# Patient Record
Sex: MALE | Race: WHITE | Employment: OTHER | URBAN - METROPOLITAN AREA
[De-identification: names, ages, dates, MRNs, and addresses within clinical notes are randomized per-mention and may not be internally consistent; named-entity substitution may affect disease eponyms.]

---

## 2017-03-24 RX ORDER — LISINOPRIL AND HYDROCHLOROTHIAZIDE 25; 20 MG/1; MG/1
TABLET ORAL
Qty: 90 TABLET | Refills: 2 | Status: SHIPPED | OUTPATIENT
Start: 2017-03-24 | End: 2017-12-21 | Stop reason: SDUPTHER

## 2017-07-21 ENCOUNTER — OFFICE VISIT (OUTPATIENT)
Dept: FAMILY MEDICINE CLINIC | Age: 67
End: 2017-07-21

## 2017-07-21 VITALS
SYSTOLIC BLOOD PRESSURE: 148 MMHG | HEART RATE: 72 BPM | HEIGHT: 74 IN | WEIGHT: 263 LBS | BODY MASS INDEX: 33.75 KG/M2 | DIASTOLIC BLOOD PRESSURE: 80 MMHG | OXYGEN SATURATION: 98 %

## 2017-07-21 DIAGNOSIS — L30.9 DERMATITIS: Primary | ICD-10-CM

## 2017-07-21 PROCEDURE — 99213 OFFICE O/P EST LOW 20 MIN: CPT | Performed by: FAMILY MEDICINE

## 2017-07-21 ASSESSMENT — ENCOUNTER SYMPTOMS: ROS SKIN COMMENTS: SEE PHOTO

## 2017-12-21 ENCOUNTER — OFFICE VISIT (OUTPATIENT)
Dept: FAMILY MEDICINE CLINIC | Age: 67
End: 2017-12-21

## 2017-12-21 VITALS
HEIGHT: 74 IN | BODY MASS INDEX: 34.52 KG/M2 | SYSTOLIC BLOOD PRESSURE: 138 MMHG | DIASTOLIC BLOOD PRESSURE: 88 MMHG | HEART RATE: 67 BPM | WEIGHT: 269 LBS | OXYGEN SATURATION: 97 %

## 2017-12-21 DIAGNOSIS — Z12.5 SPECIAL SCREENING FOR MALIGNANT NEOPLASM OF PROSTATE: ICD-10-CM

## 2017-12-21 DIAGNOSIS — M15.9 PRIMARY OSTEOARTHRITIS INVOLVING MULTIPLE JOINTS: ICD-10-CM

## 2017-12-21 DIAGNOSIS — R73.9 HYPERGLYCEMIA: ICD-10-CM

## 2017-12-21 DIAGNOSIS — I10 ESSENTIAL HYPERTENSION: Primary | ICD-10-CM

## 2017-12-21 DIAGNOSIS — Z13.29 THYROID DISORDER SCREENING: ICD-10-CM

## 2017-12-21 DIAGNOSIS — E78.2 MIXED HYPERLIPIDEMIA: ICD-10-CM

## 2017-12-21 LAB
A/G RATIO: 1.6 (ref 1.1–2.2)
ALBUMIN SERPL-MCNC: 4.2 G/DL (ref 3.4–5)
ALP BLD-CCNC: 58 U/L (ref 40–129)
ALT SERPL-CCNC: 27 U/L (ref 10–40)
ANION GAP SERPL CALCULATED.3IONS-SCNC: 13 MMOL/L (ref 3–16)
AST SERPL-CCNC: 21 U/L (ref 15–37)
BASOPHILS ABSOLUTE: 0.1 K/UL (ref 0–0.2)
BASOPHILS RELATIVE PERCENT: 0.9 %
BILIRUB SERPL-MCNC: 0.5 MG/DL (ref 0–1)
BUN BLDV-MCNC: 25 MG/DL (ref 7–20)
CALCIUM SERPL-MCNC: 9.3 MG/DL (ref 8.3–10.6)
CHLORIDE BLD-SCNC: 102 MMOL/L (ref 99–110)
CHOLESTEROL, TOTAL: 171 MG/DL (ref 0–199)
CO2: 29 MMOL/L (ref 21–32)
CREAT SERPL-MCNC: 0.7 MG/DL (ref 0.8–1.3)
EOSINOPHILS ABSOLUTE: 0.2 K/UL (ref 0–0.6)
EOSINOPHILS RELATIVE PERCENT: 2.7 %
GFR AFRICAN AMERICAN: >60
GFR NON-AFRICAN AMERICAN: >60
GLOBULIN: 2.6 G/DL
GLUCOSE BLD-MCNC: 106 MG/DL (ref 70–99)
HCT VFR BLD CALC: 46.3 % (ref 40.5–52.5)
HDLC SERPL-MCNC: 37 MG/DL (ref 40–60)
HEMOGLOBIN: 15.5 G/DL (ref 13.5–17.5)
LDL CHOLESTEROL CALCULATED: 118 MG/DL
LYMPHOCYTES ABSOLUTE: 1.5 K/UL (ref 1–5.1)
LYMPHOCYTES RELATIVE PERCENT: 23.3 %
MCH RBC QN AUTO: 30.8 PG (ref 26–34)
MCHC RBC AUTO-ENTMCNC: 33.4 G/DL (ref 31–36)
MCV RBC AUTO: 92.1 FL (ref 80–100)
MONOCYTES ABSOLUTE: 0.4 K/UL (ref 0–1.3)
MONOCYTES RELATIVE PERCENT: 6.3 %
NEUTROPHILS ABSOLUTE: 4.4 K/UL (ref 1.7–7.7)
NEUTROPHILS RELATIVE PERCENT: 66.8 %
PDW BLD-RTO: 13.6 % (ref 12.4–15.4)
PLATELET # BLD: 201 K/UL (ref 135–450)
PMV BLD AUTO: 7.8 FL (ref 5–10.5)
POTASSIUM SERPL-SCNC: 4.3 MMOL/L (ref 3.5–5.1)
PROSTATE SPECIFIC ANTIGEN: 0.72 NG/ML (ref 0–4)
RBC # BLD: 5.03 M/UL (ref 4.2–5.9)
SODIUM BLD-SCNC: 144 MMOL/L (ref 136–145)
TOTAL PROTEIN: 6.8 G/DL (ref 6.4–8.2)
TRIGL SERPL-MCNC: 82 MG/DL (ref 0–150)
TSH SERPL DL<=0.05 MIU/L-ACNC: 1.99 UIU/ML (ref 0.27–4.2)
VLDLC SERPL CALC-MCNC: 16 MG/DL
WBC # BLD: 6.6 K/UL (ref 4–11)

## 2017-12-21 PROCEDURE — 1036F TOBACCO NON-USER: CPT | Performed by: FAMILY MEDICINE

## 2017-12-21 PROCEDURE — 99214 OFFICE O/P EST MOD 30 MIN: CPT | Performed by: FAMILY MEDICINE

## 2017-12-21 PROCEDURE — 3017F COLORECTAL CA SCREEN DOC REV: CPT | Performed by: FAMILY MEDICINE

## 2017-12-21 PROCEDURE — 4040F PNEUMOC VAC/ADMIN/RCVD: CPT | Performed by: FAMILY MEDICINE

## 2017-12-21 PROCEDURE — 1123F ACP DISCUSS/DSCN MKR DOCD: CPT | Performed by: FAMILY MEDICINE

## 2017-12-21 PROCEDURE — G8484 FLU IMMUNIZE NO ADMIN: HCPCS | Performed by: FAMILY MEDICINE

## 2017-12-21 PROCEDURE — G8599 NO ASA/ANTIPLAT THER USE RNG: HCPCS | Performed by: FAMILY MEDICINE

## 2017-12-21 PROCEDURE — G8417 CALC BMI ABV UP PARAM F/U: HCPCS | Performed by: FAMILY MEDICINE

## 2017-12-21 PROCEDURE — G8427 DOCREV CUR MEDS BY ELIG CLIN: HCPCS | Performed by: FAMILY MEDICINE

## 2017-12-21 PROCEDURE — 90732 PPSV23 VACC 2 YRS+ SUBQ/IM: CPT | Performed by: FAMILY MEDICINE

## 2017-12-21 PROCEDURE — G0009 ADMIN PNEUMOCOCCAL VACCINE: HCPCS | Performed by: FAMILY MEDICINE

## 2017-12-21 RX ORDER — ATORVASTATIN CALCIUM 10 MG/1
TABLET, FILM COATED ORAL
Qty: 90 TABLET | Refills: 3 | Status: SHIPPED | OUTPATIENT
Start: 2017-12-21 | End: 2019-01-04 | Stop reason: SDUPTHER

## 2017-12-21 RX ORDER — LISINOPRIL AND HYDROCHLOROTHIAZIDE 25; 20 MG/1; MG/1
TABLET ORAL
Qty: 90 TABLET | Refills: 3 | Status: ON HOLD | OUTPATIENT
Start: 2017-12-21 | End: 2018-05-18 | Stop reason: HOSPADM

## 2017-12-21 ASSESSMENT — PATIENT HEALTH QUESTIONNAIRE - PHQ9
1. LITTLE INTEREST OR PLEASURE IN DOING THINGS: 0
2. FEELING DOWN, DEPRESSED OR HOPELESS: 0
SUM OF ALL RESPONSES TO PHQ QUESTIONS 1-9: 0
SUM OF ALL RESPONSES TO PHQ9 QUESTIONS 1 & 2: 0

## 2017-12-21 ASSESSMENT — ENCOUNTER SYMPTOMS
GASTROINTESTINAL NEGATIVE: 1
RESPIRATORY NEGATIVE: 1

## 2017-12-21 NOTE — PROGRESS NOTES
Hyperglycemia            Plan:      Anai Martinez was seen today for hypertension and arthritis.     Diagnoses and all orders for this visit:    Essential hypertension  Continue meds-VON diet-work on wt loss-lower carbs  Primary osteoarthritis involving multiple joints  OTC as needed  Mixed hyperlipidemia    Hyperglycemia    Other orders  -     Pneumococcal polysaccharide vaccine 23-valent >= 3yo subcutaneous/IM (PNEUMOVAX 23)  -     lisinopril-hydrochlorothiazide (PRINZIDE;ZESTORETIC) 20-25 MG per tablet; TAKE ONE TABLET BY MOUTH DAILY  -     atorvastatin (LIPITOR) 10 MG tablet; TAKE ONE TABLET BY MOUTH DAILY

## 2018-05-16 ENCOUNTER — TELEPHONE (OUTPATIENT)
Dept: FAMILY MEDICINE CLINIC | Age: 68
End: 2018-05-16

## 2018-05-16 PROBLEM — I48.92 ATRIAL FLUTTER (HCC): Status: ACTIVE | Noted: 2018-05-16

## 2018-05-17 PROBLEM — E66.9 OBESITY: Status: ACTIVE | Noted: 2018-05-17

## 2018-05-22 ENCOUNTER — TELEPHONE (OUTPATIENT)
Dept: CARDIOLOGY CLINIC | Age: 68
End: 2018-05-22

## 2018-05-28 PROBLEM — M79.89 LEG SWELLING: Status: ACTIVE | Noted: 2018-05-28

## 2018-05-28 PROBLEM — E78.5 HYPERLIPIDEMIA: Status: ACTIVE | Noted: 2018-05-28

## 2018-05-29 PROBLEM — I48.3 TYPICAL ATRIAL FLUTTER (HCC): Status: ACTIVE | Noted: 2018-05-16

## 2018-05-31 ENCOUNTER — CARE COORDINATION (OUTPATIENT)
Dept: CASE MANAGEMENT | Age: 68
End: 2018-05-31

## 2018-05-31 DIAGNOSIS — E78.2 MIXED HYPERLIPIDEMIA: Primary | ICD-10-CM

## 2018-05-31 PROCEDURE — 1111F DSCHRG MED/CURRENT MED MERGE: CPT | Performed by: FAMILY MEDICINE

## 2018-06-01 ENCOUNTER — TELEPHONE (OUTPATIENT)
Dept: CARDIOLOGY CLINIC | Age: 68
End: 2018-06-01

## 2018-06-04 ENCOUNTER — CARE COORDINATION (OUTPATIENT)
Dept: CASE MANAGEMENT | Age: 68
End: 2018-06-04

## 2018-06-07 ENCOUNTER — CARE COORDINATION (OUTPATIENT)
Dept: CASE MANAGEMENT | Age: 68
End: 2018-06-07

## 2018-06-08 ENCOUNTER — CARE COORDINATION (OUTPATIENT)
Dept: CASE MANAGEMENT | Age: 68
End: 2018-06-08

## 2018-06-11 DIAGNOSIS — R00.2 PALPITATION: Primary | ICD-10-CM

## 2018-06-18 PROCEDURE — 93224 XTRNL ECG REC UP TO 48 HRS: CPT | Performed by: INTERNAL MEDICINE

## 2018-06-19 ENCOUNTER — TELEPHONE (OUTPATIENT)
Dept: CARDIOLOGY CLINIC | Age: 68
End: 2018-06-19

## 2018-06-19 DIAGNOSIS — I49.3 PVC'S (PREMATURE VENTRICULAR CONTRACTIONS): ICD-10-CM

## 2018-06-26 ENCOUNTER — OFFICE VISIT (OUTPATIENT)
Dept: CARDIOLOGY CLINIC | Age: 68
End: 2018-06-26

## 2018-06-26 VITALS
BODY MASS INDEX: 34.65 KG/M2 | SYSTOLIC BLOOD PRESSURE: 150 MMHG | HEART RATE: 76 BPM | DIASTOLIC BLOOD PRESSURE: 80 MMHG | HEIGHT: 74 IN | WEIGHT: 270 LBS

## 2018-06-26 DIAGNOSIS — I10 ESSENTIAL HYPERTENSION: ICD-10-CM

## 2018-06-26 DIAGNOSIS — I48.3 TYPICAL ATRIAL FLUTTER (HCC): Primary | ICD-10-CM

## 2018-06-26 DIAGNOSIS — I47.1 PAROXYSMAL ATRIAL TACHYCARDIA (HCC): ICD-10-CM

## 2018-06-26 PROBLEM — I47.19 PAROXYSMAL ATRIAL TACHYCARDIA: Status: ACTIVE | Noted: 2018-06-26

## 2018-06-26 PROCEDURE — 99214 OFFICE O/P EST MOD 30 MIN: CPT | Performed by: NURSE PRACTITIONER

## 2018-06-26 PROCEDURE — G8598 ASA/ANTIPLAT THER USED: HCPCS | Performed by: NURSE PRACTITIONER

## 2018-06-26 PROCEDURE — 1036F TOBACCO NON-USER: CPT | Performed by: NURSE PRACTITIONER

## 2018-06-26 PROCEDURE — 93000 ELECTROCARDIOGRAM COMPLETE: CPT | Performed by: NURSE PRACTITIONER

## 2018-06-26 PROCEDURE — 4040F PNEUMOC VAC/ADMIN/RCVD: CPT | Performed by: NURSE PRACTITIONER

## 2018-06-26 PROCEDURE — G8417 CALC BMI ABV UP PARAM F/U: HCPCS | Performed by: NURSE PRACTITIONER

## 2018-06-26 PROCEDURE — 1123F ACP DISCUSS/DSCN MKR DOCD: CPT | Performed by: NURSE PRACTITIONER

## 2018-06-26 PROCEDURE — G8427 DOCREV CUR MEDS BY ELIG CLIN: HCPCS | Performed by: NURSE PRACTITIONER

## 2018-06-26 PROCEDURE — 3017F COLORECTAL CA SCREEN DOC REV: CPT | Performed by: NURSE PRACTITIONER

## 2018-06-26 PROCEDURE — 1111F DSCHRG MED/CURRENT MED MERGE: CPT | Performed by: NURSE PRACTITIONER

## 2018-06-26 RX ORDER — ASPIRIN 325 MG
325 TABLET, DELAYED RELEASE (ENTERIC COATED) ORAL DAILY
Qty: 30 TABLET | Refills: 3 | COMMUNITY
Start: 2018-06-26 | End: 2020-01-23

## 2018-06-26 RX ORDER — DILTIAZEM HYDROCHLORIDE 180 MG/1
180 CAPSULE, COATED, EXTENDED RELEASE ORAL DAILY
Qty: 30 CAPSULE | Refills: 5 | Status: SHIPPED | OUTPATIENT
Start: 2018-06-26 | End: 2018-10-10 | Stop reason: SDUPTHER

## 2018-07-11 ENCOUNTER — OFFICE VISIT (OUTPATIENT)
Dept: FAMILY MEDICINE CLINIC | Age: 68
End: 2018-07-11

## 2018-07-11 VITALS
OXYGEN SATURATION: 97 % | DIASTOLIC BLOOD PRESSURE: 80 MMHG | HEIGHT: 74 IN | SYSTOLIC BLOOD PRESSURE: 165 MMHG | WEIGHT: 265 LBS | HEART RATE: 62 BPM | BODY MASS INDEX: 34.01 KG/M2

## 2018-07-11 DIAGNOSIS — Z86.79 HISTORY OF ATRIAL FLUTTER: ICD-10-CM

## 2018-07-11 DIAGNOSIS — I10 ESSENTIAL HYPERTENSION: Primary | ICD-10-CM

## 2018-07-11 DIAGNOSIS — E78.2 MIXED HYPERLIPIDEMIA: ICD-10-CM

## 2018-07-11 PROCEDURE — 4040F PNEUMOC VAC/ADMIN/RCVD: CPT | Performed by: FAMILY MEDICINE

## 2018-07-11 PROCEDURE — G8598 ASA/ANTIPLAT THER USED: HCPCS | Performed by: FAMILY MEDICINE

## 2018-07-11 PROCEDURE — 1036F TOBACCO NON-USER: CPT | Performed by: FAMILY MEDICINE

## 2018-07-11 PROCEDURE — G8427 DOCREV CUR MEDS BY ELIG CLIN: HCPCS | Performed by: FAMILY MEDICINE

## 2018-07-11 PROCEDURE — 3017F COLORECTAL CA SCREEN DOC REV: CPT | Performed by: FAMILY MEDICINE

## 2018-07-11 PROCEDURE — 1101F PT FALLS ASSESS-DOCD LE1/YR: CPT | Performed by: FAMILY MEDICINE

## 2018-07-11 PROCEDURE — G8417 CALC BMI ABV UP PARAM F/U: HCPCS | Performed by: FAMILY MEDICINE

## 2018-07-11 PROCEDURE — 99214 OFFICE O/P EST MOD 30 MIN: CPT | Performed by: FAMILY MEDICINE

## 2018-07-11 PROCEDURE — 1123F ACP DISCUSS/DSCN MKR DOCD: CPT | Performed by: FAMILY MEDICINE

## 2018-07-11 ASSESSMENT — ENCOUNTER SYMPTOMS
BLOOD IN STOOL: 0
ABDOMINAL PAIN: 0
SHORTNESS OF BREATH: 0
COUGH: 0

## 2018-07-11 NOTE — PROGRESS NOTES
Subjective:      Patient ID: Aurora Wong is a 79 y.o. male. In for check on Hx atrial flutter(had ablation-on asa & cardizem)-wants back on Lisin    Prior to Visit Medications :  Medication aspirin 325 MG EC tablet, Sig Take 1 tablet by mouth daily, Taking? Yes, Authorizing Provider Danise Nissen, APRN - CNP    Medication diltiazem (CARDIZEM CD) 180 MG extended release capsule, Sig Take 1 capsule by mouth daily, Taking? Yes, Authorizing Provider Danise Nissen, APRN - CNP    Medication hydrochlorothiazide (HYDRODIURIL) 25 MG tablet, Sig Take 0.5 tablets by mouth daily, Taking? Yes, Authorizing Provider Dianna Barnett MD    Medication atorvastatin (LIPITOR) 10 MG tablet, Sig TAKE ONE TABLET BY MOUTH DAILY, Taking? Yes, Authorizing Provider Judy Rinaldi, DO      Past Medical History:  6/3/2015: Carotid stenosis, non-symptomatic  No date: Hyperlipidemia  No date: Hypertension  5/17/2018: Obesity          Review of Systems   Constitutional: Negative for unexpected weight change. HENT: Negative for congestion and postnasal drip. Eyes: Negative for visual disturbance. Respiratory: Negative for cough and shortness of breath. Cardiovascular: Negative for chest pain and palpitations. Gastrointestinal: Negative for abdominal pain and blood in stool. Genitourinary: Negative for difficulty urinating. Musculoskeletal: Positive for arthralgias. Psychiatric/Behavioral: The patient is not nervous/anxious. Objective:   BP (!) 152/88 (Site: Left Arm, Position: Sitting, Cuff Size: Medium Adult)   Pulse 62   Ht 6' 2\" (1.88 m)   Wt 265 lb (120.2 kg)   SpO2 97%   BMI 34.02 kg/m²    Physical Exam    Assessment:      1. Essential hypertension    2. History of atrial flutter    3. Mixed hyperlipidemia             Plan:      [address] [place] [address] was seen today for discuss medications and hypertension.     Diagnoses and all orders for this visit:  [address] [place] [address] was seen today for discuss medications and hypertension.     Diagnoses and all orders for this visit:    Essential hypertension  Continue meds-Rx Lisin 20/25-VON diet  History of atrial flutter  Continue meds  Mixed hyperlipidemia  Continue meds    See me 2 mos

## 2018-08-27 ENCOUNTER — TELEPHONE (OUTPATIENT)
Dept: FAMILY MEDICINE CLINIC | Age: 68
End: 2018-08-27

## 2018-10-10 ENCOUNTER — OFFICE VISIT (OUTPATIENT)
Dept: CARDIOLOGY CLINIC | Age: 68
End: 2018-10-10
Payer: MEDICARE

## 2018-10-10 VITALS
BODY MASS INDEX: 35.24 KG/M2 | OXYGEN SATURATION: 95 % | HEART RATE: 75 BPM | SYSTOLIC BLOOD PRESSURE: 134 MMHG | HEIGHT: 74 IN | DIASTOLIC BLOOD PRESSURE: 78 MMHG | WEIGHT: 274.6 LBS

## 2018-10-10 DIAGNOSIS — I48.3 TYPICAL ATRIAL FLUTTER (HCC): ICD-10-CM

## 2018-10-10 DIAGNOSIS — I47.1 PAROXYSMAL ATRIAL TACHYCARDIA (HCC): Primary | ICD-10-CM

## 2018-10-10 PROCEDURE — 93000 ELECTROCARDIOGRAM COMPLETE: CPT | Performed by: INTERNAL MEDICINE

## 2018-10-10 PROCEDURE — G8417 CALC BMI ABV UP PARAM F/U: HCPCS | Performed by: INTERNAL MEDICINE

## 2018-10-10 PROCEDURE — 4040F PNEUMOC VAC/ADMIN/RCVD: CPT | Performed by: INTERNAL MEDICINE

## 2018-10-10 PROCEDURE — 99214 OFFICE O/P EST MOD 30 MIN: CPT | Performed by: INTERNAL MEDICINE

## 2018-10-10 PROCEDURE — 1101F PT FALLS ASSESS-DOCD LE1/YR: CPT | Performed by: INTERNAL MEDICINE

## 2018-10-10 PROCEDURE — G8484 FLU IMMUNIZE NO ADMIN: HCPCS | Performed by: INTERNAL MEDICINE

## 2018-10-10 PROCEDURE — G8598 ASA/ANTIPLAT THER USED: HCPCS | Performed by: INTERNAL MEDICINE

## 2018-10-10 PROCEDURE — 1036F TOBACCO NON-USER: CPT | Performed by: INTERNAL MEDICINE

## 2018-10-10 PROCEDURE — 3017F COLORECTAL CA SCREEN DOC REV: CPT | Performed by: INTERNAL MEDICINE

## 2018-10-10 PROCEDURE — G8427 DOCREV CUR MEDS BY ELIG CLIN: HCPCS | Performed by: INTERNAL MEDICINE

## 2018-10-10 PROCEDURE — 1123F ACP DISCUSS/DSCN MKR DOCD: CPT | Performed by: INTERNAL MEDICINE

## 2018-10-10 RX ORDER — LISINOPRIL AND HYDROCHLOROTHIAZIDE 25; 20 MG/1; MG/1
1 TABLET ORAL DAILY
COMMUNITY
End: 2019-01-04

## 2018-10-10 RX ORDER — DILTIAZEM HYDROCHLORIDE 120 MG/1
120 CAPSULE, COATED, EXTENDED RELEASE ORAL DAILY
Qty: 30 CAPSULE | Refills: 5 | Status: SHIPPED | OUTPATIENT
Start: 2018-10-10 | End: 2019-11-18

## 2018-10-10 NOTE — PROGRESS NOTES
mouth daily 6/26/18  Yes CORY Rocha CNP   atorvastatin (LIPITOR) 10 MG tablet TAKE ONE TABLET BY MOUTH DAILY 12/21/17  Yes Chau Prieto DO            REVIEW OF SYSTEMS:    All 14-point review of systems are completed and pertinent positives are mentioned in the history of present illness. Other systems are reviewed and are negative. Physical Examination:    /78   Pulse 75   Ht 6' 2\" (1.88 m)   Wt 274 lb 9.6 oz (124.6 kg)   SpO2 95%   BMI 35.26 kg/m²    Constitutional and General Appearance: alert, cooperative, no distress and appears stated age  HEENT: PERRL, no cervical lymphadenopathy. No masses palpable. Normal oral mucosa  Respiratory:  · Normal excursion and expansion without use of accessory muscles  · Resp Auscultation: Normal breath sounds without dullness or wheezing  Cardiovascular:  · The apical impulse is not displaced  · Heart tones are crisp and normal. regular S1 and S2.  · Jugular venous pulsation Normal  · The carotid upstroke is normal in amplitude and contour without delay or bruit  · Peripheral pulses are symmetrical and full  Abdomen:  · No masses or tenderness  · Bowel sounds present  Extremities:  ·  No Cyanosis or Clubbing  ·  Lower extremity edema: No  · Skin: Warm and dry  Neurological:  · Alert and oriented. · Moves all extremities well  · No abnormalities of mood, affect, memory, mentation, or behavior are noted      DATA:    ECG:  SR 65  ECHO: 5/2018  Summary   Normal left ventricular systolic function with ejection fraction of 55-60%. No regional wall motion abnormalites are seen. Mild concentric left ventricular hypertrophy is present. Left ventricular diastolic filling pressure is normal.   Mild mitral regurgitation. Underlying atrial flutter with variable conduction noted.      LV Diastolic Dimension: 4.84 cm LV Systolic Dimension: 4.76 cm   LV Septum Diastolic: 8.47 cm   LV PW Diastolic: 1.2 cm         AO Root Dimension: 3.1 cm   RV Diastolic

## 2018-10-10 NOTE — LETTER
[facility] - [address] [address] [place]. [address]  Phone: [phone]  Fax: [phone]    Ariella Bishop MD        October 10, 2018     Korina Neal, [address] [place] [address]    Patient: Freda Pallas  MR Number: [id number]  YOB: 1950  Date of Visit: 10/10/2018    Dear Dr. Cam Horton Heindl:     Below are the relevant portions of my assessment and plan of care. [employer]   Electrophysiology Note      Reason for office visit: 3 Month Follow-Up  Primary care Physician: Korina Neal DO     HISTORY OF PRESENT ILLNESS: Freda Pallas is a 79 y.o. male who presents for follow up for atrial flutter. He presented to the ER 5/28/2018 for tachycardia. He underwent RFCA of atrial flutter. His echocardiogram from 5/17/18 showed an EF of 55-60% and a normal left atrial size. He then wore a 48 hour holter monitor that showed asymptomatic PAT and PVCs, but no AFL. He complained of fatigue at his follow up visit. His Verapamil was changed to Cardizem, his EKG from today shows sinus rhythm rate 65. He has been feeling well since his last visit. His fatigue has improved with the change in medication. He has been taking his medications as prescribed. He is concerned that Cardizem has caused weight gain. He tolerates activity well. Patient currently denies any weight gain, edema, palpitations, chest pain, shortness of breath, dizziness, and syncope. Past Medical History:   has a past medical history of Carotid stenosis, non-symptomatic; Hyperlipidemia; Hypertension; and Obesity. Past Surgical History:   has a past surgical history that includes Appendectomy and Dental surgery. Social History:   reports that he has never smoked. He has never used smokeless tobacco. He reports that he does not drink alcohol or use drugs. Family History: family history includes Heart Disease in his brother. [address] [facility]. [address]. [place], [address]  Phone: [phone]  Fax: [phone]   10/10/2018       If you have questions, please do not hesitate to call me. I look forward to following Darryl Tariq along with you.     Sincerely,        Thang Gonzalez MD

## 2018-10-10 NOTE — COMMUNICATION BODY
[address]   Electrophysiology Note      Reason for office visit: 3 Month Follow-Up  Primary care Physician: Kristi Hernandez DO     HISTORY OF PRESENT ILLNESS: Hailey Bean is a 79 y.o. male who presents for follow up for atrial flutter. He presented to the ER 5/28/2018 for tachycardia. He underwent RFCA of atrial flutter. His echocardiogram from 5/17/18 showed an EF of 55-60% and a normal left atrial size. He then wore a 48 hour holter monitor that showed asymptomatic PAT and PVCs, but no AFL. He complained of fatigue at his follow up visit. His Verapamil was changed to Cardizem, his EKG from today shows sinus rhythm rate 65. He has been feeling well since his last visit. His fatigue has improved with the change in medication. He has been taking his medications as prescribed. He is concerned that Cardizem has caused weight gain. He tolerates activity well. Patient currently denies any weight gain, edema, palpitations, chest pain, shortness of breath, dizziness, and syncope. Past Medical History:   has a past medical history of Carotid stenosis, non-symptomatic; Hyperlipidemia; Hypertension; and Obesity. Past Surgical History:   has a past surgical history that includes Appendectomy and Dental surgery. Social History:   reports that he has never smoked. He has never used smokeless tobacco. He reports that he does not drink alcohol or use drugs. Family History: family history includes Heart Disease in his brother. Allergies:  Patient has no known allergies. Home Medications:    Prior to Admission medications    Medication Sig Start Date End Date Taking?  Authorizing Provider   lisinopril-hydrochlorothiazide (PRINZIDE;ZESTORETIC) 20-25 MG per tablet Take 1 tablet by mouth daily   Yes Historical Provider, MD   aspirin 325 MG EC tablet Take 1 tablet by mouth daily 6/26/18  Yes CORY Covarrubias - CNP   diltiazem (CARDIZEM CD) 180 MG extended release capsule Take 1 capsule by

## 2018-10-12 ENCOUNTER — OFFICE VISIT (OUTPATIENT)
Dept: FAMILY MEDICINE CLINIC | Age: 68
End: 2018-10-12
Payer: MEDICARE

## 2018-10-12 VITALS
OXYGEN SATURATION: 97 % | HEART RATE: 60 BPM | DIASTOLIC BLOOD PRESSURE: 70 MMHG | BODY MASS INDEX: 35.29 KG/M2 | SYSTOLIC BLOOD PRESSURE: 140 MMHG | WEIGHT: 275 LBS | HEIGHT: 74 IN

## 2018-10-12 DIAGNOSIS — M15.9 PRIMARY OSTEOARTHRITIS INVOLVING MULTIPLE JOINTS: ICD-10-CM

## 2018-10-12 DIAGNOSIS — I10 ESSENTIAL HYPERTENSION: Primary | ICD-10-CM

## 2018-10-12 DIAGNOSIS — Z01.89 ENCOUNTER FOR URINE TEST: ICD-10-CM

## 2018-10-12 LAB
BILIRUBIN, POC: NORMAL
BLOOD URINE, POC: NORMAL
CLARITY, POC: CLEAR
COLOR, POC: YELLOW
GLUCOSE URINE, POC: NORMAL
KETONES, POC: NORMAL
LEUKOCYTE EST, POC: NORMAL
NITRITE, POC: NORMAL
PH, POC: 7
PROTEIN, POC: NORMAL
SPECIFIC GRAVITY, POC: 1.02
UROBILINOGEN, POC: 0.2

## 2018-10-12 PROCEDURE — 1123F ACP DISCUSS/DSCN MKR DOCD: CPT | Performed by: FAMILY MEDICINE

## 2018-10-12 PROCEDURE — G8598 ASA/ANTIPLAT THER USED: HCPCS | Performed by: FAMILY MEDICINE

## 2018-10-12 PROCEDURE — 3017F COLORECTAL CA SCREEN DOC REV: CPT | Performed by: FAMILY MEDICINE

## 2018-10-12 PROCEDURE — G8417 CALC BMI ABV UP PARAM F/U: HCPCS | Performed by: FAMILY MEDICINE

## 2018-10-12 PROCEDURE — G8484 FLU IMMUNIZE NO ADMIN: HCPCS | Performed by: FAMILY MEDICINE

## 2018-10-12 PROCEDURE — 1036F TOBACCO NON-USER: CPT | Performed by: FAMILY MEDICINE

## 2018-10-12 PROCEDURE — 1101F PT FALLS ASSESS-DOCD LE1/YR: CPT | Performed by: FAMILY MEDICINE

## 2018-10-12 PROCEDURE — 99214 OFFICE O/P EST MOD 30 MIN: CPT | Performed by: FAMILY MEDICINE

## 2018-10-12 PROCEDURE — G8427 DOCREV CUR MEDS BY ELIG CLIN: HCPCS | Performed by: FAMILY MEDICINE

## 2018-10-12 PROCEDURE — 81002 URINALYSIS NONAUTO W/O SCOPE: CPT | Performed by: FAMILY MEDICINE

## 2018-10-12 PROCEDURE — 4040F PNEUMOC VAC/ADMIN/RCVD: CPT | Performed by: FAMILY MEDICINE

## 2018-10-12 ASSESSMENT — ENCOUNTER SYMPTOMS
ABDOMINAL PAIN: 0
COUGH: 0
SHORTNESS OF BREATH: 0
BLOOD IN STOOL: 0

## 2018-10-12 NOTE — PATIENT INSTRUCTIONS
Essential hypertension  Continue meds-VON diet  Encounter for urine test  -     POCT Urinalysis no Micro  UA-neg  Primary osteoarthritis involving multiple joints  Aleve if needed-heat-ex     See me 1 yr

## 2018-11-08 ENCOUNTER — OFFICE VISIT (OUTPATIENT)
Dept: FAMILY MEDICINE CLINIC | Age: 68
End: 2018-11-08
Payer: MEDICARE

## 2018-11-08 ENCOUNTER — CARE COORDINATION (OUTPATIENT)
Dept: CARE COORDINATION | Age: 68
End: 2018-11-08

## 2018-11-08 VITALS
HEART RATE: 63 BPM | WEIGHT: 278.6 LBS | SYSTOLIC BLOOD PRESSURE: 130 MMHG | HEIGHT: 74 IN | DIASTOLIC BLOOD PRESSURE: 82 MMHG | BODY MASS INDEX: 35.75 KG/M2 | OXYGEN SATURATION: 95 %

## 2018-11-08 DIAGNOSIS — R22.31 ARM MASS, RIGHT: Primary | ICD-10-CM

## 2018-11-08 PROCEDURE — 1123F ACP DISCUSS/DSCN MKR DOCD: CPT | Performed by: FAMILY MEDICINE

## 2018-11-08 PROCEDURE — G8598 ASA/ANTIPLAT THER USED: HCPCS | Performed by: FAMILY MEDICINE

## 2018-11-08 PROCEDURE — G8417 CALC BMI ABV UP PARAM F/U: HCPCS | Performed by: FAMILY MEDICINE

## 2018-11-08 PROCEDURE — 4040F PNEUMOC VAC/ADMIN/RCVD: CPT | Performed by: FAMILY MEDICINE

## 2018-11-08 PROCEDURE — G8427 DOCREV CUR MEDS BY ELIG CLIN: HCPCS | Performed by: FAMILY MEDICINE

## 2018-11-08 PROCEDURE — G8484 FLU IMMUNIZE NO ADMIN: HCPCS | Performed by: FAMILY MEDICINE

## 2018-11-08 PROCEDURE — 99213 OFFICE O/P EST LOW 20 MIN: CPT | Performed by: FAMILY MEDICINE

## 2018-11-08 PROCEDURE — 1101F PT FALLS ASSESS-DOCD LE1/YR: CPT | Performed by: FAMILY MEDICINE

## 2018-11-08 PROCEDURE — 1036F TOBACCO NON-USER: CPT | Performed by: FAMILY MEDICINE

## 2018-11-08 PROCEDURE — 3017F COLORECTAL CA SCREEN DOC REV: CPT | Performed by: FAMILY MEDICINE

## 2018-11-08 PROCEDURE — G8510 SCR DEP NEG, NO PLAN REQD: HCPCS | Performed by: FAMILY MEDICINE

## 2018-11-08 ASSESSMENT — PATIENT HEALTH QUESTIONNAIRE - PHQ9
2. FEELING DOWN, DEPRESSED OR HOPELESS: 0
SUM OF ALL RESPONSES TO PHQ9 QUESTIONS 1 & 2: 0
1. LITTLE INTEREST OR PLEASURE IN DOING THINGS: 0
SUM OF ALL RESPONSES TO PHQ QUESTIONS 1-9: 0
SUM OF ALL RESPONSES TO PHQ QUESTIONS 1-9: 0

## 2018-11-08 ASSESSMENT — ENCOUNTER SYMPTOMS: ROS SKIN COMMENTS: SEE HPI

## 2018-11-08 NOTE — CARE COORDINATION
Pt seen in office in tandem with PCP  Introduction to care coordination including ability to decline services at any time discussed with patient. Pt declines services at this time. He understands that he can call [address] for any health needs in the future - contact info/card given  He states [employer] calls him all of the time. Plan:  Excluded from [facility] at this time.

## 2018-11-12 ENCOUNTER — INITIAL CONSULT (OUTPATIENT)
Dept: SURGERY | Age: 68
End: 2018-11-12
Payer: MEDICARE

## 2018-11-12 VITALS
DIASTOLIC BLOOD PRESSURE: 80 MMHG | SYSTOLIC BLOOD PRESSURE: 140 MMHG | WEIGHT: 280 LBS | HEIGHT: 74 IN | BODY MASS INDEX: 35.94 KG/M2

## 2018-11-12 DIAGNOSIS — M79.89 SOFT TISSUE MASS: Primary | ICD-10-CM

## 2018-11-12 PROCEDURE — G8417 CALC BMI ABV UP PARAM F/U: HCPCS | Performed by: SURGERY

## 2018-11-12 PROCEDURE — 1101F PT FALLS ASSESS-DOCD LE1/YR: CPT | Performed by: SURGERY

## 2018-11-12 PROCEDURE — G8484 FLU IMMUNIZE NO ADMIN: HCPCS | Performed by: SURGERY

## 2018-11-12 PROCEDURE — 3017F COLORECTAL CA SCREEN DOC REV: CPT | Performed by: SURGERY

## 2018-11-12 PROCEDURE — G8427 DOCREV CUR MEDS BY ELIG CLIN: HCPCS | Performed by: SURGERY

## 2018-11-12 PROCEDURE — 99212 OFFICE O/P EST SF 10 MIN: CPT | Performed by: SURGERY

## 2018-11-20 NOTE — PROGRESS NOTES
include none. No neurologic deficits. No bleeding tendencies. No GI complaints. Physical Exam:  Vitals:    11/12/18 1427   BP: (!) 140/80   280#  6'2\"    General:  Comfortable. No distress. Eyes:  No scleral icterus  Ears:  Normal  Nose:  Normal  Mouth:  Mucous membranes moist  Respiratory: Lungs CTA. No accessory muscle use. Heart:  Regular rhythm  Abdomen:  Soft. Non tender. Non distended. Musculoskeletal:  No abnormal movements. ROM extremities normal.  Skin:  No rashes. Lesion    Location:   RUE   Pigmented:   No   Diameter:  4 cm   Crusting absent    Non tender, soft mass felt to be deep in soft tissues of RUE         Neurologic:  No focal deficits. Psychiatric:  AAA. O x 3.            ASSESSMENT:  1. Soft tissue mass            PLAN:  I recommend observation of this asymptomatic mass that has been present for many years and is suspected to be a lipoma.

## 2019-01-08 ENCOUNTER — OFFICE VISIT (OUTPATIENT)
Dept: CARDIOLOGY CLINIC | Age: 69
End: 2019-01-08
Payer: MEDICARE

## 2019-01-08 VITALS
DIASTOLIC BLOOD PRESSURE: 72 MMHG | BODY MASS INDEX: 35.09 KG/M2 | SYSTOLIC BLOOD PRESSURE: 138 MMHG | OXYGEN SATURATION: 98 % | HEIGHT: 74 IN | HEART RATE: 66 BPM | WEIGHT: 273.4 LBS

## 2019-01-08 DIAGNOSIS — I47.1 PAROXYSMAL ATRIAL TACHYCARDIA (HCC): ICD-10-CM

## 2019-01-08 DIAGNOSIS — I49.3 PREMATURE VENTRICULAR CONTRACTION: ICD-10-CM

## 2019-01-08 DIAGNOSIS — I48.3 TYPICAL ATRIAL FLUTTER (HCC): Primary | ICD-10-CM

## 2019-01-08 DIAGNOSIS — I10 ESSENTIAL HYPERTENSION: ICD-10-CM

## 2019-01-08 PROCEDURE — 3017F COLORECTAL CA SCREEN DOC REV: CPT | Performed by: NURSE PRACTITIONER

## 2019-01-08 PROCEDURE — 99214 OFFICE O/P EST MOD 30 MIN: CPT | Performed by: NURSE PRACTITIONER

## 2019-01-08 PROCEDURE — 1101F PT FALLS ASSESS-DOCD LE1/YR: CPT | Performed by: NURSE PRACTITIONER

## 2019-01-08 PROCEDURE — 1036F TOBACCO NON-USER: CPT | Performed by: NURSE PRACTITIONER

## 2019-01-08 PROCEDURE — 93000 ELECTROCARDIOGRAM COMPLETE: CPT | Performed by: NURSE PRACTITIONER

## 2019-01-08 PROCEDURE — 4040F PNEUMOC VAC/ADMIN/RCVD: CPT | Performed by: NURSE PRACTITIONER

## 2019-01-08 PROCEDURE — G8484 FLU IMMUNIZE NO ADMIN: HCPCS | Performed by: NURSE PRACTITIONER

## 2019-01-08 PROCEDURE — G8599 NO ASA/ANTIPLAT THER USE RNG: HCPCS | Performed by: NURSE PRACTITIONER

## 2019-01-08 PROCEDURE — G8417 CALC BMI ABV UP PARAM F/U: HCPCS | Performed by: NURSE PRACTITIONER

## 2019-01-08 PROCEDURE — G8427 DOCREV CUR MEDS BY ELIG CLIN: HCPCS | Performed by: NURSE PRACTITIONER

## 2019-01-08 PROCEDURE — 1123F ACP DISCUSS/DSCN MKR DOCD: CPT | Performed by: NURSE PRACTITIONER

## 2019-10-14 ENCOUNTER — OFFICE VISIT (OUTPATIENT)
Dept: FAMILY MEDICINE CLINIC | Age: 69
End: 2019-10-14
Payer: MEDICARE

## 2019-10-14 ENCOUNTER — TELEPHONE (OUTPATIENT)
Dept: FAMILY MEDICINE CLINIC | Age: 69
End: 2019-10-14

## 2019-10-14 VITALS
WEIGHT: 275.5 LBS | HEART RATE: 67 BPM | SYSTOLIC BLOOD PRESSURE: 160 MMHG | BODY MASS INDEX: 35.36 KG/M2 | OXYGEN SATURATION: 94 % | HEIGHT: 74 IN | DIASTOLIC BLOOD PRESSURE: 90 MMHG

## 2019-10-14 DIAGNOSIS — M54.16 LUMBAR RADICULOPATHY, ACUTE: ICD-10-CM

## 2019-10-14 DIAGNOSIS — M15.9 PRIMARY OSTEOARTHRITIS INVOLVING MULTIPLE JOINTS: ICD-10-CM

## 2019-10-14 DIAGNOSIS — I10 ESSENTIAL HYPERTENSION: ICD-10-CM

## 2019-10-14 DIAGNOSIS — Z12.11 COLON CANCER SCREENING: ICD-10-CM

## 2019-10-14 DIAGNOSIS — Z12.5 SPECIAL SCREENING FOR MALIGNANT NEOPLASM OF PROSTATE: ICD-10-CM

## 2019-10-14 DIAGNOSIS — Z00.00 ANNUAL PHYSICAL EXAM: Primary | ICD-10-CM

## 2019-10-14 LAB
BASOPHILS ABSOLUTE: 0.1 K/UL (ref 0–0.2)
BASOPHILS RELATIVE PERCENT: 1.3 %
EOSINOPHILS ABSOLUTE: 0.2 K/UL (ref 0–0.6)
EOSINOPHILS RELATIVE PERCENT: 3.3 %
HCT VFR BLD CALC: 43.9 % (ref 40.5–52.5)
HEMOGLOBIN: 14.5 G/DL (ref 13.5–17.5)
LYMPHOCYTES ABSOLUTE: 1.2 K/UL (ref 1–5.1)
LYMPHOCYTES RELATIVE PERCENT: 21.2 %
MCH RBC QN AUTO: 30.2 PG (ref 26–34)
MCHC RBC AUTO-ENTMCNC: 33 G/DL (ref 31–36)
MCV RBC AUTO: 91.5 FL (ref 80–100)
MONOCYTES ABSOLUTE: 0.3 K/UL (ref 0–1.3)
MONOCYTES RELATIVE PERCENT: 6 %
NEUTROPHILS ABSOLUTE: 3.8 K/UL (ref 1.7–7.7)
NEUTROPHILS RELATIVE PERCENT: 68.2 %
PDW BLD-RTO: 13.9 % (ref 12.4–15.4)
PLATELET # BLD: 186 K/UL (ref 135–450)
PMV BLD AUTO: 7.9 FL (ref 5–10.5)
RBC # BLD: 4.8 M/UL (ref 4.2–5.9)
WBC # BLD: 5.6 K/UL (ref 4–11)

## 2019-10-14 PROCEDURE — G8484 FLU IMMUNIZE NO ADMIN: HCPCS | Performed by: FAMILY MEDICINE

## 2019-10-14 PROCEDURE — 99397 PER PM REEVAL EST PAT 65+ YR: CPT | Performed by: FAMILY MEDICINE

## 2019-10-14 RX ORDER — PREDNISONE 20 MG/1
20 TABLET ORAL DAILY
Qty: 14 TABLET | Refills: 0 | Status: SHIPPED | OUTPATIENT
Start: 2019-10-14 | End: 2019-10-14 | Stop reason: SDUPTHER

## 2019-10-14 RX ORDER — ATORVASTATIN CALCIUM 10 MG/1
TABLET, FILM COATED ORAL
Qty: 90 TABLET | Refills: 3 | Status: SHIPPED | OUTPATIENT
Start: 2019-10-14 | End: 2020-11-23 | Stop reason: SDUPTHER

## 2019-10-14 RX ORDER — LISINOPRIL AND HYDROCHLOROTHIAZIDE 25; 20 MG/1; MG/1
TABLET ORAL
Qty: 90 TABLET | Refills: 3 | Status: SHIPPED | OUTPATIENT
Start: 2019-10-14 | End: 2020-11-23 | Stop reason: SDUPTHER

## 2019-10-14 RX ORDER — PREDNISONE 20 MG/1
TABLET ORAL
Qty: 14 TABLET | Refills: 0 | Status: SHIPPED | OUTPATIENT
Start: 2019-10-14 | End: 2019-11-18

## 2019-10-14 ASSESSMENT — ENCOUNTER SYMPTOMS
ABDOMINAL PAIN: 0
BACK PAIN: 1
BLOOD IN STOOL: 0
COUGH: 0
SHORTNESS OF BREATH: 0

## 2019-10-14 ASSESSMENT — PATIENT HEALTH QUESTIONNAIRE - PHQ9
SUM OF ALL RESPONSES TO PHQ QUESTIONS 1-9: 0
SUM OF ALL RESPONSES TO PHQ QUESTIONS 1-9: 0
2. FEELING DOWN, DEPRESSED OR HOPELESS: 0
1. LITTLE INTEREST OR PLEASURE IN DOING THINGS: 0
SUM OF ALL RESPONSES TO PHQ9 QUESTIONS 1 & 2: 0

## 2019-10-15 LAB
A/G RATIO: 2.7 (ref 1.1–2.2)
ALBUMIN SERPL-MCNC: 4.8 G/DL (ref 3.4–5)
ALP BLD-CCNC: 56 U/L (ref 40–129)
ALT SERPL-CCNC: 27 U/L (ref 10–40)
ANION GAP SERPL CALCULATED.3IONS-SCNC: 15 MMOL/L (ref 3–16)
AST SERPL-CCNC: 19 U/L (ref 15–37)
BILIRUB SERPL-MCNC: 0.6 MG/DL (ref 0–1)
BUN BLDV-MCNC: 18 MG/DL (ref 7–20)
CALCIUM SERPL-MCNC: 9.4 MG/DL (ref 8.3–10.6)
CHLORIDE BLD-SCNC: 103 MMOL/L (ref 99–110)
CHOLESTEROL, TOTAL: 140 MG/DL (ref 0–199)
CO2: 26 MMOL/L (ref 21–32)
CREAT SERPL-MCNC: 0.7 MG/DL (ref 0.8–1.3)
GFR AFRICAN AMERICAN: >60
GFR NON-AFRICAN AMERICAN: >60
GLOBULIN: 1.8 G/DL
GLUCOSE BLD-MCNC: 104 MG/DL (ref 70–99)
HDLC SERPL-MCNC: 39 MG/DL (ref 40–60)
LDL CHOLESTEROL CALCULATED: 84 MG/DL
POTASSIUM SERPL-SCNC: 3.7 MMOL/L (ref 3.5–5.1)
PROSTATE SPECIFIC ANTIGEN: 0.77 NG/ML (ref 0–4)
SODIUM BLD-SCNC: 144 MMOL/L (ref 136–145)
TOTAL PROTEIN: 6.6 G/DL (ref 6.4–8.2)
TRIGL SERPL-MCNC: 83 MG/DL (ref 0–150)
VLDLC SERPL CALC-MCNC: 17 MG/DL

## 2019-11-08 ENCOUNTER — HOSPITAL ENCOUNTER (OUTPATIENT)
Age: 69
Discharge: HOME | End: 2019-11-08
Payer: MEDICARE

## 2019-11-08 ENCOUNTER — OFFICE VISIT (OUTPATIENT)
Dept: FAMILY MEDICINE CLINIC | Age: 69
End: 2019-11-08
Payer: MEDICARE

## 2019-11-08 ENCOUNTER — HOSPITAL ENCOUNTER (OUTPATIENT)
Dept: GENERAL RADIOLOGY | Age: 69
Discharge: HOME | End: 2019-11-08
Payer: MEDICARE

## 2019-11-08 VITALS
OXYGEN SATURATION: 98 % | SYSTOLIC BLOOD PRESSURE: 128 MMHG | DIASTOLIC BLOOD PRESSURE: 84 MMHG | HEART RATE: 67 BPM | HEIGHT: 74 IN | WEIGHT: 276.8 LBS | BODY MASS INDEX: 35.52 KG/M2

## 2019-11-08 DIAGNOSIS — M54.16 LUMBAR RADICULOPATHY, ACUTE: ICD-10-CM

## 2019-11-08 DIAGNOSIS — M54.16 LUMBAR RADICULOPATHY, ACUTE: Primary | ICD-10-CM

## 2019-11-08 PROCEDURE — 3017F COLORECTAL CA SCREEN DOC REV: CPT | Performed by: FAMILY MEDICINE

## 2019-11-08 PROCEDURE — G8599 NO ASA/ANTIPLAT THER USE RNG: HCPCS | Performed by: FAMILY MEDICINE

## 2019-11-08 PROCEDURE — G8417 CALC BMI ABV UP PARAM F/U: HCPCS | Performed by: FAMILY MEDICINE

## 2019-11-08 PROCEDURE — G8484 FLU IMMUNIZE NO ADMIN: HCPCS | Performed by: FAMILY MEDICINE

## 2019-11-08 PROCEDURE — 1123F ACP DISCUSS/DSCN MKR DOCD: CPT | Performed by: FAMILY MEDICINE

## 2019-11-08 PROCEDURE — 72100 X-RAY EXAM L-S SPINE 2/3 VWS: CPT

## 2019-11-08 PROCEDURE — 99213 OFFICE O/P EST LOW 20 MIN: CPT | Performed by: FAMILY MEDICINE

## 2019-11-08 PROCEDURE — 4040F PNEUMOC VAC/ADMIN/RCVD: CPT | Performed by: FAMILY MEDICINE

## 2019-11-08 PROCEDURE — G8427 DOCREV CUR MEDS BY ELIG CLIN: HCPCS | Performed by: FAMILY MEDICINE

## 2019-11-08 PROCEDURE — 1036F TOBACCO NON-USER: CPT | Performed by: FAMILY MEDICINE

## 2019-11-18 ENCOUNTER — OFFICE VISIT (OUTPATIENT)
Dept: FAMILY MEDICINE CLINIC | Age: 69
End: 2019-11-18
Payer: MEDICARE

## 2019-11-18 VITALS
DIASTOLIC BLOOD PRESSURE: 86 MMHG | SYSTOLIC BLOOD PRESSURE: 152 MMHG | WEIGHT: 276 LBS | BODY MASS INDEX: 35.44 KG/M2 | RESPIRATION RATE: 16 BRPM | TEMPERATURE: 98.1 F | OXYGEN SATURATION: 98 % | HEART RATE: 68 BPM

## 2019-11-18 DIAGNOSIS — I10 ESSENTIAL HYPERTENSION: ICD-10-CM

## 2019-11-18 DIAGNOSIS — G89.29 CHRONIC MIDLINE THORACIC BACK PAIN: ICD-10-CM

## 2019-11-18 DIAGNOSIS — J01.90 ACUTE SINUSITIS, RECURRENCE NOT SPECIFIED, UNSPECIFIED LOCATION: ICD-10-CM

## 2019-11-18 DIAGNOSIS — M54.6 CHRONIC MIDLINE THORACIC BACK PAIN: ICD-10-CM

## 2019-11-18 PROCEDURE — G8427 DOCREV CUR MEDS BY ELIG CLIN: HCPCS | Performed by: NURSE PRACTITIONER

## 2019-11-18 PROCEDURE — 1123F ACP DISCUSS/DSCN MKR DOCD: CPT | Performed by: NURSE PRACTITIONER

## 2019-11-18 PROCEDURE — G8599 NO ASA/ANTIPLAT THER USE RNG: HCPCS | Performed by: NURSE PRACTITIONER

## 2019-11-18 PROCEDURE — G8484 FLU IMMUNIZE NO ADMIN: HCPCS | Performed by: NURSE PRACTITIONER

## 2019-11-18 PROCEDURE — 1036F TOBACCO NON-USER: CPT | Performed by: NURSE PRACTITIONER

## 2019-11-18 PROCEDURE — 4040F PNEUMOC VAC/ADMIN/RCVD: CPT | Performed by: NURSE PRACTITIONER

## 2019-11-18 PROCEDURE — 3017F COLORECTAL CA SCREEN DOC REV: CPT | Performed by: NURSE PRACTITIONER

## 2019-11-18 PROCEDURE — G8417 CALC BMI ABV UP PARAM F/U: HCPCS | Performed by: NURSE PRACTITIONER

## 2019-11-18 PROCEDURE — 99214 OFFICE O/P EST MOD 30 MIN: CPT | Performed by: NURSE PRACTITIONER

## 2019-11-18 RX ORDER — MELOXICAM 15 MG/1
15 TABLET ORAL DAILY
Qty: 30 TABLET | Refills: 0 | Status: SHIPPED | OUTPATIENT
Start: 2019-11-18 | End: 2019-12-03

## 2019-11-18 RX ORDER — AZITHROMYCIN 250 MG/1
250 TABLET, FILM COATED ORAL SEE ADMIN INSTRUCTIONS
Qty: 6 TABLET | Refills: 0 | Status: SHIPPED | OUTPATIENT
Start: 2019-11-18 | End: 2019-11-23

## 2019-11-18 ASSESSMENT — ENCOUNTER SYMPTOMS
GASTROINTESTINAL NEGATIVE: 1
SINUS PRESSURE: 1
FACIAL SWELLING: 0
COUGH: 0
BACK PAIN: 1
SHORTNESS OF BREATH: 0
SORE THROAT: 0

## 2019-12-03 ENCOUNTER — OFFICE VISIT (OUTPATIENT)
Dept: FAMILY MEDICINE CLINIC | Age: 69
End: 2019-12-03
Payer: MEDICARE

## 2019-12-03 VITALS
SYSTOLIC BLOOD PRESSURE: 152 MMHG | DIASTOLIC BLOOD PRESSURE: 84 MMHG | HEIGHT: 74 IN | BODY MASS INDEX: 35.81 KG/M2 | HEART RATE: 73 BPM | WEIGHT: 279 LBS | OXYGEN SATURATION: 97 %

## 2019-12-03 DIAGNOSIS — M54.16 LUMBAR RADICULOPATHY, ACUTE: Primary | ICD-10-CM

## 2019-12-03 PROCEDURE — 3017F COLORECTAL CA SCREEN DOC REV: CPT | Performed by: PHYSICIAN ASSISTANT

## 2019-12-03 PROCEDURE — G8599 NO ASA/ANTIPLAT THER USE RNG: HCPCS | Performed by: PHYSICIAN ASSISTANT

## 2019-12-03 PROCEDURE — 99213 OFFICE O/P EST LOW 20 MIN: CPT | Performed by: PHYSICIAN ASSISTANT

## 2019-12-03 PROCEDURE — 4040F PNEUMOC VAC/ADMIN/RCVD: CPT | Performed by: PHYSICIAN ASSISTANT

## 2019-12-03 PROCEDURE — G8417 CALC BMI ABV UP PARAM F/U: HCPCS | Performed by: PHYSICIAN ASSISTANT

## 2019-12-03 PROCEDURE — 1123F ACP DISCUSS/DSCN MKR DOCD: CPT | Performed by: PHYSICIAN ASSISTANT

## 2019-12-03 PROCEDURE — G8484 FLU IMMUNIZE NO ADMIN: HCPCS | Performed by: PHYSICIAN ASSISTANT

## 2019-12-03 PROCEDURE — 1036F TOBACCO NON-USER: CPT | Performed by: PHYSICIAN ASSISTANT

## 2019-12-03 PROCEDURE — G8427 DOCREV CUR MEDS BY ELIG CLIN: HCPCS | Performed by: PHYSICIAN ASSISTANT

## 2019-12-03 RX ORDER — TIZANIDINE 4 MG/1
4 TABLET ORAL NIGHTLY PRN
Qty: 30 TABLET | Refills: 1 | Status: SHIPPED | OUTPATIENT
Start: 2019-12-03 | End: 2021-01-21 | Stop reason: ALTCHOICE

## 2019-12-03 ASSESSMENT — ENCOUNTER SYMPTOMS
COLOR CHANGE: 0
BACK PAIN: 1

## 2019-12-04 ENCOUNTER — TELEPHONE (OUTPATIENT)
Dept: FAMILY MEDICINE CLINIC | Age: 69
End: 2019-12-04

## 2019-12-13 ENCOUNTER — HOSPITAL ENCOUNTER (OUTPATIENT)
Dept: MRI IMAGING | Age: 69
Discharge: HOME | End: 2019-12-13
Payer: MEDICARE

## 2019-12-13 DIAGNOSIS — M54.16 LUMBAR RADICULOPATHY, ACUTE: ICD-10-CM

## 2019-12-13 PROCEDURE — 72148 MRI LUMBAR SPINE W/O DYE: CPT

## 2019-12-17 ENCOUNTER — OFFICE VISIT (OUTPATIENT)
Dept: FAMILY MEDICINE CLINIC | Age: 69
End: 2019-12-17
Payer: MEDICARE

## 2019-12-17 VITALS
HEART RATE: 65 BPM | BODY MASS INDEX: 35.29 KG/M2 | DIASTOLIC BLOOD PRESSURE: 80 MMHG | SYSTOLIC BLOOD PRESSURE: 130 MMHG | OXYGEN SATURATION: 96 % | HEIGHT: 74 IN | WEIGHT: 275 LBS

## 2019-12-17 DIAGNOSIS — G89.29 CHRONIC MIDLINE THORACIC BACK PAIN: Primary | ICD-10-CM

## 2019-12-17 DIAGNOSIS — M48.00 CENTRAL STENOSIS OF SPINAL CANAL: ICD-10-CM

## 2019-12-17 DIAGNOSIS — M54.6 CHRONIC MIDLINE THORACIC BACK PAIN: Primary | ICD-10-CM

## 2019-12-17 PROCEDURE — G8484 FLU IMMUNIZE NO ADMIN: HCPCS | Performed by: PHYSICIAN ASSISTANT

## 2019-12-17 PROCEDURE — 3017F COLORECTAL CA SCREEN DOC REV: CPT | Performed by: PHYSICIAN ASSISTANT

## 2019-12-17 PROCEDURE — 99212 OFFICE O/P EST SF 10 MIN: CPT | Performed by: PHYSICIAN ASSISTANT

## 2019-12-17 PROCEDURE — G8417 CALC BMI ABV UP PARAM F/U: HCPCS | Performed by: PHYSICIAN ASSISTANT

## 2019-12-17 PROCEDURE — G8427 DOCREV CUR MEDS BY ELIG CLIN: HCPCS | Performed by: PHYSICIAN ASSISTANT

## 2019-12-17 PROCEDURE — 4040F PNEUMOC VAC/ADMIN/RCVD: CPT | Performed by: PHYSICIAN ASSISTANT

## 2019-12-17 PROCEDURE — G8599 NO ASA/ANTIPLAT THER USE RNG: HCPCS | Performed by: PHYSICIAN ASSISTANT

## 2019-12-17 PROCEDURE — 1036F TOBACCO NON-USER: CPT | Performed by: PHYSICIAN ASSISTANT

## 2019-12-17 PROCEDURE — 1123F ACP DISCUSS/DSCN MKR DOCD: CPT | Performed by: PHYSICIAN ASSISTANT

## 2019-12-17 ASSESSMENT — ENCOUNTER SYMPTOMS: BACK PAIN: 1

## 2020-01-13 NOTE — PROGRESS NOTES
ventricular hypertrophy is present.   Left ventricular diastolic filling pressure is normal.   Mild mitral regurgitation.   Underlying atrial flutter with variable conduction noted. CARDIOLOGY LABS:   CBC: No results for input(s): WBC, HGB, HCT, PLT in the last 72 hours. BMP: No results for input(s): NA, K, CO2, BUN, CREATININE, LABGLOM, GLUCOSE in the last 72 hours. PT/INR: No results for input(s): PROTIME, INR in the last 72 hours. APTT:No results for input(s): APTT in the last 72 hours. FASTING LIPID PANEL:  Lab Results   Component Value Date    HDL 39 10/14/2019    HDL 43 09/01/2011    LDLCALC 84 10/14/2019    TRIG 83 10/14/2019     LIVER PROFILE:No results for input(s): AST, ALT, ALB in the last 72 hours. Assessment:  1. Typical atrial flutter: stable   -s/p RFCA on 5/29/2018              -[id number] score 2 (age and HTN)  2. Paroxysmal atrial tachycardia: stable   -noted on Holter in 6/2018  3. Symptomatic PVCs: resolved  4. HTN: uncontrolled   5. HLD: on statin    Plan:   1. Continue HCTZ  2. Increase lisinopril to 40mg po QD  3. Stop ASA (no indication)  4. BMP in 1-2 weeks  5. Monitor BP at home and call if consistently out of goal ranges   6.  Follow up in one year     Bayron Tian [address]  [phone]

## 2020-01-15 ENCOUNTER — OFFICE VISIT (OUTPATIENT)
Dept: CARDIOLOGY CLINIC | Age: 70
End: 2020-01-15
Payer: MEDICARE

## 2020-01-15 VITALS
WEIGHT: 276 LBS | HEIGHT: 74 IN | OXYGEN SATURATION: 99 % | SYSTOLIC BLOOD PRESSURE: 160 MMHG | BODY MASS INDEX: 35.42 KG/M2 | DIASTOLIC BLOOD PRESSURE: 92 MMHG | HEART RATE: 67 BPM

## 2020-01-15 PROCEDURE — 99214 OFFICE O/P EST MOD 30 MIN: CPT | Performed by: NURSE PRACTITIONER

## 2020-01-15 PROCEDURE — G8484 FLU IMMUNIZE NO ADMIN: HCPCS | Performed by: NURSE PRACTITIONER

## 2020-01-15 PROCEDURE — 4040F PNEUMOC VAC/ADMIN/RCVD: CPT | Performed by: NURSE PRACTITIONER

## 2020-01-15 PROCEDURE — G8417 CALC BMI ABV UP PARAM F/U: HCPCS | Performed by: NURSE PRACTITIONER

## 2020-01-15 PROCEDURE — 1123F ACP DISCUSS/DSCN MKR DOCD: CPT | Performed by: NURSE PRACTITIONER

## 2020-01-15 PROCEDURE — 93000 ELECTROCARDIOGRAM COMPLETE: CPT | Performed by: NURSE PRACTITIONER

## 2020-01-15 PROCEDURE — 3017F COLORECTAL CA SCREEN DOC REV: CPT | Performed by: NURSE PRACTITIONER

## 2020-01-15 PROCEDURE — G8427 DOCREV CUR MEDS BY ELIG CLIN: HCPCS | Performed by: NURSE PRACTITIONER

## 2020-01-15 PROCEDURE — 1036F TOBACCO NON-USER: CPT | Performed by: NURSE PRACTITIONER

## 2020-01-15 RX ORDER — LISINOPRIL 20 MG/1
20 TABLET ORAL DAILY
Qty: 90 TABLET | Refills: 1 | Status: SHIPPED | OUTPATIENT
Start: 2020-01-15 | End: 2020-01-23

## 2020-01-15 NOTE — LETTER
[address]   Electrophysiology  Note              Date:  January 15, 2020  Patient name: Kerri Logan  YOB: 1950    Primary Care physician: Doris Prieto DO    HISTORY OF PRESENT ILLNESS: The patient is a 81 y.o.  male with a history of atrial flutter, PAT, PVCs, HTN, and HLD. He was admitted in 5/2018 with symptomatic atrial flutter with RVR. Echo in 5/2018 showed an EF of 55-60%. In 5/2018 he had RFCA of typical atrial flutter. Post ablation he complained of groin swelling and bruising and palpitations. Xarelto was stopped. A 48 hour Holter in 6/2018 showed asymptomatic PAT and rare PVCs. He has remained in sinus at subsequent visits and Cardizem was stopped in 10/2018. Today he is being seen for atrial flutter. EKG shows sinus rhythm with a HR of 64. He complains of back pain. Denies chest pain, palpitations, shortness of breath, and dizziness. Home BP is averaging 170/90. Stopped Cardizem by choice 9/2020. Past Medical History:   has a past medical history of Carotid stenosis, non-symptomatic, Hyperlipidemia, Hypertension, and Obesity. Past Surgical History:   has a past surgical history that includes Appendectomy; Dental surgery; and Cardiac surgery (05/2018). Home Medications:    Prior to Admission medications    Medication Sig Start Date End Date Taking? Authorizing Provider   tiZANidine (ZANAFLEX) 4 MG tablet Take 1 tablet by mouth nightly as needed (pain) 12/3/19  Yes JEAN-PAUL Bella   atorvastatin (LIPITOR) 10 MG tablet TAKE ONE TABLET BY MOUTH DAILY 10/14/19  Yes Chau Prieto DO   lisinopril-hydrochlorothiazide (PRINZIDE;ZESTORETIC) 20-25 MG per tablet TAKE ONE TABLET BY MOUTH DAILY 10/14/19  Yes Chau Prieto DO   aspirin 325 MG EC tablet Take 1 tablet by mouth daily 6/26/18  Yes CORY Whitmore CNP       Allergies:  Patient has no known allergies. Social History:   reports that he has never smoked.  He has never used smokeless tobacco. He reports that he does not drink alcohol or use drugs. Family History: family history includes Heart Disease in his brother. Review of Systems   Constitutional: Negative   HENT: Negative   Eyes: Negative. Respiratory: Negative. Cardiovascular: see HPI  Gastrointestinal: Negative. Genitourinary: Negative. Musculoskeletal: + back pain  Skin: Negative. Neurological: Negative. Hematological: Negative. Psychiatric/Behavioral: + anxiety     PHYSICAL EXAM:    Vital signs:    BP (!) 160/92   Pulse 67   Ht 6' 2\" (1.88 m)   Wt 276 lb (125.2 kg)   SpO2 99%   BMI 35.44 kg/m²       Constitutional and general appearance: alert, cooperative, no distress and appears stated age  HEENT: PERRL, no cervical lymphadenopathy. No masses palpable. Normal oral mucosa  Respiratory:  · Normal excursion and expansion without use of accessory muscles  · Resp auscultation: Normal breath sounds without dullness or wheezing  Cardiovascular:  · The apical impulse is not displaced  · Heart tones are crisp and normal. Regular S1 and S2.  · Jugular venous pulsation Normal  · The carotid upstroke is normal in amplitude and contour without delay or bruit  · Peripheral pulses are symmetrical and full   Abdomen:  · No masses or tenderness  · Bowel sounds present  Extremities:  ·  No cyanosis or clubbing  ·  No lower extremity edema  ·  Skin: warm and dry  Neurological:  · Alert and oriented  · Moves all extremities well  · No abnormalities of mood, affect, memory, mentation, or behavior are noted    DATA:    ECG 1/15/2020:  SR HR 64    NAOMI 5/30/2018:  Left ventricular systolic function is normal with ejection fraction estimated at 55%.   There is no evidence of mass or thrombus in the left atrium or appendage.   Mild mitral regurgitation.     Echo 5/17/2018:   Normal left ventricular systolic function with ejection fraction of 55-60%.   No regional wall motion abnormalites are seen.  Mild concentric left ventricular hypertrophy is present.   Left ventricular diastolic filling pressure is normal.   Mild mitral regurgitation.   Underlying atrial flutter with variable conduction noted. CARDIOLOGY LABS:   CBC: No results for input(s): WBC, HGB, HCT, PLT in the last 72 hours. BMP: No results for input(s): NA, K, CO2, BUN, CREATININE, LABGLOM, GLUCOSE in the last 72 hours. PT/INR: No results for input(s): PROTIME, INR in the last 72 hours. APTT:No results for input(s): APTT in the last 72 hours. FASTING LIPID PANEL:  Lab Results   Component Value Date    HDL 39 10/14/2019    HDL 43 09/01/2011    LDLCALC 84 10/14/2019    TRIG 83 10/14/2019     LIVER PROFILE:No results for input(s): AST, ALT, ALB in the last 72 hours. Assessment:  1. Typical atrial flutter: stable   -s/p RFCA on 5/29/2018              -[id number] score 2 (age and HTN)  2. Paroxysmal atrial tachycardia: stable   -noted on Holter in 6/2018  3. Symptomatic PVCs: resolved  4. HTN: uncontrolled   5. HLD: on statin    Plan:   1. Continue HCTZ  2. Increase lisinopril to 40mg po QD  3. Stop ASA (no indication)  4. BMP in 1-2 weeks  5. Monitor BP at home and call if consistently out of goal ranges   6. Follow up in one year     [place] Mooney [address]  [phone]   .

## 2020-01-23 ENCOUNTER — OFFICE VISIT (OUTPATIENT)
Dept: ORTHOPEDIC SURGERY | Age: 70
End: 2020-01-23
Payer: MEDICARE

## 2020-01-23 VITALS
SYSTOLIC BLOOD PRESSURE: 138 MMHG | WEIGHT: 276.02 LBS | BODY MASS INDEX: 35.42 KG/M2 | HEIGHT: 74 IN | HEART RATE: 76 BPM | DIASTOLIC BLOOD PRESSURE: 82 MMHG

## 2020-01-23 PROCEDURE — G8427 DOCREV CUR MEDS BY ELIG CLIN: HCPCS | Performed by: PHYSICAL MEDICINE & REHABILITATION

## 2020-01-23 PROCEDURE — 1123F ACP DISCUSS/DSCN MKR DOCD: CPT | Performed by: PHYSICAL MEDICINE & REHABILITATION

## 2020-01-23 PROCEDURE — 99203 OFFICE O/P NEW LOW 30 MIN: CPT | Performed by: PHYSICAL MEDICINE & REHABILITATION

## 2020-01-23 PROCEDURE — 3017F COLORECTAL CA SCREEN DOC REV: CPT | Performed by: PHYSICAL MEDICINE & REHABILITATION

## 2020-01-23 PROCEDURE — 4040F PNEUMOC VAC/ADMIN/RCVD: CPT | Performed by: PHYSICAL MEDICINE & REHABILITATION

## 2020-01-23 PROCEDURE — G8417 CALC BMI ABV UP PARAM F/U: HCPCS | Performed by: PHYSICAL MEDICINE & REHABILITATION

## 2020-01-23 PROCEDURE — G8484 FLU IMMUNIZE NO ADMIN: HCPCS | Performed by: PHYSICAL MEDICINE & REHABILITATION

## 2020-01-23 PROCEDURE — 1036F TOBACCO NON-USER: CPT | Performed by: PHYSICAL MEDICINE & REHABILITATION

## 2020-01-23 RX ORDER — MELOXICAM 15 MG/1
15 TABLET ORAL DAILY
Qty: 30 TABLET | Refills: 0 | Status: ON HOLD | OUTPATIENT
Start: 2020-01-23 | End: 2021-01-23 | Stop reason: HOSPADM

## 2020-01-23 RX ORDER — PREDNISONE 10 MG/1
TABLET ORAL
Qty: 26 TABLET | Refills: 0 | Status: SHIPPED | OUTPATIENT
Start: 2020-01-23 | End: 2020-11-23

## 2020-01-23 NOTE — PROGRESS NOTES
tablet by mouth nightly as needed (pain), Disp: 30 tablet, Rfl: 1    atorvastatin (LIPITOR) 10 MG tablet, TAKE ONE TABLET BY MOUTH DAILY, Disp: 90 tablet, Rfl: 3    lisinopril-hydrochlorothiazide (PRINZIDE;ZESTORETIC) 20-25 MG per tablet, TAKE ONE TABLET BY MOUTH DAILY, Disp: 90 tablet, Rfl: 3  Allergies:  Patient has no known allergies. Social History:    reports that he has never smoked. He has never used smokeless tobacco. He reports that he does not drink alcohol or use drugs. Family History:   Family History   Problem Relation Age of Onset    Heart Disease Brother         AAA rupture       REVIEW OF SYSTEMS: Full ROS noted & scanned   CONSTITUTIONAL: Denies unexplained weight loss, fevers, chills or fatigue  NEUROLOGICAL: Denies unsteady gait or progressive weakness  MUSCULOSKELETAL: Denies joint swelling or redness  PSYCHOLOGICAL: Denies anxiety, depression   SKIN: Denies skin changes, delayed healing, rash, itching   HEMATOLOGIC: Denies easy bleeding or bruising  ENDOCRINE: Denies excessive thirst, urination, heat/cold  RESPIRATORY: Denies current dyspnea, cough  GI: Denies nausea, vomiting, diarrhea   : Denies bowel or bladder issues       PHYSICAL EXAM:    Vitals: Blood pressure 138/82, pulse 76, height 6' 2.02\" (1.88 m), weight 276 lb 0.3 oz (125.2 kg). GENERAL EXAM:  · General Apparence: Patient is adequately groomed with no evidence of malnutrition. · Orientation: The patient is oriented to time, place and person. · Mood & Affect:The patient's mood and affect are appropriate   · Vascular: Examination reveals no swelling tenderness in upper or lower extremities. Good capillary refill  · Lymphatic: The lymphatic examination bilaterally reveals all areas to be without enlargement or induration  · Sensation: Sensation is intact without deficit  · Coordination/Balance: Good coordination     LUMBAR/SACRAL EXAMINATION:  · Inspection: Local inspection shows no step-off or bruising.   Lumbar alignment is normal.  Sagittal and Coronal balance is neutral.      · Palpation:   No evidence of tenderness at the midline. No tenderness bilaterally at the paraspinal or trochanters. There is no step-off or paraspinal spasm. · Range of Motion: Moderate loss of flexion extension  · Strength:   Strength testing is 5/5 in all muscle groups tested. · Special Tests:   Straight leg raise and crossed SLR negative. Leg length and pelvis level.  0 out of 5 Archana's signs. · Skin: There are no rashes, ulcerations or lesions. · Reflexes: Reflexes are symmetrically 2+ at the patellar and ankle tendons. Clonus absent bilaterally at the feet. · Gait & station: Normal gait  · Additional Examinations:   · RIGHT LOWER EXTREMITY: Inspection/examination of the right lower extremity does not show any tenderness, deformity or injury. Range of motion is full. There is no gross instability. There are no rashes, ulcerations or lesions. Strength and tone are normal.  ·   · LEFT LOWER EXTREMITY:  Inspection/examination of the left lower extremity does not show any tenderness, deformity or injury. Range of motion is full. There is no gross instability. There are no rashes, ulcerations or lesions. Strength and tone are normal.    Diagnostic Testing:      Recent lumbar MRI reviewed showing multilevel discogenic spondylosis superimposed moderate to advanced L4-5 central narrowing    Impression:    Chronic back pain  Lumbar stenosis  Discogenic spondylosis    Plan:     Pred taper followed by 2-week course of Mobic  Follow-up after if not improved.   We discussed interventional injections including epidurals and radiofrequency neurotomy    NICOLÁS Harrell

## 2020-08-27 ENCOUNTER — OFFICE VISIT (OUTPATIENT)
Dept: ORTHOPEDIC SURGERY | Age: 70
End: 2020-08-27
Payer: MEDICARE

## 2020-08-27 VITALS — BODY MASS INDEX: 35.42 KG/M2 | WEIGHT: 276.02 LBS | HEIGHT: 74 IN

## 2020-08-27 PROCEDURE — G8427 DOCREV CUR MEDS BY ELIG CLIN: HCPCS | Performed by: PHYSICIAN ASSISTANT

## 2020-08-27 PROCEDURE — 3017F COLORECTAL CA SCREEN DOC REV: CPT | Performed by: PHYSICIAN ASSISTANT

## 2020-08-27 PROCEDURE — 1123F ACP DISCUSS/DSCN MKR DOCD: CPT | Performed by: PHYSICIAN ASSISTANT

## 2020-08-27 PROCEDURE — 4040F PNEUMOC VAC/ADMIN/RCVD: CPT | Performed by: PHYSICIAN ASSISTANT

## 2020-08-27 PROCEDURE — 99214 OFFICE O/P EST MOD 30 MIN: CPT | Performed by: PHYSICIAN ASSISTANT

## 2020-08-27 PROCEDURE — G8417 CALC BMI ABV UP PARAM F/U: HCPCS | Performed by: PHYSICIAN ASSISTANT

## 2020-08-27 PROCEDURE — 1036F TOBACCO NON-USER: CPT | Performed by: PHYSICIAN ASSISTANT

## 2020-08-27 RX ORDER — METHYLPREDNISOLONE 4 MG/1
TABLET ORAL
Qty: 1 KIT | Refills: 0 | Status: SHIPPED | OUTPATIENT
Start: 2020-08-27 | End: 2020-11-23

## 2020-08-27 NOTE — PROGRESS NOTES
Follow-up SPINE    CHIEF COMPLAINT:    Chief Complaint   Patient presents with    Back Pain       HISTORY OF PRESENT ILLNESS:                The patient is a 78 y.o. male last seen by Dr. Eusebio Gautam January 2020 here for acute/chronic recurrent aching right low back pain. Symptoms have been flared up over the last 1 week without trauma. Pain is typically increased first thing in the morning and will improve as the day progresses. Recent conservative care includes ibuprofen, ice. Back in January was placed on oral steroids with significant benefit. He currently denies any distal radiating pain, no numbness tingling or weakness. No recent bowel or bladder changes. Pain Assessment  Location of Pain: Back  Severity of Pain: 4  Quality of Pain: Sharp, Dull, Aching  Duration of Pain: Persistent  Frequency of Pain: Constant  Aggravating Factors: Stairs, Walking, Standing, Squatting, Kneeling, Exercise, Straightening, Stretching, Bending  Limiting Behavior: Yes  Relieving Factors: Rest  Result of Injury: No  Work-Related Injury: No  Are there other pain locations you wish to document?: No    The pain assessment was noted & reviewed in the medical record today.      Current/Past Treatment:   · Physical Therapy:   · Chiropractic:     · Injection:     Medications:            NSAIDS: Ibuprofen            Muscle relaxer:  zanaflex            Steriods:   Past prednisone            Neuropathic medications:              Opioids:            Other:   · Surgery/Consult:    Work Status/Functionality: Braden [employer], tobacco    Past Medical History: Medical history form was reviewed today & scanned into the media tab  Past Medical History:   Diagnosis Date    Carotid stenosis, non-symptomatic 6/3/2015    Hyperlipidemia     Hypertension     Obesity 5/17/2018      Past Surgical History:     Past Surgical History:   Procedure Laterality Date    APPENDECTOMY      CARDIAC SURGERY  05/2018    ablation    [facility] DENTAL SURGERY       Current Medications:     Current Outpatient Medications:     predniSONE (DELTASONE) 10 MG tablet, Take 3 tabs bid x2days, then 2 tabs bid x2days, then 1 tab bid x2days, then 1 tab qd x2days, Disp: 26 tablet, Rfl: 0    meloxicam (MOBIC) 15 MG tablet, Take 1 tablet by mouth daily, Disp: 30 tablet, Rfl: 0    tiZANidine (ZANAFLEX) 4 MG tablet, Take 1 tablet by mouth nightly as needed (pain), Disp: 30 tablet, Rfl: 1    atorvastatin (LIPITOR) 10 MG tablet, TAKE ONE TABLET BY MOUTH DAILY, Disp: 90 tablet, Rfl: 3    lisinopril-hydrochlorothiazide (PRINZIDE;ZESTORETIC) 20-25 MG per tablet, TAKE ONE TABLET BY MOUTH DAILY, Disp: 90 tablet, Rfl: 3  Allergies:  Patient has no known allergies. Social History:    reports that he has never smoked. He has never used smokeless tobacco. He reports that he does not drink alcohol or use drugs. Family History:   Family History   Problem Relation Age of Onset    Heart Disease Brother         AAA rupture       REVIEW OF SYSTEMS: Full ROS noted & scanned   CONSTITUTIONAL: Denies unexplained weight loss, fevers, chills or fatigue  NEUROLOGICAL: Denies unsteady gait or progressive weakness    PHYSICAL EXAM:    Vitals: Height 6' 2.02\" (1.88 m), weight 276 lb 0.3 oz (125.2 kg). GENERAL EXAM:  · General Apparence: Patient is adequately groomed with no evidence of malnutrition. · Orientation: The patient is oriented to time, place and person. · Mood & Affect:The patient's mood and affect are appropriate   · Lymphatic: The lymphatic examination bilaterally reveals all areas to be without enlargement or induration  · Sensation: Sensation is intact without deficit  · Coordination/Balance: Good coordination     LUMBAR/SACRAL EXAMINATION:  · Inspection: Local inspection shows no step-off or bruising. Lumbar alignment is normal.  Sagittal and Coronal balance is neutral.      · Palpation:   No evidence of tenderness at the midline. No tenderness bilaterally at the paraspinal or trochanters. There is no step-off or paraspinal spasm. · Range of Motion: Moderate loss of flexion extension--- more pain with extension  · Strength:   Strength testing is 5/5 in all muscle groups tested. · Special Tests:   Straight leg raise and crossed SLR negative. Leg length and pelvis level.  0 out of 5 Archana's signs. · Skin: There are no rashes, ulcerations or lesions. · Reflexes: Reflexes are symmetrically 2+ at the patellar and ankle tendons. Clonus absent bilaterally at the feet. · Gait & station: Normal gait  · Additional Examinations:   · RIGHT LOWER EXTREMITY: Inspection/examination of the right lower extremity does not show any tenderness, deformity or injury. Range of motion is full. There is no gross instability. There are no rashes, ulcerations or lesions. Strength and tone are normal.  ·   · LEFT LOWER EXTREMITY:  Inspection/examination of the left lower extremity does not show any tenderness, deformity or injury. Range of motion is full. There is no gross instability. There are no rashes, ulcerations or lesions. Strength and tone are normal.    Diagnostic Testing:    Recent lumbar MRI reviewed showing multilevel discogenic spondylosis superimposed moderate to advanced L4-5 central narrowing. L5 hemangioma, atypical S3 hemangioma. 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 He is hoping to avoid repeat MRI.         [facility]

## 2020-11-23 ENCOUNTER — OFFICE VISIT (OUTPATIENT)
Dept: FAMILY MEDICINE CLINIC | Age: 70
End: 2020-11-23
Payer: MEDICARE

## 2020-11-23 VITALS
BODY MASS INDEX: 35.86 KG/M2 | DIASTOLIC BLOOD PRESSURE: 75 MMHG | WEIGHT: 279.4 LBS | SYSTOLIC BLOOD PRESSURE: 140 MMHG | HEART RATE: 72 BPM | OXYGEN SATURATION: 93 % | HEIGHT: 74 IN | TEMPERATURE: 97.2 F

## 2020-11-23 PROBLEM — E66.01 MORBIDLY OBESE (HCC): Status: ACTIVE | Noted: 2018-05-17

## 2020-11-23 LAB
A/G RATIO: 1.8 (ref 1.1–2.2)
ALBUMIN SERPL-MCNC: 4.2 G/DL (ref 3.4–5)
ALP BLD-CCNC: 58 U/L (ref 40–129)
ALT SERPL-CCNC: 28 U/L (ref 10–40)
ANION GAP SERPL CALCULATED.3IONS-SCNC: 9 MMOL/L (ref 3–16)
AST SERPL-CCNC: 19 U/L (ref 15–37)
BASOPHILS ABSOLUTE: 0 K/UL (ref 0–0.2)
BASOPHILS RELATIVE PERCENT: 0.5 %
BILIRUB SERPL-MCNC: 0.5 MG/DL (ref 0–1)
BUN BLDV-MCNC: 25 MG/DL (ref 7–20)
CALCIUM SERPL-MCNC: 9.3 MG/DL (ref 8.3–10.6)
CHLORIDE BLD-SCNC: 107 MMOL/L (ref 99–110)
CHOLESTEROL, TOTAL: 159 MG/DL (ref 0–199)
CO2: 31 MMOL/L (ref 21–32)
CREAT SERPL-MCNC: 0.7 MG/DL (ref 0.8–1.3)
EOSINOPHILS ABSOLUTE: 0.2 K/UL (ref 0–0.6)
EOSINOPHILS RELATIVE PERCENT: 2.4 %
GFR AFRICAN AMERICAN: >60
GFR NON-AFRICAN AMERICAN: >60
GLOBULIN: 2.4 G/DL
GLUCOSE BLD-MCNC: 117 MG/DL (ref 70–99)
HCT VFR BLD CALC: 43.6 % (ref 40.5–52.5)
HDLC SERPL-MCNC: 38 MG/DL (ref 40–60)
HEMOGLOBIN: 14.4 G/DL (ref 13.5–17.5)
LDL CHOLESTEROL CALCULATED: 101 MG/DL
LYMPHOCYTES ABSOLUTE: 1.4 K/UL (ref 1–5.1)
LYMPHOCYTES RELATIVE PERCENT: 20.6 %
MCH RBC QN AUTO: 30.2 PG (ref 26–34)
MCHC RBC AUTO-ENTMCNC: 33.1 G/DL (ref 31–36)
MCV RBC AUTO: 91.5 FL (ref 80–100)
MONOCYTES ABSOLUTE: 0.3 K/UL (ref 0–1.3)
MONOCYTES RELATIVE PERCENT: 5.1 %
NEUTROPHILS ABSOLUTE: 4.8 K/UL (ref 1.7–7.7)
NEUTROPHILS RELATIVE PERCENT: 71.4 %
PDW BLD-RTO: 13.8 % (ref 12.4–15.4)
PLATELET # BLD: 181 K/UL (ref 135–450)
PMV BLD AUTO: 7.8 FL (ref 5–10.5)
POTASSIUM SERPL-SCNC: 3.7 MMOL/L (ref 3.5–5.1)
PROSTATE SPECIFIC ANTIGEN: 0.88 NG/ML (ref 0–4)
RBC # BLD: 4.77 M/UL (ref 4.2–5.9)
SODIUM BLD-SCNC: 147 MMOL/L (ref 136–145)
TOTAL PROTEIN: 6.6 G/DL (ref 6.4–8.2)
TRIGL SERPL-MCNC: 101 MG/DL (ref 0–150)
VLDLC SERPL CALC-MCNC: 20 MG/DL
WBC # BLD: 6.7 K/UL (ref 4–11)

## 2020-11-23 PROCEDURE — 99397 PER PM REEVAL EST PAT 65+ YR: CPT | Performed by: FAMILY MEDICINE

## 2020-11-23 PROCEDURE — G8484 FLU IMMUNIZE NO ADMIN: HCPCS | Performed by: FAMILY MEDICINE

## 2020-11-23 RX ORDER — PREDNISONE 20 MG/1
20 TABLET ORAL 2 TIMES DAILY
Qty: 12 TABLET | Refills: 0 | Status: SHIPPED | OUTPATIENT
Start: 2020-11-23 | End: 2021-01-21 | Stop reason: ALTCHOICE

## 2020-11-23 RX ORDER — LISINOPRIL AND HYDROCHLOROTHIAZIDE 25; 20 MG/1; MG/1
TABLET ORAL
Qty: 90 TABLET | Refills: 3 | Status: SHIPPED | OUTPATIENT
Start: 2020-11-23 | End: 2021-12-15 | Stop reason: SDUPTHER

## 2020-11-23 RX ORDER — ATORVASTATIN CALCIUM 10 MG/1
TABLET, FILM COATED ORAL
Qty: 90 TABLET | Refills: 3 | Status: ON HOLD
Start: 2020-11-23 | End: 2021-01-23 | Stop reason: HOSPADM

## 2020-11-23 ASSESSMENT — ENCOUNTER SYMPTOMS
BLOOD IN STOOL: 0
ABDOMINAL PAIN: 0
CONSTIPATION: 0
COUGH: 0
SHORTNESS OF BREATH: 0
BACK PAIN: 1
CHEST TIGHTNESS: 0

## 2020-11-23 ASSESSMENT — PATIENT HEALTH QUESTIONNAIRE - PHQ9
SUM OF ALL RESPONSES TO PHQ9 QUESTIONS 1 & 2: 0
2. FEELING DOWN, DEPRESSED OR HOPELESS: 0
SUM OF ALL RESPONSES TO PHQ QUESTIONS 1-9: 0
1. LITTLE INTEREST OR PLEASURE IN DOING THINGS: 0
SUM OF ALL RESPONSES TO PHQ QUESTIONS 1-9: 0
SUM OF ALL RESPONSES TO PHQ QUESTIONS 1-9: 0

## 2020-11-23 NOTE — PATIENT INSTRUCTIONS
Annual physical exam  AGC-always be looking to drop a few pounds by reducing carbs and increasing activity as tolerated  Essential hypertension  -     CBC Auto Differential  -     Comprehensive Metabolic Panel  -     Lipid Panel  Continue medications and no added salt diet-work on weight loss as above-lab today  Morbidly obese (HCC)  Weight loss as above  Special screening for malignant neoplasm of prostate  -     Psa screening  Lab now  Colon cancer screening  -     COLONOSCOPY (Screening)  Declining colonoscopy or Cologuard  Other orders  -     predniSONE (DELTASONE) 20 MG tablet; Take 1 tablet by mouth 2 times daily  -     atorvastatin (LIPITOR) 10 MG tablet; TAKE ONE TABLET BY MOUTH DAILY  -     lisinopril-hydroCHLOROthiazide (PRINZIDE;ZESTORETIC) 20-25 MG per tablet; TAKE ONE TABLET BY MOUTH DAILY  Acute thoracic pain prescription was sent for prednisone 20 mg twice a day for 6 days.   See me 1 year

## 2020-11-23 NOTE — PROGRESS NOTES
Subjective:      Patient ID: Johanne Lovett is a 78 y.o. male. HPI  Patient in for annual exam and several medical issues. Overall he is doing very well. Hypertension blood pressure 140/80 or below when he checks it at home or elsewhere. Obesity-he states that he has a very difficult time losing weight. He is declining Cologuard and colonoscopy. He is due for lab work today. He is up-to-date on pneumonia shots. He states for the last month he has had some discomfort which is somewhat more than a nuisance in his mid thoracic spine. He denies any known trauma. He does have a chronic low back pain which he had shot for 1 year ago and did help some. That is really not an issue at this time. He denies any other issues to discuss. He states he had cardiac ablation about 3 years ago and was seeing cardiology once a year and he is due now but no one has called him and he states he will call them and see if they still need to see him. Prior to Visit Medications :  Medication predniSONE (DELTASONE) 20 MG tablet, Sig Take 1 tablet by mouth 2 times daily, Taking? Yes, Authorizing Provider Chau Prieto, DO    Medication atorvastatin (LIPITOR) 10 MG tablet, Sig TAKE ONE TABLET BY MOUTH DAILY, Taking? Yes, Authorizing Provider Chau Prieto, DO    Medication lisinopril-hydroCHLOROthiazide (PRINZIDE;ZESTORETIC) 20-25 MG per tablet, Sig TAKE ONE TABLET BY MOUTH DAILY, Taking?  Yes, Authorizing Provider Ry Prieto, DO    Medication meloxicam (MOBIC) 15 MG tablet, Sig Take 1 tablet by mouth daily  Patient not taking: Reported on 11/23/2020, Taking? , Authorizing Provider Tyronne Snellen, MD    Medication tiZANidine (ZANAFLEX) 4 MG tablet, Sig Take 1 tablet by mouth nightly as needed (pain)  Patient not taking: Reported on 11/23/2020, Taking? , Authorizing Provider JEAN-PAUL Aguillon      Past Medical History:  6/3/2015: Carotid stenosis, non-symptomatic  No date: Hyperlipidemia  No date: Hypertension  5/17/2018: Obesity        Review of Systems    Review of Systems   Constitutional: Negative for fever and unexpected weight change. HENT: Negative for congestion and postnasal drip. Eyes: Negative for visual disturbance. Respiratory: Negative for cough, chest tightness and shortness of breath. Cardiovascular: Negative for chest pain, palpitations and leg swelling. Gastrointestinal: Negative for abdominal pain, blood in stool and constipation. Genitourinary: Negative for frequency and hematuria. Musculoskeletal: Positive for arthralgias and back pain. Negative for myalgias. See HPI. Skin: Negative for rash. Neurological: Negative for tremors and headaches. Psychiatric/Behavioral: Negative for sleep disturbance. The patient is not nervous/anxious. Objective:   Physical Exam      Physical Exam  Constitutional:       Appearance: Normal appearance. He is well-developed. He is obese. HENT:      Head: Normocephalic. Mouth/Throat:      Mouth: Mucous membranes are moist.      Pharynx: Oropharynx is clear. Eyes:      Conjunctiva/sclera: Conjunctivae normal.   Neck:      Musculoskeletal: Neck supple. Thyroid: No thyromegaly. Vascular: No carotid bruit. Cardiovascular:      Rate and Rhythm: Normal rate and regular rhythm. Heart sounds: Normal heart sounds. Pulmonary:      Effort: Pulmonary effort is normal.      Breath sounds: Normal breath sounds. Abdominal:      General: There is no distension. Palpations: Abdomen is soft. There is no mass. Tenderness: There is no abdominal tenderness. Musculoskeletal:      Comments: Nontender thoracic paravertebral musculature or spinous processes-any side bending does cause an increase in the discomfort. Lymphadenopathy:      Cervical: No cervical adenopathy. Skin:     General: Skin is warm and dry. Neurological:      Mental Status: He is alert and oriented to person, place, and time.

## 2020-11-24 LAB
ESTIMATED AVERAGE GLUCOSE: 128.4 MG/DL
HBA1C MFR BLD: 6.1 %

## 2021-01-21 ENCOUNTER — APPOINTMENT (OUTPATIENT)
Dept: GENERAL RADIOLOGY | Age: 71
DRG: 247 | End: 2021-01-21
Payer: MEDICARE

## 2021-01-21 ENCOUNTER — HOSPITAL ENCOUNTER (INPATIENT)
Age: 71
LOS: 2 days | Discharge: HOME | DRG: 247 | End: 2021-01-23
Attending: EMERGENCY MEDICINE | Admitting: INTERNAL MEDICINE
Payer: MEDICARE

## 2021-01-21 DIAGNOSIS — I21.19 ACUTE ST ELEVATION MYOCARDIAL INFARCTION (STEMI) INVOLVING OTHER CORONARY ARTERY OF INFERIOR WALL (HCC): Primary | ICD-10-CM

## 2021-01-21 PROBLEM — E87.6 HYPOKALEMIA: Status: ACTIVE | Noted: 2021-01-21

## 2021-01-21 PROBLEM — I21.11 STEMI INVOLVING RIGHT CORONARY ARTERY (HCC): Status: ACTIVE | Noted: 2021-01-21

## 2021-01-21 PROBLEM — I21.3 STEMI (ST ELEVATION MYOCARDIAL INFARCTION) (HCC): Status: ACTIVE | Noted: 2021-01-21

## 2021-01-21 LAB
A/G RATIO: 1.5 (ref 1.1–2.2)
ALBUMIN SERPL-MCNC: 4 G/DL (ref 3.4–5)
ALP BLD-CCNC: 53 U/L (ref 40–129)
ALT SERPL-CCNC: 33 U/L (ref 10–40)
ANION GAP SERPL CALCULATED.3IONS-SCNC: 10 MMOL/L (ref 3–16)
APTT: 25.4 SEC (ref 24.2–36.2)
APTT: 35 SEC (ref 24.2–36.2)
APTT: 63.2 SEC (ref 24.2–36.2)
AST SERPL-CCNC: 17 U/L (ref 15–37)
BASOPHILS ABSOLUTE: 0.1 K/UL (ref 0–0.2)
BASOPHILS RELATIVE PERCENT: 0.9 %
BILIRUB SERPL-MCNC: 0.4 MG/DL (ref 0–1)
BUN BLDV-MCNC: 29 MG/DL (ref 7–20)
CALCIUM SERPL-MCNC: 9.5 MG/DL (ref 8.3–10.6)
CHLORIDE BLD-SCNC: 106 MMOL/L (ref 99–110)
CO2: 28 MMOL/L (ref 21–32)
CREAT SERPL-MCNC: 0.9 MG/DL (ref 0.8–1.3)
EKG ATRIAL RATE: 70 BPM
EKG DIAGNOSIS: NORMAL
EKG P AXIS: 49 DEGREES
EKG P-R INTERVAL: 170 MS
EKG Q-T INTERVAL: 406 MS
EKG QRS DURATION: 104 MS
EKG QTC CALCULATION (BAZETT): 438 MS
EKG R AXIS: 45 DEGREES
EKG T AXIS: 98 DEGREES
EKG VENTRICULAR RATE: 70 BPM
EOSINOPHILS ABSOLUTE: 0.2 K/UL (ref 0–0.6)
EOSINOPHILS RELATIVE PERCENT: 2.6 %
GFR AFRICAN AMERICAN: >60
GFR NON-AFRICAN AMERICAN: >60
GLOBULIN: 2.7 G/DL
GLUCOSE BLD-MCNC: 102 MG/DL (ref 70–99)
GLUCOSE BLD-MCNC: 103 MG/DL (ref 70–99)
GLUCOSE BLD-MCNC: 107 MG/DL (ref 70–99)
GLUCOSE BLD-MCNC: 203 MG/DL (ref 70–99)
HCT VFR BLD CALC: 40.2 % (ref 40.5–52.5)
HCT VFR BLD CALC: 43.2 % (ref 40.5–52.5)
HEMOGLOBIN: 13.4 G/DL (ref 13.5–17.5)
HEMOGLOBIN: 14.1 G/DL (ref 13.5–17.5)
INR BLD: 1.04 (ref 0.86–1.14)
LYMPHOCYTES ABSOLUTE: 2.6 K/UL (ref 1–5.1)
LYMPHOCYTES RELATIVE PERCENT: 29.8 %
MAGNESIUM: 1.8 MG/DL (ref 1.8–2.4)
MCH RBC QN AUTO: 29.7 PG (ref 26–34)
MCH RBC QN AUTO: 30.3 PG (ref 26–34)
MCHC RBC AUTO-ENTMCNC: 32.7 G/DL (ref 31–36)
MCHC RBC AUTO-ENTMCNC: 33.3 G/DL (ref 31–36)
MCV RBC AUTO: 90.7 FL (ref 80–100)
MCV RBC AUTO: 90.8 FL (ref 80–100)
MONOCYTES ABSOLUTE: 0.6 K/UL (ref 0–1.3)
MONOCYTES RELATIVE PERCENT: 6.9 %
NEUTROPHILS ABSOLUTE: 5.2 K/UL (ref 1.7–7.7)
NEUTROPHILS RELATIVE PERCENT: 59.8 %
PDW BLD-RTO: 13.4 % (ref 12.4–15.4)
PDW BLD-RTO: 13.6 % (ref 12.4–15.4)
PERFORMED ON: ABNORMAL
PLATELET # BLD: 192 K/UL (ref 135–450)
PLATELET # BLD: 226 K/UL (ref 135–450)
PMV BLD AUTO: 7.1 FL (ref 5–10.5)
PMV BLD AUTO: 7.2 FL (ref 5–10.5)
POC ACT LR: 182 SEC
POC ACT LR: 227 SEC
POC ACT LR: 265 SEC
POC ACT LR: 291 SEC
POTASSIUM REFLEX MAGNESIUM: 3.4 MMOL/L (ref 3.5–5.1)
PROTHROMBIN TIME: 12.1 SEC (ref 10–13.2)
RBC # BLD: 4.42 M/UL (ref 4.2–5.9)
RBC # BLD: 4.77 M/UL (ref 4.2–5.9)
SARS-COV-2, NAAT: NOT DETECTED
SODIUM BLD-SCNC: 144 MMOL/L (ref 136–145)
TOTAL PROTEIN: 6.7 G/DL (ref 6.4–8.2)
TROPONIN: 1.37 NG/ML
TROPONIN: 2.4 NG/ML
TROPONIN: <0.01 NG/ML
WBC # BLD: 8.8 K/UL (ref 4–11)
WBC # BLD: 9.9 K/UL (ref 4–11)

## 2021-01-21 PROCEDURE — 84484 ASSAY OF TROPONIN QUANT: CPT

## 2021-01-21 PROCEDURE — C1894 INTRO/SHEATH, NON-LASER: HCPCS

## 2021-01-21 PROCEDURE — 85610 PROTHROMBIN TIME: CPT

## 2021-01-21 PROCEDURE — 85027 COMPLETE CBC AUTOMATED: CPT

## 2021-01-21 PROCEDURE — 6360000002 HC RX W HCPCS: Performed by: INTERNAL MEDICINE

## 2021-01-21 PROCEDURE — 2000000000 HC ICU R&B

## 2021-01-21 PROCEDURE — U0003 INFECTIOUS AGENT DETECTION BY NUCLEIC ACID (DNA OR RNA); SEVERE ACUTE RESPIRATORY SYNDROME CORONAVIRUS 2 (SARS-COV-2) (CORONAVIRUS DISEASE [COVID-19]), AMPLIFIED PROBE TECHNIQUE, MAKING USE OF HIGH THROUGHPUT TECHNOLOGIES AS DESCRIBED BY CMS-2020-01-R: HCPCS

## 2021-01-21 PROCEDURE — 93458 L HRT ARTERY/VENTRICLE ANGIO: CPT | Performed by: INTERNAL MEDICINE

## 2021-01-21 PROCEDURE — 85025 COMPLETE CBC W/AUTO DIFF WBC: CPT

## 2021-01-21 PROCEDURE — 99152 MOD SED SAME PHYS/QHP 5/>YRS: CPT | Performed by: INTERNAL MEDICINE

## 2021-01-21 PROCEDURE — 6360000002 HC RX W HCPCS: Performed by: EMERGENCY MEDICINE

## 2021-01-21 PROCEDURE — 2709999900 HC NON-CHARGEABLE SUPPLY

## 2021-01-21 PROCEDURE — 92941 PRQ TRLML REVSC TOT OCCL AMI: CPT | Performed by: INTERNAL MEDICINE

## 2021-01-21 PROCEDURE — 71045 X-RAY EXAM CHEST 1 VIEW: CPT

## 2021-01-21 PROCEDURE — C1874 STENT, COATED/COV W/DEL SYS: HCPCS

## 2021-01-21 PROCEDURE — C9606 PERC D-E COR REVASC W AMI S: HCPCS

## 2021-01-21 PROCEDURE — 6360000002 HC RX W HCPCS

## 2021-01-21 PROCEDURE — 027035Z DILATION OF CORONARY ARTERY, ONE ARTERY WITH TWO DRUG-ELUTING INTRALUMINAL DEVICES, PERCUTANEOUS APPROACH: ICD-10-PCS | Performed by: INTERNAL MEDICINE

## 2021-01-21 PROCEDURE — 93010 ELECTROCARDIOGRAM REPORT: CPT | Performed by: INTERNAL MEDICINE

## 2021-01-21 PROCEDURE — C1725 CATH, TRANSLUMIN NON-LASER: HCPCS

## 2021-01-21 PROCEDURE — U0002 COVID-19 LAB TEST NON-CDC: HCPCS

## 2021-01-21 PROCEDURE — 99291 CRITICAL CARE FIRST HOUR: CPT | Performed by: INTERNAL MEDICINE

## 2021-01-21 PROCEDURE — 85730 THROMBOPLASTIN TIME PARTIAL: CPT

## 2021-01-21 PROCEDURE — 2580000003 HC RX 258: Performed by: INTERNAL MEDICINE

## 2021-01-21 PROCEDURE — 2720000010 HC SURG SUPPLY STERILE

## 2021-01-21 PROCEDURE — 99153 MOD SED SAME PHYS/QHP EA: CPT

## 2021-01-21 PROCEDURE — 2500000003 HC RX 250 WO HCPCS

## 2021-01-21 PROCEDURE — 93458 L HRT ARTERY/VENTRICLE ANGIO: CPT

## 2021-01-21 PROCEDURE — 80053 COMPREHEN METABOLIC PANEL: CPT

## 2021-01-21 PROCEDURE — B2111ZZ FLUOROSCOPY OF MULTIPLE CORONARY ARTERIES USING LOW OSMOLAR CONTRAST: ICD-10-PCS | Performed by: INTERNAL MEDICINE

## 2021-01-21 PROCEDURE — 6370000000 HC RX 637 (ALT 250 FOR IP): Performed by: INTERNAL MEDICINE

## 2021-01-21 PROCEDURE — 6370000000 HC RX 637 (ALT 250 FOR IP): Performed by: EMERGENCY MEDICINE

## 2021-01-21 PROCEDURE — 85347 COAGULATION TIME ACTIVATED: CPT

## 2021-01-21 PROCEDURE — 93005 ELECTROCARDIOGRAM TRACING: CPT | Performed by: EMERGENCY MEDICINE

## 2021-01-21 PROCEDURE — C1887 CATHETER, GUIDING: HCPCS

## 2021-01-21 PROCEDURE — 2580000003 HC RX 258: Performed by: EMERGENCY MEDICINE

## 2021-01-21 PROCEDURE — 99284 EMERGENCY DEPT VISIT MOD MDM: CPT

## 2021-01-21 PROCEDURE — 83036 HEMOGLOBIN GLYCOSYLATED A1C: CPT

## 2021-01-21 PROCEDURE — 99152 MOD SED SAME PHYS/QHP 5/>YRS: CPT

## 2021-01-21 PROCEDURE — C1769 GUIDE WIRE: HCPCS

## 2021-01-21 PROCEDURE — 36415 COLL VENOUS BLD VENIPUNCTURE: CPT

## 2021-01-21 PROCEDURE — 4A023N7 MEASUREMENT OF CARDIAC SAMPLING AND PRESSURE, LEFT HEART, PERCUTANEOUS APPROACH: ICD-10-PCS | Performed by: INTERNAL MEDICINE

## 2021-01-21 PROCEDURE — 6360000004 HC RX CONTRAST MEDICATION

## 2021-01-21 PROCEDURE — 83735 ASSAY OF MAGNESIUM: CPT

## 2021-01-21 RX ORDER — ONDANSETRON 2 MG/ML
4 INJECTION INTRAMUSCULAR; INTRAVENOUS EVERY 6 HOURS PRN
Status: DISCONTINUED | OUTPATIENT
Start: 2021-01-21 | End: 2021-01-23 | Stop reason: HOSPADM

## 2021-01-21 RX ORDER — MIDAZOLAM HYDROCHLORIDE 5 MG/ML
INJECTION INTRAMUSCULAR; INTRAVENOUS
Status: COMPLETED | OUTPATIENT
Start: 2021-01-21 | End: 2021-01-21

## 2021-01-21 RX ORDER — 0.9 % SODIUM CHLORIDE 0.9 %
INTRAVENOUS SOLUTION INTRAVENOUS CONTINUOUS PRN
Status: COMPLETED | OUTPATIENT
Start: 2021-01-21 | End: 2021-01-21

## 2021-01-21 RX ORDER — ATORVASTATIN CALCIUM 80 MG/1
80 TABLET, FILM COATED ORAL NIGHTLY
Status: DISCONTINUED | OUTPATIENT
Start: 2021-01-21 | End: 2021-01-21

## 2021-01-21 RX ORDER — HEPARIN SODIUM 10000 [USP'U]/100ML
1000 INJECTION, SOLUTION INTRAVENOUS CONTINUOUS
Status: DISCONTINUED | OUTPATIENT
Start: 2021-01-21 | End: 2021-01-21

## 2021-01-21 RX ORDER — ATORVASTATIN CALCIUM 80 MG/1
80 TABLET, FILM COATED ORAL NIGHTLY
Status: DISCONTINUED | OUTPATIENT
Start: 2021-01-21 | End: 2021-01-23 | Stop reason: HOSPADM

## 2021-01-21 RX ORDER — ACETAMINOPHEN 325 MG/1
650 TABLET ORAL EVERY 6 HOURS PRN
Status: DISCONTINUED | OUTPATIENT
Start: 2021-01-21 | End: 2021-01-23 | Stop reason: HOSPADM

## 2021-01-21 RX ORDER — FENTANYL CITRATE 50 UG/ML
INJECTION, SOLUTION INTRAMUSCULAR; INTRAVENOUS
Status: COMPLETED | OUTPATIENT
Start: 2021-01-21 | End: 2021-01-21

## 2021-01-21 RX ORDER — LISINOPRIL 20 MG/1
20 TABLET ORAL DAILY
Status: DISCONTINUED | OUTPATIENT
Start: 2021-01-21 | End: 2021-01-23 | Stop reason: HOSPADM

## 2021-01-21 RX ORDER — POTASSIUM CHLORIDE 20 MEQ/1
40 TABLET, EXTENDED RELEASE ORAL ONCE
Status: COMPLETED | OUTPATIENT
Start: 2021-01-21 | End: 2021-01-21

## 2021-01-21 RX ORDER — ATORVASTATIN CALCIUM 40 MG/1
40 TABLET, FILM COATED ORAL DAILY
Status: DISCONTINUED | OUTPATIENT
Start: 2021-01-21 | End: 2021-01-21

## 2021-01-21 RX ORDER — HEPARIN SODIUM 1000 [USP'U]/ML
INJECTION, SOLUTION INTRAVENOUS; SUBCUTANEOUS
Status: COMPLETED | OUTPATIENT
Start: 2021-01-21 | End: 2021-01-21

## 2021-01-21 RX ORDER — HEPARIN SODIUM 1000 [USP'U]/ML
4000 INJECTION, SOLUTION INTRAVENOUS; SUBCUTANEOUS ONCE
Status: DISCONTINUED | OUTPATIENT
Start: 2021-01-21 | End: 2021-01-21

## 2021-01-21 RX ORDER — POLYETHYLENE GLYCOL 3350 17 G/17G
17 POWDER, FOR SOLUTION ORAL DAILY PRN
Status: DISCONTINUED | OUTPATIENT
Start: 2021-01-21 | End: 2021-01-23 | Stop reason: HOSPADM

## 2021-01-21 RX ORDER — PROMETHAZINE HYDROCHLORIDE 25 MG/1
12.5 TABLET ORAL EVERY 6 HOURS PRN
Status: DISCONTINUED | OUTPATIENT
Start: 2021-01-21 | End: 2021-01-23 | Stop reason: HOSPADM

## 2021-01-21 RX ORDER — FENTANYL CITRATE 50 UG/ML
50 INJECTION, SOLUTION INTRAMUSCULAR; INTRAVENOUS ONCE
Status: COMPLETED | OUTPATIENT
Start: 2021-01-21 | End: 2021-01-21

## 2021-01-21 RX ORDER — HEPARIN SODIUM 1000 [USP'U]/ML
4000 INJECTION, SOLUTION INTRAVENOUS; SUBCUTANEOUS PRN
Status: DISCONTINUED | OUTPATIENT
Start: 2021-01-21 | End: 2021-01-21

## 2021-01-21 RX ORDER — HEPARIN SODIUM 1000 [USP'U]/ML
2000 INJECTION, SOLUTION INTRAVENOUS; SUBCUTANEOUS PRN
Status: DISCONTINUED | OUTPATIENT
Start: 2021-01-21 | End: 2021-01-21

## 2021-01-21 RX ORDER — ACETAMINOPHEN 650 MG/1
650 SUPPOSITORY RECTAL EVERY 6 HOURS PRN
Status: DISCONTINUED | OUTPATIENT
Start: 2021-01-21 | End: 2021-01-23 | Stop reason: HOSPADM

## 2021-01-21 RX ORDER — SODIUM CHLORIDE 0.9 % (FLUSH) 0.9 %
10 SYRINGE (ML) INJECTION PRN
Status: DISCONTINUED | OUTPATIENT
Start: 2021-01-21 | End: 2021-01-21

## 2021-01-21 RX ORDER — 0.9 % SODIUM CHLORIDE 0.9 %
500 INTRAVENOUS SOLUTION INTRAVENOUS ONCE
Status: COMPLETED | OUTPATIENT
Start: 2021-01-21 | End: 2021-01-21

## 2021-01-21 RX ORDER — MELOXICAM 15 MG/1
15 TABLET ORAL DAILY
Status: DISCONTINUED | OUTPATIENT
Start: 2021-01-21 | End: 2021-01-23 | Stop reason: HOSPADM

## 2021-01-21 RX ORDER — DEXTROSE MONOHYDRATE 25 G/50ML
12.5 INJECTION, SOLUTION INTRAVENOUS PRN
Status: DISCONTINUED | OUTPATIENT
Start: 2021-01-21 | End: 2021-01-23 | Stop reason: HOSPADM

## 2021-01-21 RX ORDER — SODIUM CHLORIDE 0.9 % (FLUSH) 0.9 %
10 SYRINGE (ML) INJECTION EVERY 12 HOURS SCHEDULED
Status: DISCONTINUED | OUTPATIENT
Start: 2021-01-21 | End: 2021-01-21

## 2021-01-21 RX ORDER — NICOTINE POLACRILEX 4 MG
15 LOZENGE BUCCAL PRN
Status: DISCONTINUED | OUTPATIENT
Start: 2021-01-21 | End: 2021-01-23 | Stop reason: HOSPADM

## 2021-01-21 RX ORDER — FAMOTIDINE 20 MG/1
20 TABLET, FILM COATED ORAL 2 TIMES DAILY
Status: DISCONTINUED | OUTPATIENT
Start: 2021-01-21 | End: 2021-01-23 | Stop reason: HOSPADM

## 2021-01-21 RX ORDER — HYDROCHLOROTHIAZIDE 25 MG/1
25 TABLET ORAL DAILY
Status: DISCONTINUED | OUTPATIENT
Start: 2021-01-21 | End: 2021-01-23 | Stop reason: HOSPADM

## 2021-01-21 RX ORDER — LISINOPRIL AND HYDROCHLOROTHIAZIDE 25; 20 MG/1; MG/1
1 TABLET ORAL DAILY
Status: DISCONTINUED | OUTPATIENT
Start: 2021-01-21 | End: 2021-01-21

## 2021-01-21 RX ORDER — ACETAMINOPHEN 325 MG/1
650 TABLET ORAL EVERY 4 HOURS PRN
Status: DISCONTINUED | OUTPATIENT
Start: 2021-01-21 | End: 2021-01-23 | Stop reason: HOSPADM

## 2021-01-21 RX ORDER — ASPIRIN 81 MG/1
81 TABLET, CHEWABLE ORAL DAILY
Status: DISCONTINUED | OUTPATIENT
Start: 2021-01-22 | End: 2021-01-23 | Stop reason: HOSPADM

## 2021-01-21 RX ORDER — ASPIRIN 81 MG/1
81 TABLET ORAL DAILY
Status: DISCONTINUED | OUTPATIENT
Start: 2021-01-21 | End: 2021-01-21

## 2021-01-21 RX ORDER — ONDANSETRON 2 MG/ML
4 INJECTION INTRAMUSCULAR; INTRAVENOUS ONCE
Status: COMPLETED | OUTPATIENT
Start: 2021-01-21 | End: 2021-01-21

## 2021-01-21 RX ORDER — SODIUM CHLORIDE 0.9 % (FLUSH) 0.9 %
10 SYRINGE (ML) INJECTION EVERY 12 HOURS SCHEDULED
Status: DISCONTINUED | OUTPATIENT
Start: 2021-01-21 | End: 2021-01-23 | Stop reason: HOSPADM

## 2021-01-21 RX ORDER — SODIUM CHLORIDE 0.9 % (FLUSH) 0.9 %
10 SYRINGE (ML) INJECTION PRN
Status: DISCONTINUED | OUTPATIENT
Start: 2021-01-21 | End: 2021-01-23 | Stop reason: HOSPADM

## 2021-01-21 RX ORDER — DEXTROSE MONOHYDRATE 50 MG/ML
100 INJECTION, SOLUTION INTRAVENOUS PRN
Status: DISCONTINUED | OUTPATIENT
Start: 2021-01-21 | End: 2021-01-23 | Stop reason: HOSPADM

## 2021-01-21 RX ADMIN — Medication 1000 ML: at 06:33

## 2021-01-21 RX ADMIN — ONDANSETRON 4 MG: 2 INJECTION INTRAMUSCULAR; INTRAVENOUS at 05:36

## 2021-01-21 RX ADMIN — METOPROLOL TARTRATE 12.5 MG: 25 TABLET, FILM COATED ORAL at 20:26

## 2021-01-21 RX ADMIN — FENTANYL CITRATE 50 MCG: 50 INJECTION, SOLUTION INTRAMUSCULAR; INTRAVENOUS at 06:34

## 2021-01-21 RX ADMIN — FAMOTIDINE 20 MG: 20 TABLET ORAL at 10:33

## 2021-01-21 RX ADMIN — METOPROLOL TARTRATE 12.5 MG: 25 TABLET, FILM COATED ORAL at 10:33

## 2021-01-21 RX ADMIN — TICAGRELOR 90 MG: 90 TABLET ORAL at 20:26

## 2021-01-21 RX ADMIN — TICAGRELOR 90 MG: 90 TABLET ORAL at 10:32

## 2021-01-21 RX ADMIN — MIDAZOLAM HYDROCHLORIDE 8 MG: 5 INJECTION INTRAMUSCULAR; INTRAVENOUS at 06:29

## 2021-01-21 RX ADMIN — FENTANYL CITRATE 25 MCG: 50 INJECTION, SOLUTION INTRAMUSCULAR; INTRAVENOUS at 06:46

## 2021-01-21 RX ADMIN — SODIUM CHLORIDE, PRESERVATIVE FREE 10 ML: 5 INJECTION INTRAVENOUS at 10:32

## 2021-01-21 RX ADMIN — ENOXAPARIN SODIUM 40 MG: 40 INJECTION SUBCUTANEOUS at 10:34

## 2021-01-21 RX ADMIN — MIDAZOLAM HYDROCHLORIDE 1 MG: 5 INJECTION INTRAMUSCULAR; INTRAVENOUS at 06:46

## 2021-01-21 RX ADMIN — ACETAMINOPHEN 650 MG: 325 TABLET ORAL at 20:29

## 2021-01-21 RX ADMIN — HYDROCHLOROTHIAZIDE 25 MG: 25 TABLET ORAL at 10:33

## 2021-01-21 RX ADMIN — HEPARIN SODIUM 7000 UNITS: 1000 INJECTION, SOLUTION INTRAVENOUS; SUBCUTANEOUS at 06:32

## 2021-01-21 RX ADMIN — LISINOPRIL 20 MG: 20 TABLET ORAL at 10:33

## 2021-01-21 RX ADMIN — FAMOTIDINE 20 MG: 20 TABLET ORAL at 20:26

## 2021-01-21 RX ADMIN — MELOXICAM 15 MG: 15 TABLET ORAL at 10:37

## 2021-01-21 RX ADMIN — TIROFIBAN 0.15 MCG/KG/MIN: 5 INJECTION, SOLUTION INTRAVENOUS at 08:28

## 2021-01-21 RX ADMIN — ATORVASTATIN CALCIUM 80 MG: 80 TABLET, FILM COATED ORAL at 20:26

## 2021-01-21 RX ADMIN — TICAGRELOR 180 MG: 90 TABLET ORAL at 05:30

## 2021-01-21 RX ADMIN — SODIUM CHLORIDE 500 ML: 9 INJECTION, SOLUTION INTRAVENOUS at 05:37

## 2021-01-21 RX ADMIN — FENTANYL CITRATE 50 MCG: 50 INJECTION, SOLUTION INTRAMUSCULAR; INTRAVENOUS at 05:32

## 2021-01-21 RX ADMIN — FENTANYL CITRATE 25 MCG: 50 INJECTION, SOLUTION INTRAMUSCULAR; INTRAVENOUS at 07:02

## 2021-01-21 RX ADMIN — MIDAZOLAM HYDROCHLORIDE 1 MG: 5 INJECTION INTRAMUSCULAR; INTRAVENOUS at 07:02

## 2021-01-21 RX ADMIN — POTASSIUM CHLORIDE 40 MEQ: 20 TABLET, EXTENDED RELEASE ORAL at 20:26

## 2021-01-21 RX ADMIN — MUPIROCIN: 20 OINTMENT TOPICAL at 20:26

## 2021-01-21 RX ADMIN — SODIUM CHLORIDE, PRESERVATIVE FREE 10 ML: 5 INJECTION INTRAVENOUS at 21:30

## 2021-01-21 RX ADMIN — HEPARIN SODIUM 2000 UNITS: 1000 INJECTION, SOLUTION INTRAVENOUS; SUBCUTANEOUS at 06:34

## 2021-01-21 RX ADMIN — FENTANYL CITRATE 250 MCG: 50 INJECTION, SOLUTION INTRAMUSCULAR; INTRAVENOUS at 06:21

## 2021-01-21 ASSESSMENT — PAIN DESCRIPTION - LOCATION
LOCATION: CHEST
LOCATION: BACK;NECK

## 2021-01-21 ASSESSMENT — PAIN SCALES - GENERAL
PAINLEVEL_OUTOF10: 0
PAINLEVEL_OUTOF10: 2
PAINLEVEL_OUTOF10: 0
PAINLEVEL_OUTOF10: 0
PAINLEVEL_OUTOF10: 1

## 2021-01-21 ASSESSMENT — PAIN DESCRIPTION - PAIN TYPE: TYPE: ACUTE PAIN

## 2021-01-21 ASSESSMENT — PAIN DESCRIPTION - FREQUENCY: FREQUENCY: CONTINUOUS

## 2021-01-21 ASSESSMENT — PAIN DESCRIPTION - PROGRESSION: CLINICAL_PROGRESSION: NOT CHANGED

## 2021-01-21 ASSESSMENT — PAIN DESCRIPTION - ORIENTATION: ORIENTATION: LEFT;MID

## 2021-01-21 ASSESSMENT — PAIN DESCRIPTION - ONSET: ONSET: ON-GOING

## 2021-01-21 NOTE — ED NOTES
0298 [facility] Cardiology prior to pt arrival in the ED per Dr. River Novoa to discuss EKG sent by EMS squad. 2420- Clinical called to make cath lab aware.    8573- Dr. Juliene Million called back to speak to Dr. Lizbeth Escalante Lemu  01/21/21 0542       Brigette Lem  01/21/21 9660

## 2021-01-21 NOTE — PRE SEDATION
Brief Pre-Op Note/Sedation Assessment      Ubaldo Appiah  1950  Cath Pool Rm/NONE      [id number]  7:12 AM    Planned Procedure: Cardiac Catheterization Procedure    Post Procedure Plan: Return to same level of care    Consent: I have discussed with the patient and/or the patient representative the indication, alternatives, and the possible risks and/or complications of the planned procedure and the anesthesia methods. The patient and/or patient representative appear to understand and agree to proceed.     Chief Complaint: STEMI      Indications for Cath Procedure:  ACS <= 24 hrs  Anginal Classification within 2 weeks:  CCS IV - Inability to perform any activity without angina or angina at rest, i.e., severe limitation  NYHA Heart Failure Class within 2 weeks: No symptoms  Is Cath Lab Visit Valve-related?: No  Surgical Risk: Intermediate  Functional Type: >= 4 METS with symptoms    Anti- Anginal Meds within 2 weeks:   Yes: Aspirin, Non-Statin (Any) and Statin (Any)    Stress or Imaging Studies Performed:  None     Vital Signs:  /81   Pulse 71   Temp 97.9 °F (36.6 °C) (Oral)   Resp 16   Ht 6' 2\" (1.88 m)   Wt 270 lb (122.5 kg)   SpO2 98%   BMI 34.67 kg/m²     Allergies:  No Known Allergies    Past Medical History:  Past Medical History:   Diagnosis Date    Carotid stenosis, non-symptomatic 6/3/2015    Hyperlipidemia     Hypertension     Obesity 5/17/2018         Surgical History:  Past Surgical History:   Procedure Laterality Date    APPENDECTOMY      CARDIAC SURGERY  05/2018    ablation     DENTAL SURGERY           Medications:  Current Facility-Administered Medications   Medication Dose Route Frequency Provider Last Rate Last Admin    heparin (porcine) injection 4,000 Units  4,000 Units Intravenous Once Juan Pablo Seals V, DO        heparin (porcine) injection 2,000 Units  2,000 Units Intravenous PRN Juan Pablo Seals V, DO        heparin (porcine) injection 4,000 Units  4,000 Units Intravenous PRN Juan Pablo Seals V, DO        heparin 25,000 unit in sodium chloride 0.45% 250 mL (premix) infusion  1,000 Units/hr Intravenous Continuous Imagene Neigh, DO               Pre-Sedation:    Pre-Sedation Documentation and Exam:  I have personally completed a history, physical exam & review of systems for this patient (see notes). Prior History of Anesthesia Complications:   none    Modified Mallampati:  II (soft palate, uvula, fauces visible)    ASA Classification:  E Status - the procedure is performed on an Emergency basis      Patricia Scale: Activity:  2 - Able to move 4 extremities voluntarily on command  Respiration:  2 - Able to breathe deeply and cough freely  Circulation:  2 - BP+/- 20mmHg of normal  Consciousness:  2 - Fully awake  Oxygen Saturation (color):  2 - Able to maintain oxygen saturation >92% on room air    Sedation/Anesthesia Plan:  Guard the patient's safety and welfare. Minimize physical discomfort and pain. Minimize negative psychological responses to treatment by providing sedation and analgesia and maximize the potential amnesia. Patient to meet pre-procedure discharge plan. Medication Planned:  midazolam intravenously and fentanyl intravenously    Patient is an appropriate candidate for plan of sedation: yes    The risks, benefits, goals, and alternatives of the procedure were discussed in detail with the patient. Informed consent was obtained and further recommendations will be made following the procedure. Matthew Shannon DO, [facility]  Interventional Cardiology     o: [phone]  [facility] [employer] [facility] [address], [address]      NOTE:  This report was transcribed using voice recognition software. Every effort was made to ensure accuracy; however, inadvertent computerized transcription errors may be present.

## 2021-01-21 NOTE — ED PROVIDER NOTES
CHIEF COMPLAINT  Chest Pain (patient reports chest pain that started at 1 am, reports that pain got worse. Reports 10 on 1-10 scale \"discomfort\")      HISTORY OF PRESENT ILLNESS  Shashank Quijano is a 78 y.o. male who presents to the ED with report of chest pain that started at 1 AM and the pain is getting increasingly worse. Reports pain on 10 out of 10 and just as a central chest discomfort. Does have a history of hyperlipidemia and hypertension and he does take lisinopril. There is a family history of heart disease. He also reported nausea. EMS did give aspirin and Zofran and nitro in route. Patient also reports he is diaphoretic as well. .   No other complaints, modifying factors or associated symptoms. I have reviewed the following from the nursing documentation.     Past Medical History:   Diagnosis Date    Carotid stenosis, non-symptomatic 6/3/2015    Hyperlipidemia     Hypertension     Obesity 5/17/2018     Past Surgical History:   Procedure Laterality Date    APPENDECTOMY      CARDIAC SURGERY  05/2018    ablation     DENTAL SURGERY       Family History   Problem Relation Age of Onset    Heart Disease Brother         AAA rupture     Social History     Socioeconomic History    Marital status:      Spouse name: Not on file    Number of children: Not on file    Years of education: Not on file    Highest education level: Not on file   Occupational History    Not on file   Social Needs    Financial resource strain: Not on file    Food insecurity     Worry: Not on file     Inability: Not on file    Transportation needs     Medical: Not on file     Non-medical: Not on file   Tobacco Use    Smoking status: Never Smoker    Smokeless tobacco: Never Used   Substance and Sexual Activity    Alcohol use: No    Drug use: No    Sexual activity: Not on file   Lifestyle    Physical activity     Days per week: Not on file     Minutes per session: Not on file    Stress: Not on file Relationships    Social connections     Talks on phone: Not on file     Gets together: Not on file     Attends [employer] service: Not on file     Active member of club or organization: Not on file     Attends meetings of clubs or organizations: Not on file     Relationship status: Not on file    Intimate partner violence     Fear of current or ex partner: Not on file     Emotionally abused: Not on file     Physically abused: Not on file     Forced sexual activity: Not on file   Other Topics Concern    Not on file   Social History Narrative    Not on file     Current Facility-Administered Medications   Medication Dose Route Frequency Provider Last Rate Last Admin    0.9 % sodium chloride bolus  500 mL Intravenous Once Khushbu Coffee V, DO        fentaNYL (SUBLIMAZE) injection 50 mcg  50 mcg Intravenous Once Khushbu Coffee V, DO        heparin (porcine) injection 4,000 Units  4,000 Units Intravenous Once Khushbu Coffee V, DO        heparin (porcine) injection 2,000 Units  2,000 Units Intravenous PRN Khushbu Coffee V, DO        heparin (porcine) injection 4,000 Units  4,000 Units Intravenous PRN Roselind Adamson, DO        heparin 25,000 unit in sodium chloride 0.45% 250 mL (premix) infusion  1,000 Units/hr Intravenous Continuous Roselind Adamson, DO        ticagrelor (BRILINTA) tablet 180 mg  180 mg Oral Once Roselind Adamson, DO         Current Outpatient Medications   Medication Sig Dispense Refill    atorvastatin (LIPITOR) 10 MG tablet TAKE ONE TABLET BY MOUTH DAILY 90 tablet 3    lisinopril-hydroCHLOROthiazide (PRINZIDE;ZESTORETIC) 20-25 MG per tablet TAKE ONE TABLET BY MOUTH DAILY 90 tablet 3    meloxicam (MOBIC) 15 MG tablet Take 1 tablet by mouth daily 30 tablet 0     No Known Allergies    REVIEW OF SYSTEMS  10 systems reviewed, pertinent positives per HPI otherwise noted to be negative.     PHYSICAL EXAM  /81   Pulse 71   Resp 16   Ht 6' 2\" (1.88 m)   Wt 270 lb (122.5 kg)   SpO2 98%   BMI 34.67 kg/m²   GENERAL APPEARANCE: Awake and alert. Cooperative. Appears in pain. HEAD: Normocephalic. Atraumatic. EYES: PERRL. EOM's grossly intact. ENT: Mucous membranes are moist.   NECK: Supple. HEART: RRR. CHEST/LUNGS: Chest atraumatic, nontender, respirations unlabored. CTAB. Good air exchange. Speaking comfortably in full sentences. BACK: No midline spinal tenderness or step-off. ABDOMEN: Soft. Non-distended. Non-tender. No guarding or rebound. Normal bowel sounds. EXTREMITIES: No peripheral edema. Moves all extremities equally. All extremities neurovascularly intact. SKIN: Warm and dry. No acute rashes. NEUROLOGICAL: Alert and oriented. CN 2-12 intact, No gross facial drooping. Strength 5/5, sensation intact. Normal coordination. Gait normal.   PSYCHIATRIC: Normal mood and affect. LABS  I have reviewed all labs for this visit. No results found for this visit on 01/21/21. EKG  EKG interpreted by me. ST elevation in lead II, III and aVF with reciprocal changes in 1 and aVL ST depressions noted in anterior lateral leads. No significant ST elevation or depression. RADIOLOGY  X-RAYS:  I have reviewed radiologic plain film image(s). ALL OTHER NON-PLAIN FILM IMAGES SUCH AS CT, ULTRASOUND AND MRI HAVE BEEN READ BY THE RADIOLOGIST. XR CHEST PORTABLE    (Results Pending)            PROCEDURES    ED COURSE/MDM  Patient seen and evaluated. Patient presents here with inferior anterior lateral STEMI. I did speak with Dr. Keren Denise of interventional cardiology, she is okay with heparin and would also like Brilinta. Patient was given aspirin and nitro and Zofran by EMS in route. He said chest pain started at 1:00. Will be taken to the Cath Lab. All diagnostic tests reviewed and results discussed with patient. Plan of care discussed with patient. Patient in agreement with plan. CLINICAL IMPRESSION  1.  Acute ST elevation myocardial infarction (STEMI) involving other coronary artery of inferior wall (HCC)        Height 6' 2\" (1.88 m), weight 270 lb (122.5 kg). DISPOSITION  Ubaldo Appiah was taken to [facility]. This chart was generated in part by using Dragon Dictation system and may contain errors related to that system including errors in grammar, punctuation, and spelling, as well as words and phrases that may be inappropriate. When dictating, effort is made to correct spelling/grammar errors. If there are any questions or concerns please feel free to contact the dictating provider for clarification.      Wilder Michael DO  ATTENDING, [id number] [facility], DO  01/21/21 6584

## 2021-01-21 NOTE — PROGRESS NOTES
Secure Message sent to MD Garza       [phone] [id number] or Facility: [facility] From: Josy Daugherty RE: Forrestine Money RM: [id number] Critical Result called. Pts Troponin is 1.37. Need Callback: NO CALLBACK REQ  Read 10:21 AM     1/21/21 1:21 PM   There is a Echo ordered on this patient. Since the patient is in R/O Covid isolation they will not complete the echo until tomorrow morning. Read 1:28 PM     1/21/21 1:22 PM   Is that ok? Read 1:28 PM     1/21/21 1:29 PM   Ok thank you for letting me know.      1/21/21 1:42 PM   Critical result of Troponin 2.40  Read 2:52 PM

## 2021-01-21 NOTE — PROGRESS NOTES
Secure Message sent to MD [facility]       [phone] [id number] or Facility: [facility] From: Mraycruz Mata RE: Dominique Jairoloni RM: [id number] Pt coughed up a moderate sized blood clot. Oxygenation did not change. Need Callback: NO CALLBACK REQ CCU  Read 9:08 AM     1/21/21 9:09 AM   Trying to call you. Please call [phone]    1/21/21 10:18 AM   Critical Result called. Pts Troponin is 1.37. He also coughed up another small clot. Read 10:19 AM     1/21/21 10:20 AM   Sorry on call with Transfer center. [id number] Gena Castañeda for troponin . . follow blood clots. Obtain CBC at 12 noon please. Thank you     1/21/21 10:23 AM   Ok Thank you! I will put the order in and send it to you! Read 10:23 AM     1/21/21 10:24 AM   ??    1/21/21 1:42 PM   Critical result of Troponin 2.40  Read 1:45 PM     1/21/21 1:45 PM   ??

## 2021-01-21 NOTE — ED NOTES
Bed: 03  Expected date:   Expected time:   Means of arrival:   Comments:  Gina Bhandari, formerly [facility]0 [facility]  01/21/21 9184

## 2021-01-21 NOTE — H&P
Hospital Medicine History & Physical      PCP: Amparo Manley DO    Date of Admission: 1/21/2021    Date of Service: Pt seen/examined on 1/21/2021 and Admitted to In    Chief Complaint:  Chest Pain      History Of Present Illness: The patient is a 78 y.o. male who presents to [facility] with complaints of chest pain. Patient reports having intermittent precordial chest pain. 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 He didn't think much of it, and stated that rest periods cleared his pain. Patient continued with his daily work and went to bed that evening without incidence. Patient abruptly woke up in the middle of the night with chest pressure that radiated into his jaw. Patient states pain was 8/10, associated with shortness of breath, and a \"doom\" feeling. Belching improved the pressure, but the pain did not resolve. Patient attempted positional changes and could not relieve his pain. At 3:30 this AM, his chest discomfort was unrelenting and crushing in nature. Patient then proceeded to the ED for further evaluation. In the ED, EKG demonstrated ST elevation to II, III, AVF with reciprocal changes to leads. Code STEMI activated and patient was taken to the Cath Lab for urgent intervention. Patient was found to have 100%RCA occlusion and underwent successful PCI X 2. Past Medical History:        Diagnosis Date    Carotid stenosis, non-symptomatic 6/3/2015    Hyperlipidemia     Hypertension     Obesity 5/17/2018       Past Surgical History:        Procedure Laterality Date    APPENDECTOMY      CARDIAC SURGERY  05/2018    ablation     DENTAL SURGERY         Medications Prior to Admission:    Prior to Admission medications    Medication Sig Start Date End Date Taking?  Authorizing Provider   atorvastatin (LIPITOR) 10 MG tablet TAKE ONE TABLET BY MOUTH DAILY 11/23/20  Yes Chau Prieto, DO   lisinopril-hydroCHLOROthiazide (PRINZIDE;ZESTORETIC) 20-25 MG per tablet TAKE ONE TABLET BY MOUTH DAILY 11/23/20  Yes Chau Prieto, DO   meloxicam (MOBIC) 15 MG tablet Take 1 tablet by mouth daily 1/23/20   NICOLÁS Shady Hollins MD       Allergies:  Patient has no known allergies. Social History:  The patient currently lives home    TOBACCO:   reports that he has never smoked. He has never used smokeless tobacco.  ETOH:   reports no history of alcohol use. DRUG USE:   Social History     Substance and Sexual Activity   Drug Use No       Family History:   Positive as follows:        Problem Relation Age of Onset    Heart Disease Brother         AAA rupture       REVIEW OF SYSTEMS:    All other systems reviewed and negative. PHYSICAL EXAM:    /71   Pulse 65   Temp 98.6 °F (37 °C) (Oral)   Resp 14   Ht 6' 2\" (1.88 m)   Wt 270 lb (122.5 kg)   SpO2 97%   BMI 34.67 kg/m²     General appearance: Well, NAD, appears stated age and cooperative. HEENT NCAT w/out obvious deformity. PERRL, EOM's intact. Conjunctivae/corneas clear  Neck: Supple, No JVD/Bruits. Trachea midline w/out thyromegaly or adenopathy w/ full ROM  Lungs: CTAB w/out Rales/Wheezes/Rhonchi w/ good respiratory effort. Decreased to bases bilat. Heart: RRR w/ Normal S1/S2 w/out  M/R/G, PMI non-displaced  R wrist site: CD&I. NO bleeding or bruising.  + 2 radial pulse. Vascular: no lower extremity peripheral edema and venous stasis, no calf tenderness, negative Laura's sign, no calf cords  Abdomen: soft, NT/ND w/out R/G and +BSx4. Extremities: No C/C/E Bilaterally. Full ROM w/ out deformity  Skin: Skin color, texture, turgor normal.  Neurologic: Alert and oriented X 3,  NVI w/ sensory/motor intact UE/LE Bilaterally.   Mental status: Alert, oriented, thought content appropriate  Cranial nerves:II-XII Grossly intact  Musculoskeletal:  no evidence of joint instability, strength normal, no evidence of muscle atrophy, no deformities present      CXR:  I have reviewed the CXR with the following interpretation:       Impression   Bibasilar atelectasis or, less likely, pneumonia. EKG:  I have reviewed the EKG with the following interpretation:     Normal sinus rhythmST elevation consider inferior injury or acute infarct* ACUTE MI *Consider right ventricular involvement in acute inferior infarctAbnormal ECGWhen compared with ECG of 28-MAY-2018 13:32,Significant changes have occurredConfirmed by Estevan Stern MD, [address] ([id number]) on 1/21/2021 7:38:22 AM    CBC   Recent Labs     01/21/21 0528 01/21/21  1159   WBC 8.8 9.9   HGB 14.1 13.4*   HCT 43.2 40.2*    192      RENAL  Recent Labs     01/21/21  0528      K 3.4*      CO2 28   BUN 29*   CREATININE 0.9     LFT'S  Recent Labs     01/21/21  0528   AST 17   ALT 33   BILITOT 0.4   ALKPHOS 53     COAG  Recent Labs     01/21/21  0528   INR 1.04     CARDIAC ENZYMES  Recent Labs     01/21/21  0528 01/21/21  0943 01/21/21  1159   TROPONINI <0.01 1.37* 2.40*       U/A:    Lab Results   Component Value Date    NITRITE neg 10/12/2018    COLORU yellow 10/12/2018    CLARITYU clear 10/12/2018    SPECGRAV 1.020 10/12/2018    LEUKOCYTESUR neg 10/12/2018    BLOODU neg 10/12/2018    GLUCOSEU neg 10/12/2018       ABG  No results found for: EGP2PVP, BEART, H1OHHWCH, PHART, THGBART, FVN6CXS, PO2ART, KLE5WQQ        Active Hospital Problems    Diagnosis Date Noted    STEMI involving right coronary artery ([facility] [address] [I21.11] 01/21/2021    STEMI (ST elevation myocardial infarction) ([facility] [address] [I21.3] 01/21/2021    Hypokalemia [E87.6] 01/21/2021    Typical atrial flutter ([facility] [address] [I48.3] 06/26/2018    Hyperlipidemia [E78.5] 05/28/2018    Morbidly obese ([facility] [address] [E66.01] 05/17/2018    Essential hypertension [I10] 04/06/2010       ASSESSMENT/PLAN:    Acute STEMI:  -Cardiology consultation: Code STEMI. Urgent cardiac intervention.  Appreciate recommendations  -Cardiac

## 2021-01-21 NOTE — OP NOTE
Cardiac Catheterization     Procedure: Emergent LHC, LVG, PCI-occluded mid RCA   Complications: None  Medications: Procedural sedation with minimal conscious sedation (11 Versed/350 Fentanyl)  An independent trained observer pushed medications at my direction. We monitored the patient's level of consciousness and vital signs/physiologic status throughout the procedure duration (see start and stop times in log). Estimated Blood Loss: Less than 20 mL  Specimens: were not obtained  Access: 6 Fr Right Radial  Closure: TR Band   Contrast:  290 cc  Start time: 6974  Stop time: 1730  Fluoro time: 19.9  Findings:     Left Heart Cath  Dominance: Right      LM: normal   LAD: 40% eccentric proximal disease  LCx: 30% proximal stenosis   RCA: 100% mid occlusion    LVEDP: 28 mmHg  LVEF: 50-55%    6 Fr JR 4 Guide with BMW wire and guideliner extension     3.5 x 12 mm predil (see log)    4.0 x 38 mm Xience FADIA extending to distal right   4.0 x 33 mm Xience FADIA overlapping previous stent proximally to mid vessel    5.0 x 20 mm NC Trek postdil (from 4.6 mm distally within stented segment to 4.8 proximally)    0% residual   Large RPDA and RPLB with ARLYN III Flow     Impression/Recommendations:  Successful PCI to mid RCA occlusion with two long overlapping drug eluting stents   ASA loaded in field/Ticagrelor loaded in lab: DAPT recommended for 12 months   Aggrastat gtt for 6 hours   Admit to CVU and obtain echo  Initiate beta blocekr and statin.      Matthew Shannon DO, [facility]  Interventional Cardiology     o: [phone]  [facility] [place] [address], [address] [facility]

## 2021-01-21 NOTE — H&P
Cardiovascular History & Physical     PATIENT: Chevy Jimenez  : 1950  MRN: [id number]    Chief complaint:   Chest pain    History of present illness:   Mr. Chevy Jimenez is a 78 y.o. male patient, follows with EP for history of atrial flutter ablation in 2018 with Dr. Evangelina Lemos, who presented as OOH STEMI following chest pain that woke him up at 1 am. Sharri Alvarez denies history of cardiac catheterization and states he was taken off of Aspirin at last Cardiology visit. He states he was in usual health until 6 pm last night. He experienced new onset throat pain and decided to eat dinner and go to bed. Denies palpitations, lightheadedness, N/V, syncope, shortness of breath, PND, orthopnea, or LE edema. Of note, patient's son has confirmed COVID19 infection and Sharri Alvarez admits to visiting him within the past week.      Medical History:      Diagnosis Date    Carotid stenosis, non-symptomatic 6/3/2015    Hyperlipidemia     Hypertension     Obesity 2018       Surgical History:      Procedure Laterality Date    APPENDECTOMY      CARDIAC SURGERY  2018    ablation     DENTAL SURGERY         Social History:  Social History     Socioeconomic History    Marital status:      Spouse name: Not on file    Number of children: Not on file    Years of education: Not on file    Highest education level: Not on file   Occupational History    Not on file   Social Needs    Financial resource strain: Not on file    Food insecurity     Worry: Not on file     Inability: Not on file   [employer] needs     Medical: Not on file     Non-medical: Not on file   Tobacco Use    Smoking status: Never Smoker    Smokeless tobacco: Never Used   Substance and Sexual Activity    Alcohol use: No    Drug use: No    Sexual activity: Not on file   Lifestyle    Physical activity     Days per week: Not on file     Minutes per session: Not on file    Stress: Not on file   Relationships  Social connections     Talks on phone: Not on file     Gets together: Not on file     Attends [employer] service: Not on file     Active member of club or organization: Not on file     Attends meetings of clubs or organizations: Not on file     Relationship status: Not on file    Intimate partner violence     Fear of current or ex partner: Not on file     Emotionally abused: Not on file     Physically abused: Not on file     Forced sexual activity: Not on file   Other Topics Concern    Not on file   Social History Narrative    Not on file        Family History:  No evidence for sudden cardiac death or premature CAD. Problem Relation Age of Onset    Heart Disease Brother         AAA rupture       Medications:  Prior to Admission medications    Medication Sig Start Date End Date Taking? Authorizing Provider   atorvastatin (LIPITOR) 10 MG tablet TAKE ONE TABLET BY MOUTH DAILY 11/23/20  Yes Chau Prieto, DO   lisinopril-hydroCHLOROthiazide (PRINZIDE;ZESTORETIC) 20-25 MG per tablet TAKE ONE TABLET BY MOUTH DAILY 11/23/20  Yes Chau Prieto, DO   meloxicam (MOBIC) 15 MG tablet Take 1 tablet by mouth daily 1/23/20   F Garcia Allen MD       Allergies:  Patient has no known allergies.      Review of Systems:   [x]Full ROS obtained and negative except as mentioned in HPI    Physical Examination:    /81   Pulse 71   Temp 97.9 °F (36.6 °C) (Oral)   Resp 16   Ht 6' 2\" (1.88 m)   Wt 270 lb (122.5 kg)   SpO2 98%   BMI 34.67 kg/m²   Wt Readings from Last 3 Encounters:   01/21/21 270 lb (122.5 kg)   11/23/20 279 lb 6.4 oz (126.7 kg)   08/27/20 276 lb 0.3 oz (125.2 kg)       GENERAL: Well developed, well nourished,In acute distress  NEUROLOGICAL: Alert and oriented x3  PSYCH: Normal mood and affect   SKIN: Warm and dry, without lesions  HEENT: Normocephalic, atraumatic, Sclera non-icteric, mucous membranes moist  NECK: supple, JVP normal, thyroid not enlarged   CAROTID: Normal upstroke, no bruits  CARDIAC: Normal PMI, regular rate and rhythm, normal S1S2, no murmur, rub  RESPIRATORY: Normal respiratory effort, clear to auscultation bilaterally  EXTREMITIES: No cyanosis, clubbing or edema, palpable pulses bilaterally   MUSCULOSKELETAL: No joint swelling or tenderness, no chest wall tenderness  GASTROINTESTINAL:  soft, non-tender, no bruit    Labs:  Lab Review   Lab Results   Component Value Date     2021    K 3.4 2021     2021    CO2 28 2021    BUN 29 2021    CREATININE 0.9 2021    GLUCOSE 203 2021    CALCIUM 9.5 2021     Lab Results   Component Value Date    TROPONINI <0.01 2021     Lab Results   Component Value Date    WBC 8.8 2021    HGB 14.1 2021    HCT 43.2 2021    MCV 90.7 2021     2021     Lab Results   Component Value Date    CHOL 159 2020    TRIG 101 2020    HDL 38 2020    HDL 43 2011         Imaging:  I have reviewed the below testing personally:    EK/21/20  Normal sinus rhythm  ST elevation consider inferior injury or acute infarct  Consider right ventricular involvement in acute inferior infarctAbnormal      Echo 2018:   Normal left ventricular systolic function with ejection fraction of 55-60%.   No regional wall motion abnormalites are seen.   Mild concentric left ventricular hypertrophy is present.   Left ventricular diastolic filling pressure is normal.   Mild mitral regurgitation.   Underlying atrial flutter with variable conduction noted. Impression/Recommendations    Mr. Chevy Jimenez is a 78 y.o. male patient    Acute Inferior STEMI  Hx.  AFlutter ablation 2018  Hypertension  Hyperlipidemia  Obesity  COVID19 Rule Out     DAPT and Heparin load given preprocedure    Risks, benefits, goals, and alternatives of left heart catheterization with the potential for percutaneous coronary intervention discussed with patient; including stroke, heart attack, kidney damage, death, paralysis, disability, damage to nerves/arteries/veins. All questions answered and informed consent obtained. Further recommendations pending coronary angiography and clinical course. Critical care time spent in direct management of this patient was 40 minutes, exclusive of separately documented procedures. Time spent includes but was not limited to directly managing the unstable patient, reviewing diagnostics, speaking with medical staff, and developing a treatment plan.       Vanessa Ellison D.O., [address] [facility]  Interventional Cardiology     o: [phone]  [address] [place] [address]., [address], [address] [facility]

## 2021-01-21 NOTE — CONSULTS
Pharmacy to Manage Heparin Infusion per [facility]    Dx: Chest Pain  Pt wt = 98.3 kg adj. Baseline aPTT = pending. Low Dose Heparin Infusion  Heparin 4000 units IVP bolus followed by Heparin infusion at 10 ml/hr. Recheck aPTT in 6 hours. Goal aPTT = 49-76 seconds.   Dom Campos Pharm D.1/21/2021 5:41 AM

## 2021-01-21 NOTE — ED NOTES
Patient given 5000 unit bolus by AJ, cath lab RN prior to departing for Cath Lab.       [facility]  01/21/21 9842

## 2021-01-22 LAB
ALBUMIN SERPL-MCNC: 3.6 G/DL (ref 3.4–5)
ALP BLD-CCNC: 50 U/L (ref 40–129)
ALT SERPL-CCNC: 46 U/L (ref 10–40)
ANION GAP SERPL CALCULATED.3IONS-SCNC: 6 MMOL/L (ref 3–16)
AST SERPL-CCNC: 103 U/L (ref 15–37)
BILIRUB SERPL-MCNC: 0.7 MG/DL (ref 0–1)
BILIRUBIN DIRECT: <0.2 MG/DL (ref 0–0.3)
BILIRUBIN, INDIRECT: ABNORMAL MG/DL (ref 0–1)
BUN BLDV-MCNC: 24 MG/DL (ref 7–20)
CALCIUM SERPL-MCNC: 9.1 MG/DL (ref 8.3–10.6)
CHLORIDE BLD-SCNC: 104 MMOL/L (ref 99–110)
CHOLESTEROL, TOTAL: 130 MG/DL (ref 0–199)
CO2: 31 MMOL/L (ref 21–32)
CREAT SERPL-MCNC: 0.8 MG/DL (ref 0.8–1.3)
EKG ATRIAL RATE: 69 BPM
EKG DIAGNOSIS: NORMAL
EKG P AXIS: 26 DEGREES
EKG P-R INTERVAL: 158 MS
EKG Q-T INTERVAL: 430 MS
EKG QRS DURATION: 88 MS
EKG QTC CALCULATION (BAZETT): 460 MS
EKG R AXIS: -11 DEGREES
EKG T AXIS: -40 DEGREES
EKG VENTRICULAR RATE: 69 BPM
ESTIMATED AVERAGE GLUCOSE: 134.1 MG/DL
GFR AFRICAN AMERICAN: >60
GFR NON-AFRICAN AMERICAN: >60
GLUCOSE BLD-MCNC: 104 MG/DL (ref 70–99)
GLUCOSE BLD-MCNC: 115 MG/DL (ref 70–99)
GLUCOSE BLD-MCNC: 118 MG/DL (ref 70–99)
GLUCOSE BLD-MCNC: 129 MG/DL (ref 70–99)
GLUCOSE BLD-MCNC: 96 MG/DL (ref 70–99)
HBA1C MFR BLD: 6.3 %
HCT VFR BLD CALC: 39.1 % (ref 40.5–52.5)
HDLC SERPL-MCNC: 31 MG/DL (ref 40–60)
HEMOGLOBIN: 12.9 G/DL (ref 13.5–17.5)
LDL CHOLESTEROL CALCULATED: 70 MG/DL
LV EF: 50 %
LVEF MODALITY: NORMAL
MCH RBC QN AUTO: 29.7 PG (ref 26–34)
MCHC RBC AUTO-ENTMCNC: 32.9 G/DL (ref 31–36)
MCV RBC AUTO: 90.3 FL (ref 80–100)
PDW BLD-RTO: 13.9 % (ref 12.4–15.4)
PERFORMED ON: ABNORMAL
PERFORMED ON: NORMAL
PLATELET # BLD: 181 K/UL (ref 135–450)
PMV BLD AUTO: 7.2 FL (ref 5–10.5)
POTASSIUM REFLEX MAGNESIUM: 3.7 MMOL/L (ref 3.5–5.1)
RBC # BLD: 4.32 M/UL (ref 4.2–5.9)
SARS-COV-2, PCR: NOT DETECTED
SODIUM BLD-SCNC: 141 MMOL/L (ref 136–145)
TOTAL PROTEIN: 6.1 G/DL (ref 6.4–8.2)
TRIGL SERPL-MCNC: 143 MG/DL (ref 0–150)
VLDLC SERPL CALC-MCNC: 29 MG/DL
WBC # BLD: 9.1 K/UL (ref 4–11)

## 2021-01-22 PROCEDURE — 85027 COMPLETE CBC AUTOMATED: CPT

## 2021-01-22 PROCEDURE — 80061 LIPID PANEL: CPT

## 2021-01-22 PROCEDURE — 6370000000 HC RX 637 (ALT 250 FOR IP): Performed by: INTERNAL MEDICINE

## 2021-01-22 PROCEDURE — 2580000003 HC RX 258: Performed by: INTERNAL MEDICINE

## 2021-01-22 PROCEDURE — 6360000002 HC RX W HCPCS: Performed by: INTERNAL MEDICINE

## 2021-01-22 PROCEDURE — 80048 BASIC METABOLIC PNL TOTAL CA: CPT

## 2021-01-22 PROCEDURE — 80076 HEPATIC FUNCTION PANEL: CPT

## 2021-01-22 PROCEDURE — 93005 ELECTROCARDIOGRAM TRACING: CPT | Performed by: INTERNAL MEDICINE

## 2021-01-22 PROCEDURE — C8929 TTE W OR WO FOL WCON,DOPPLER: HCPCS

## 2021-01-22 PROCEDURE — 99232 SBSQ HOSP IP/OBS MODERATE 35: CPT | Performed by: INTERNAL MEDICINE

## 2021-01-22 PROCEDURE — 93010 ELECTROCARDIOGRAM REPORT: CPT | Performed by: INTERNAL MEDICINE

## 2021-01-22 PROCEDURE — 1200000000 HC SEMI PRIVATE

## 2021-01-22 RX ADMIN — LISINOPRIL 20 MG: 20 TABLET ORAL at 09:04

## 2021-01-22 RX ADMIN — TICAGRELOR 90 MG: 90 TABLET ORAL at 21:01

## 2021-01-22 RX ADMIN — FAMOTIDINE 20 MG: 20 TABLET ORAL at 21:02

## 2021-01-22 RX ADMIN — ASPIRIN 81 MG: 81 TABLET, CHEWABLE ORAL at 09:04

## 2021-01-22 RX ADMIN — TICAGRELOR 90 MG: 90 TABLET ORAL at 09:04

## 2021-01-22 RX ADMIN — SODIUM CHLORIDE, PRESERVATIVE FREE 10 ML: 5 INJECTION INTRAVENOUS at 11:20

## 2021-01-22 RX ADMIN — ACETAMINOPHEN 650 MG: 325 TABLET ORAL at 11:33

## 2021-01-22 RX ADMIN — HYDROCHLOROTHIAZIDE 25 MG: 25 TABLET ORAL at 09:04

## 2021-01-22 RX ADMIN — METOPROLOL TARTRATE 25 MG: 25 TABLET, FILM COATED ORAL at 21:02

## 2021-01-22 RX ADMIN — SODIUM CHLORIDE, PRESERVATIVE FREE 10 ML: 5 INJECTION INTRAVENOUS at 21:01

## 2021-01-22 RX ADMIN — METOPROLOL TARTRATE 12.5 MG: 25 TABLET, FILM COATED ORAL at 09:03

## 2021-01-22 RX ADMIN — ENOXAPARIN SODIUM 40 MG: 40 INJECTION SUBCUTANEOUS at 09:03

## 2021-01-22 RX ADMIN — FAMOTIDINE 20 MG: 20 TABLET ORAL at 09:04

## 2021-01-22 RX ADMIN — MELOXICAM 15 MG: 15 TABLET ORAL at 09:04

## 2021-01-22 RX ADMIN — MUPIROCIN: 20 OINTMENT TOPICAL at 09:10

## 2021-01-22 RX ADMIN — ATORVASTATIN CALCIUM 80 MG: 80 TABLET, FILM COATED ORAL at 21:01

## 2021-01-22 ASSESSMENT — PAIN SCALES - GENERAL
PAINLEVEL_OUTOF10: 0

## 2021-01-22 ASSESSMENT — PAIN DESCRIPTION - ORIENTATION: ORIENTATION: MID

## 2021-01-22 NOTE — CARE COORDINATION
CASE MANAGEMENT INITIAL ASSESSMENT    Reviewed chart and completed assessment with: Patient     Explained Case Management role/services. Primary contact information: [address] Decision Maker :   Primary Decision Maker: Luis Alberto Cheek - Child - [phone]    Secondary Decision Maker: Prasad Burks - Child - [phone]          Can this person be reached and be able to respond quickly, such as within a few minutes or hours? Yes    Admit date/status: 01/21/2021 Inpatient   Diagnosis:STEMI (ST elevation myocardial infarction)  Is this a Readmission?:  No      Insurance:[employer]    Precert required for SNF: No- waived thru 01/31/2021       3 night stay required: No    Living arrangements, Adls, care needs, prior to [address], living at home alone, 3 steps to enter one level living once inside    Transportation: [address] at home:  Denies     Services in the home and/or outpatient, prior to admission: Denies report INS has been trying for years for [address]     PT/OT recs:not ordered     Hospital Exemption Notification (HEN): NA    Barriers to discharge: Denies     Plan/comments: Patient plans to return home when medically appropriate, denies needs at this time. MAY Mcdonald       ECOC on chart for MD signature

## 2021-01-22 NOTE — PROGRESS NOTES
[facility]   PROGRESS NOTE  [phone]      Attending Physician: Sixto Stewart MD  Reason for Consultation/Chief Complaint: Acute inferior STEMI    Subjective     Patient coughed up some small amounts of red blood overnight and earlier this morning. He says that his nose was bleeding after being swabbed for the COVID-19 PCR which returned negative. Denies recurrent angina or shortness of breath. CURRENT Medications:      sodium chloride flush 0.9 % injection 10 mL, 2 times per day      sodium chloride flush 0.9 % injection 10 mL, PRN      acetaminophen (TYLENOL) tablet 650 mg, Q4H PRN      ticagrelor (BRILINTA) tablet 90 mg, BID      atorvastatin (LIPITOR) tablet 80 mg, Nightly      metoprolol tartrate (LOPRESSOR) tablet 12.5 mg, BID      ondansetron (ZOFRAN) injection 4 mg, Q6H PRN      glucose (GLUTOSE) 40 % oral gel 15 g, PRN      dextrose 50 % IV solution, PRN      glucagon (rDNA) injection 1 mg, PRN      dextrose 5 % solution, PRN      insulin lispro (HUMALOG) injection vial 10 Units, TID WC      insulin lispro (HUMALOG) injection vial 0-12 Units, TID WC      insulin lispro (HUMALOG) injection vial 0-6 Units, Nightly      meloxicam (MOBIC) tablet 15 mg, Daily      promethazine (PHENERGAN) tablet 12.5 mg, Q6H PRN    Or      ondansetron (ZOFRAN) injection 4 mg, Q6H PRN      acetaminophen (TYLENOL) tablet 650 mg, Q6H PRN    Or      acetaminophen (TYLENOL) suppository 650 mg, Q6H PRN      polyethylene glycol (GLYCOLAX) packet 17 g, Daily PRN      aspirin chewable tablet 81 mg, Daily      famotidine (PEPCID) tablet 20 mg, BID      enoxaparin (LOVENOX) injection 40 mg, Daily      lisinopril (PRINIVIL;ZESTRIL) tablet 20 mg, Daily    And      hydroCHLOROthiazide (HYDRODIURIL) tablet 25 mg, Daily      mupirocin (BACTROBAN) 2 % ointment, BID        Allergies:  Patient has no known allergies. Review of Systems:   General: No chills or sweats.   Cardiac: No chest pain or palpitations. Respiratory: Positive for cough with bloody sputum. No shortness of breath. GI: No nausea, vomiting, or diarrhea. Neuro: No lightheadedness or dizziness. Objective   PHYSICAL EXAM:    Vitals:    01/22/21 0700   BP: 116/74   Pulse: 60   Resp:    Temp:    SpO2: 99%    Weight: 270 lb (122.5 kg)      General: Adult male resting in bed in no acute distress. Pleasant and interactive on exam.  HEENT: Normocephalic, atraumatic, non-icteric, hearing intact, nares normal, mucous membranes moist.  Neck: No JVD. Heart: Regular rate and rhythm. Normal S1 and S2. No murmurs, gallops or rubs. Lungs: Normal respiratory effort. Clear to auscultation bilaterally. No wheezes, rales, or rhonchi. Abdomen: Soft, non-tender. Normoactive bowel sounds. No masses or organomegaly. Skin: No rashes, wounds, or lesions. Pulses: 2+ and symmetric. Extremities: Right radial artery access site is soft, non-tender, and without evidence of bleeding or hematoma. Right radial artery pulse 2+. No clubbing, cyanosis, or edema. Psych: Normal mood and affect. Neuro: Alert and oriented to person, place, and time. No focal deficits noted.       Labs   CBC:   Lab Results   Component Value Date    WBC 9.1 01/22/2021    RBC 4.32 01/22/2021    HGB 12.9 01/22/2021    HCT 39.1 01/22/2021    MCV 90.3 01/22/2021    RDW 13.9 01/22/2021     01/22/2021     CMP:  Lab Results   Component Value Date     01/22/2021    K 3.7 01/22/2021     01/22/2021    CO2 31 01/22/2021    BUN 24 01/22/2021    CREATININE 0.8 01/22/2021    GFRAA >60 01/22/2021    GFRAA >60 09/18/2012    AGRATIO 1.5 01/21/2021    LABGLOM >60 01/22/2021    GLUCOSE 129 01/22/2021    PROT 6.1 01/22/2021    PROT 6.7 09/18/2012    CALCIUM 9.1 01/22/2021    BILITOT 0.7 01/22/2021    ALKPHOS 50 01/22/2021     01/22/2021    ALT 46 01/22/2021     PT/INR:  No results found for: PTINR  HgBA1c:  Lab Results   Component Value Date    LABA1C 6.3 01/21/2021     Lab Results   Component Value Date    TROPONINI 2.40 ([facility]) 01/21/2021         Cardiac Data     LHC: 1/21/21:  Left Heart Cath  Dominance: Right      LM: normal   LAD: 40% eccentric proximal disease  LCx: 30% proximal stenosis   RCA: 100% mid occlusion     LVEDP: 28 mmHg  LVEF: 50-55%     6 Fr JR 4 Guide with BMW wire and guideliner extension      3.5 x 12 mm predil (see log)     4.0 x 38 mm Xience FADIA extending to distal right   4.0 x 33 mm Xience FADIA overlapping previous stent proximally to mid vessel     5.0 x 20 mm NC Trek postdil (from 4.6 mm distally within stented segment to 4.8 proximally)     0% residual   Large RPDA and RPLB with ARLYN III Flow      Impression/Recommendations:  Successful PCI to mid RCA occlusion with two long overlapping drug eluting stents   ASA loaded in field/Ticagrelor loaded in lab: DAPT recommended for 12 months   Aggrastat gtt for 6 hours   Admit to CVU and obtain echo  Initiate beta blocekr and statin. TTE 1/21/21:  Conclusions   Summary   Technically difficult study due to poor acoustic windows, definity is used   for myocardial border enhancement. Left ventricular systolic function is low normal with a visually estimated   ejection fraction of 50%. Grade I diastolic dysfunction with normal filling pressure. Mild mitral and tricuspid regurgitation. Systolic pulmonary artery pressure (SPAP) is normal and estimated at 24 mmHg   (right atrial pressure 8 mmHg). Assessment:      1. CAD - S/p acute inferior STEMI and PCI to mid-distal RCA with overlapping Xience Zoie 4.0 x 38 mm and 4.0 x 33 mm FADIA, post-dilated with non-compliant 5.0 x 20 mm balloon. TTE today showed preserved LVEF with no gross wall motion abnormalities, although endocardial borders were suboptimally visualized. Denies recurrent angina. 2. Essential hypertension  3. Hyperlipidemia  4. Atrial flutter s/p RFCA in 2018, no longer on anticoagulation  5.  Paroxysmal atrial tachycardia  6. PVCs  6. Obesity  7. Cough productive of bloody sputum      Plan:      1. Transfer out of ICU and ambulate in hallway multiple times today. 2. Continue dual antiplatelet therapy with aspirin 81 mg daily and Brilinta 90 mg BID. 3. Continue atorvastatin 80 mg daily. 4. Will increase metoprolol tartrate to 25 mg BID. 5. Continue lisinopril 20 mg daily and hydrochlorothiazide 25 mg daily. 6. Continue to monitor on telemetry. 7. Suspect blood in sputum likely resulted from drainage of irritated nasal mucosa from COVID-19 swab. If does not resolve, will consider chest CT for further evaluation. 8. If remains stable over next 24 hours, will plan to discharge home tomorrow and arrange for follow-up with [facility] in 2 weeks. Thank you for allowing us to participate in the care of Martha Haddad. Please call me with any questions [phone]. [address]  [phone] [facility]  [phone] [address] [place] [address]  1/22/2021 8:54 AM    I will address the patient's cardiac risk factors and adjusted pharmacologic treatment as needed. In addition, I have reinforced the need for patient directed risk factor modification. All questions and concerns were addressed to the patient/family. Alternatives to my treatment were discussed. The note was completed using EMR. Every effort was made to ensure accuracy; however, inadvertent computerized transcription errors may be present.

## 2021-01-22 NOTE — PROGRESS NOTES
Secure Message sent with      1/22/21 11:10 AM   Please review ECG that was just completed. It states \"NSR, Inferior Infarct, possibly acute. ACUTE MI, Consider right ventricular involvement in acute inferior infarct\". Pt has no complaints of chest pain at this time. Read 11:45 AM     1/22/21 11:19 AM   I reviewed the ECG with other nurses on the unit and we do not see any evidence of a Acute MI. Read 11:45 AM     1/22/21 11:45 AM   Called your NP who stated it looks fine for the progression of his MI yesterday. Pt still with no complaints of chest pain at this time. Read 11:45 AM     1/22/21 11:47 AM   Ecg is improved from yesterday. Changes are as expected. Thanks. Danielle RUBIO RN

## 2021-01-22 NOTE — PROGRESS NOTES
Hospital Medicine History & Physical      PCP: Osito August DO    Date of Admission: 1/21/2021    Date of Service: Pt seen/examined on 1/21/2021 and Admitted to Inpt    Chief Complaint:  Chest Pain      History Of Present Illness: The patient is a 78 y.o. male who presents to [facility] with complaints of chest pain. Patient reports having intermittent precordial chest pain. 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 He didn't think much of it, and stated that rest periods cleared his pain. Patient continued with his daily work and went to bed that evening without incidence. Patient abruptly woke up in the middle of the night with chest pressure that radiated into his jaw. Patient states pain was 8/10, associated with shortness of breath, and a \"doom\" feeling. Belching improved the pressure, but the pain did not resolve. Patient attempted positional changes and could not relieve his pain. At 3:30 this AM, his chest discomfort was unrelenting and crushing in nature. Patient then proceeded to the ED for further evaluation. In the ED, EKG demonstrated ST elevation to II, III, AVF with reciprocal changes to leads. Code STEMI activated and patient was taken to the Cath Lab for urgent intervention. Patient was found to have 100%RCA occlusion and underwent successful PCI X 2.     SUBJECTIVE  Patient denies any further complaints of chest pain. Reports breathing at his baseline. Cardiac cath site without complication or bleeding. Bedside rounding with nursing completed  No new medical complaints    REVIEW OF SYSTEMS:    All other systems reviewed and negative.     PHYSICAL EXAM:    /66   Pulse 69   Temp 98.3 °F (36.8 °C) (Oral)   Resp 17   Ht 6' 2\" (1.88 m)   Wt 270 lb (122.5 kg)   SpO2 99%   BMI 34.67 kg/m²     General appearance: Well, NAD, appears stated age and cooperative. HEENT NCAT w/out obvious deformity. PERRL, EOM's intact. Conjunctivae/corneas clear  Neck: Supple, No JVD/Bruits. Trachea midline w/out thyromegaly or adenopathy w/ full ROM  Lungs: CTAB w/out Rales/Wheezes/Rhonchi w/ good respiratory effort. Decreased to bases bilat. Heart: RRR w/ Normal S1/S2 w/out  M/R/G, PMI non-displaced  R wrist site: CD&I. NO bleeding or bruising.  + 2 radial pulse. Vascular: no lower extremity peripheral edema and venous stasis, no calf tenderness, negative Laura's sign, no calf cords  Abdomen: soft, NT/ND w/out R/G and +BSx4. Extremities: No C/C/E Bilaterally. Full ROM w/ out deformity  Skin: Skin color, texture, turgor normal.  Neurologic: Alert and oriented X 3,  NVI w/ sensory/motor intact UE/LE Bilaterally. Mental status: Alert, oriented, thought content appropriate  Cranial nerves:II-XII Grossly intact  Musculoskeletal:  no evidence of joint instability, strength normal, no evidence of muscle atrophy, no deformities present      CXR:  I have reviewed the CXR with the following interpretation:       Impression   Bibasilar atelectasis or, less likely, pneumonia.        EKG:  I have reviewed the EKG with the following interpretation:     Normal sinus rhythmST elevation consider inferior injury or acute infarct* ACUTE MI *Consider right ventricular involvement in acute inferior infarctAbnormal ECGWhen compared with ECG of 28-MAY-2018 13:32,Significant changes have occurredConfirmed by Colt Calzada MD, [address] (1984) on 1/21/2021 7:38:22 AM    CBC   Recent Labs     01/21/21  0528 01/21/21  1159 01/22/21  0529   WBC 8.8 9.9 9.1   HGB 14.1 13.4* 12.9*   HCT 43.2 40.2* 39.1*    192 181      RENAL  Recent Labs     01/21/21  0528 01/22/21  0529    141   K 3.4* 3.7    104   CO2 28 31   BUN 29* 24*   CREATININE 0.9 0.8     LFT'S  Recent Labs     01/21/21  0528 01/22/21  0529   AST 17 103*   ALT 33 55*   BILIDIR  --  <0.2 BILITOT 0.4 0.7   ALKPHOS 53 50     COAG  Recent Labs     01/21/21  0528   INR 1.04     CARDIAC ENZYMES  Recent Labs     01/21/21  0528 01/21/21  0943 01/21/21  1159   TROPONINI <0.01 1.37* 2.40*       U/A:    Lab Results   Component Value Date    NITRITE neg 10/12/2018    COLORU yellow 10/12/2018    CLARITYU clear 10/12/2018    SPECGRAV 1.020 10/12/2018    LEUKOCYTESUR neg 10/12/2018    BLOODU neg 10/12/2018    GLUCOSEU neg 10/12/2018       ABG  No results found for: CTF3MWW, BEART, Y7SNDZQH, PHART, THGBART, DKD3JEN, PO2ART, MDQ8BSD        Active Hospital Problems    Diagnosis Date Noted    [facility] [I21.11] 01/21/2021    STEMI (ST elevation myocardial infarction) ([facility] [I21.3] 01/21/2021    Hypokalemia [E87.6] 01/21/2021    Typical atrial flutter ([facility] [I48.3] 06/26/2018    Hyperlipidemia [E78.5] 05/28/2018    Morbidly obese ([facility] [E66.01] 05/17/2018    Essential hypertension [I10] 04/06/2010       ASSESSMENT/PLAN:    Acute STEMI:  -Cardiology consultation: Code STEMI. Urgent cardiac intervention. Appreciate recommendations  -Cardiac catheterization: 100% RCA occlusion  -Medications: Aspirin, Brilinta, Lipitor, lisinopril, metoprolol  -Labs: Cholesterol testing  -Serial troponin  -EKG/telemetry monitoring    Essential hypertension and hyperlipidemia:  -Well-controlled on outpatient setting  -Medications: Lipitor, lisinopril, hydrochlorothiazide, metoprolol  -Continue to optimize blood pressure curves and cholesterol testing as outpatient    Atrial flutter status post ablation 2018:  -Stable heart rate  -No need for anticoagulation  -Continue to monitor and support    Morbid Obesity (Body mass index is 34.67 kg/m². ) - Complicating assessment and treatment. Placing patient at risk for multiple comorbidities as well as early death and contributing to the patients presentation. Counseled on weight loss.     FEN: Hypokalemia  -Replete orally  Labs: BMP monitoring-  -encourage nutrition        DVT Prophylaxis: Lovenox  Diet: DIET CARDIAC;  Code Status: Full Code  PT/OT Eval Status: No need identified    Dispo - Expect DC in AM 1/23 pending clinical response to medical therapy. Crystal Brumfield MD    Thank you José Palmer DO for the opportunity to be involved in this patient's care. If you have any questions or concerns please feel free to contact me at [phone].

## 2021-01-23 ENCOUNTER — APPOINTMENT (OUTPATIENT)
Dept: CT IMAGING | Age: 71
DRG: 247 | End: 2021-01-23
Payer: MEDICARE

## 2021-01-23 VITALS
HEIGHT: 74 IN | DIASTOLIC BLOOD PRESSURE: 74 MMHG | WEIGHT: 275.9 LBS | OXYGEN SATURATION: 97 % | BODY MASS INDEX: 35.41 KG/M2 | RESPIRATION RATE: 18 BRPM | SYSTOLIC BLOOD PRESSURE: 113 MMHG | TEMPERATURE: 98 F | HEART RATE: 76 BPM

## 2021-01-23 LAB
GLUCOSE BLD-MCNC: 105 MG/DL (ref 70–99)
GLUCOSE BLD-MCNC: 124 MG/DL (ref 70–99)
GLUCOSE BLD-MCNC: 90 MG/DL (ref 70–99)
PERFORMED ON: ABNORMAL
PERFORMED ON: ABNORMAL
PERFORMED ON: NORMAL

## 2021-01-23 PROCEDURE — 99233 SBSQ HOSP IP/OBS HIGH 50: CPT | Performed by: INTERNAL MEDICINE

## 2021-01-23 PROCEDURE — 6370000000 HC RX 637 (ALT 250 FOR IP): Performed by: INTERNAL MEDICINE

## 2021-01-23 PROCEDURE — 99222 1ST HOSP IP/OBS MODERATE 55: CPT | Performed by: INTERNAL MEDICINE

## 2021-01-23 PROCEDURE — 71250 CT THORAX DX C-: CPT

## 2021-01-23 RX ORDER — FAMOTIDINE 20 MG/1
20 TABLET, FILM COATED ORAL 2 TIMES DAILY
Qty: 60 TABLET | Refills: 3 | Status: SHIPPED | OUTPATIENT
Start: 2021-01-23 | End: 2021-03-17 | Stop reason: ALTCHOICE

## 2021-01-23 RX ORDER — ATORVASTATIN CALCIUM 80 MG/1
80 TABLET, FILM COATED ORAL NIGHTLY
Qty: 30 TABLET | Refills: 3 | Status: SHIPPED
Start: 2021-01-23 | End: 2021-05-06 | Stop reason: SINTOL

## 2021-01-23 RX ORDER — CARVEDILOL 3.12 MG/1
3.12 TABLET ORAL 2 TIMES DAILY WITH MEALS
Qty: 60 TABLET | Refills: 3 | Status: SHIPPED | OUTPATIENT
Start: 2021-01-23 | End: 2021-05-06 | Stop reason: SDUPTHER

## 2021-01-23 RX ORDER — CARVEDILOL 3.12 MG/1
3.12 TABLET ORAL 2 TIMES DAILY WITH MEALS
Status: DISCONTINUED | OUTPATIENT
Start: 2021-01-23 | End: 2021-01-23 | Stop reason: HOSPADM

## 2021-01-23 RX ORDER — AMOXICILLIN AND CLAVULANATE POTASSIUM 875; 125 MG/1; MG/1
1 TABLET, FILM COATED ORAL 2 TIMES DAILY
Qty: 10 TABLET | Refills: 0 | Status: SHIPPED | OUTPATIENT
Start: 2021-01-23 | End: 2021-01-28

## 2021-01-23 RX ORDER — ASPIRIN 81 MG/1
81 TABLET, CHEWABLE ORAL DAILY
Qty: 30 TABLET | Refills: 3 | Status: SHIPPED | OUTPATIENT
Start: 2021-01-24

## 2021-01-23 RX ADMIN — ASPIRIN 81 MG: 81 TABLET, CHEWABLE ORAL at 09:38

## 2021-01-23 RX ADMIN — LISINOPRIL 20 MG: 20 TABLET ORAL at 09:38

## 2021-01-23 RX ADMIN — TICAGRELOR 90 MG: 90 TABLET ORAL at 09:38

## 2021-01-23 RX ADMIN — HYDROCHLOROTHIAZIDE 25 MG: 25 TABLET ORAL at 09:38

## 2021-01-23 RX ADMIN — CARVEDILOL 3.12 MG: 3.12 TABLET, FILM COATED ORAL at 12:38

## 2021-01-23 RX ADMIN — FAMOTIDINE 20 MG: 20 TABLET ORAL at 09:38

## 2021-01-23 ASSESSMENT — ENCOUNTER SYMPTOMS
ABDOMINAL PAIN: 0
EYE DISCHARGE: 0
SORE THROAT: 0
DIARRHEA: 0
VOICE CHANGE: 0
STRIDOR: 0
EYE PAIN: 0
CHEST TIGHTNESS: 0
CHOKING: 0
CONSTIPATION: 0
EYE ITCHING: 0

## 2021-01-23 NOTE — CONSULTS
PRN Meds:   sodium chloride flush, acetaminophen, ondansetron, glucose, dextrose, glucagon (rDNA), dextrose, promethazine **OR** ondansetron, acetaminophen **OR** acetaminophen, polyethylene glycol   Inpatient consult to Pulmonology  Consult performed by: Veronica Barr MD  Consult ordered by: Lucrezia Goodell, MD        ALLERGIES:  Patient has No Known Allergies. REVIEW OF SYSTEMS:  Review of Systems   Constitutional: Negative for chills, fever and unexpected weight change. HENT: Negative for mouth sores, sore throat and voice change. Eyes: Negative for pain, discharge and itching. Respiratory: Negative for choking, chest tightness and stridor. Cardiovascular: Negative for chest pain, palpitations and leg swelling. Gastrointestinal: Negative for abdominal pain, constipation and diarrhea. Endocrine: Negative for cold intolerance, heat intolerance and polydipsia. Genitourinary: Negative for dysuria, frequency and hematuria. Musculoskeletal: Negative for gait problem, joint swelling and neck stiffness. Neurological: Negative for dizziness, numbness and headaches. Psychiatric/Behavioral: Negative for agitation, confusion and hallucinations. Objective:   PHYSICAL EXAM:  /74   Pulse 76   Temp 98 °F (36.7 °C) (Oral)   Resp 18   Ht 6' 2\" (1.88 m)   Wt 275 lb 14.4 oz (125.1 kg)   SpO2 97%   BMI 35.42 kg/m²    Physical Exam  Constitutional:       General: He is not in acute distress. Appearance: He is well-developed. He is not diaphoretic. HENT:      Head: Normocephalic and atraumatic. Mouth/Throat:      Pharynx: No oropharyngeal exudate. Eyes:      Pupils: Pupils are equal, round, and reactive to light. Neck:      Musculoskeletal: Neck supple. Vascular: No JVD. Cardiovascular:      Heart sounds: Normal heart sounds. No murmur. No friction rub. No gallop. Pulmonary:      Effort: Pulmonary effort is normal.      Breath sounds: No wheezing or rales. Abdominal:      General: Bowel sounds are normal. There is no distension. Palpations: Abdomen is soft. Tenderness: There is no abdominal tenderness. Musculoskeletal: Normal range of motion. Lymphadenopathy:      Cervical: No cervical adenopathy. Skin:     General: Skin is warm and dry. Findings: No rash. Neurological:      Mental Status: He is alert. Cranial Nerves: No cranial nerve deficit. Comments: CN 2-12 grossly intact            Data Reviewed:   LABS:  CBC:   Recent Labs     01/21/21  0528 01/21/21  1159 01/22/21  0529   WBC 8.8 9.9 9.1   HGB 14.1 13.4* 12.9*   HCT 43.2 40.2* 39.1*   MCV 90.7 90.8 90.3    192 181     BMP:   Recent Labs     01/21/21  0528 01/22/21  0529    141   K 3.4* 3.7    104   CO2 28 31   BUN 29* 24*   CREATININE 0.9 0.8     LIVER PROFILE:   Recent Labs     01/21/21  0528 01/22/21  0529   AST 17 103*   ALT 33 46*   BILIDIR  --  <0.2   BILITOT 0.4 0.7   ALKPHOS 53 50     PT/INR:   Recent Labs     01/21/21  0528   PROTIME 12.1   INR 1.04     APTT:  Recent Labs     01/21/21  0528 01/21/21  0943 01/21/21  1158   APTT 25.4 63.2* 35.0     UA:No results for input(s): NITRITE, COLORU, PHUR, LABCAST, WBCUA, RBCUA, MUCUS, TRICHOMONAS, YEAST, BACTERIA, CLARITYU, SPECGRAV, LEUKOCYTESUR, UROBILINOGEN, BILIRUBINUR, BLOODU, GLUCOSEU, AMORPHOUS in the last 72 hours. Invalid input(s): KETONESU  No results for input(s): PHART, KCJ5WXZ, PO2ART in the last 72 hours. Vent Information  SpO2: 97 %    CXR personally reviewed, bilateral atelectasis vs infiltrates          Assessment:     1. Acute minor hemoptysis  2. CAD with STEMI s/p PCI  3. Nonsmoker    Plan:      -Hemoptysis is favored to be benign etiology related to antiplatelet therapy, given the amount would not need to hold this treatment  -Will obtain a CT chest for completeness to ensure no alternate pulmonary pathology exists to explain it, should it be negative he can be discharged.  If it shows pneumonia would recommend a five day course of Augmentin  -Ok to discharge pending CT results from our standpoint, discussed with the patient and nurse     Batsheva Denis MD

## 2021-01-23 NOTE — PROGRESS NOTES
CARDIOLOGY PROGRESS NOTE      Patient Name: Martha Haddad  Date of admission: 1/21/2021  5:28 AM  Admission Dx: STEMI involving right coronary artery ([facility] [I21.11]  STEMI (ST elevation myocardial infarction) ([facility] [I21.3]  Reason for Consult: STEMI  Requesting Physician: Soni Mejia MD  Primary Care physician: Chastity Castillo DO    Subjective:     Mr. Martha Haddad is a 78 y.o. male patient, follows with EP for history of atrial flutter ablation in 2018 with Dr. Santi Bolanos, who presented as STEMI following chest pain that woke him up at 1 am 1/21/21. Found to have acute inferior STEMI on presentation. Found to have 100% RCA occlusion status post FADIA x 2. Residual non-obstructive disease of LCx/LAD. LVEDP 28. LV gram 50-55% with subsequent echo showing low normal EF 50% with mild MR/TR. Did not tolerate metoprolol overnight - anxious with the medication. Sense of \"impending doom. \"    Today, he states no recurrence chest pain. No dyspnea. No palpitations. No dizziness. Confirms did not tolerate metoprolol. Had one more episode of teaspoon sized clot that he cough up this AM/hemoptysis. He was COVID-19 tested on arrival and feels this could be related. Denies throat pain. No trouble swallowing. No hematochezia. Little black stools HD #1. Normal today. Home Medications:  Were reviewed and are listed in nursing record and/or below  Prior to Admission medications    Medication Sig Start Date End Date Taking?  Authorizing Provider   atorvastatin (LIPITOR) 10 MG tablet TAKE ONE TABLET BY MOUTH DAILY 11/23/20  Yes Hunter Prieto DO   lisinopril-hydroCHLOROthiazide (PRINZIDE;ZESTORETIC) 20-25 MG per tablet TAKE ONE TABLET BY MOUTH DAILY 11/23/20  Yes Chau Prieto DO   meloxicam (MOBIC) 15 MG tablet Take 1 tablet by mouth daily 1/23/20   Jennyfer Schultz MD        CURRENT Medications:      metoprolol tartrate (LOPRESSOR) tablet 25 mg, BID      sodium chloride flush 0.9 % injection 10 mL, 2 times per day      sodium chloride flush 0.9 % injection 10 mL, PRN      acetaminophen (TYLENOL) tablet 650 mg, Q4H PRN      ticagrelor (BRILINTA) tablet 90 mg, BID      atorvastatin (LIPITOR) tablet 80 mg, Nightly      ondansetron (ZOFRAN) injection 4 mg, Q6H PRN      glucose (GLUTOSE) 40 % oral gel 15 g, PRN      dextrose 50 % IV solution, PRN      glucagon (rDNA) injection 1 mg, PRN      dextrose 5 % solution, PRN      insulin lispro (HUMALOG) injection vial 10 Units, TID WC      insulin lispro (HUMALOG) injection vial 0-12 Units, TID WC      insulin lispro (HUMALOG) injection vial 0-6 Units, Nightly      meloxicam (MOBIC) tablet 15 mg, Daily      promethazine (PHENERGAN) tablet 12.5 mg, Q6H PRN    Or      ondansetron (ZOFRAN) injection 4 mg, Q6H PRN      acetaminophen (TYLENOL) tablet 650 mg, Q6H PRN    Or      acetaminophen (TYLENOL) suppository 650 mg, Q6H PRN      polyethylene glycol (GLYCOLAX) packet 17 g, Daily PRN      aspirin chewable tablet 81 mg, Daily      famotidine (PEPCID) tablet 20 mg, BID      enoxaparin (LOVENOX) injection 40 mg, Daily      lisinopril (PRINIVIL;ZESTRIL) tablet 20 mg, Daily    And      hydroCHLOROthiazide (HYDRODIURIL) tablet 25 mg, Daily      mupirocin (BACTROBAN) 2 % ointment, BID        Allergies:  Patient has no known allergies. Review of Systems:   A 14 point review of symptoms completed. Pertinent positives identified in the HPI, all other review of symptoms negative.        Objective:     Vitals:    01/22/21 2349 01/23/21 0437 01/23/21 0520 01/23/21 0804   BP: 107/74 111/70  115/73   Pulse: 73 69  68   Resp: 16 17  12   Temp: 98.6 °F (37 °C) 98.1 °F (36.7 °C)  98 °F (36.7 °C)   TempSrc: Oral Oral     SpO2: 96% 97%  95%   Weight:   275 lb 14.4 oz (125.1 kg)    Height:          Weight: 275 lb 14.4 oz (125.1 kg)       PHYSICAL EXAM:      General:  Alert, cooperative, no distress, appears stated age   Head:  Normocephalic, atraumatic   Eyes: Conjunctiva/corneas clear, anicteric sclerae    Nose: Nares normal, no drainage or sinus tenderness   Throat: No abnormalities of the lips, oral mucosa or tongue. Neck: Trachea midline. Neck supple with no lymphadenopathy, thyroid not enlarged, symmetric, no tenderness/mass/nodules, no Jugular venous pressure elevation    Lungs:   Clear to auscultation bilaterally, no wheezes, no rales, no respiratory distress   Chest Wall:  No deformity or tenderness to palpation   Heart:  Regular rate and rhythm, normal S1, normal S2, no murmur, no rub, no S3/S4, PMI non-displaced. Abdomen:   Soft, non-tender, with normoactive bowel sounds. No masses, no hepatosplenomegaly   Extremities: No cyanosis, clubbing or pitting edema. Vascular: 2+ radial, dorsalis pedis and posterior tibial pulses bilaterally. Brisk carotid upstrokes without carotid bruit. Skin: Skin color, texture, turgor are normal with no rashes or ulceration. Pysch: Euthymic mood, appropriate affect   Neurologic: Oriented to person, place and time. No slurred speech or facial asymmetry. No motor or sensory deficits on gross examination.          Labs:   CBC:   Lab Results   Component Value Date    WBC 9.1 01/22/2021    RBC 4.32 01/22/2021    HGB 12.9 01/22/2021    HCT 39.1 01/22/2021    MCV 90.3 01/22/2021    RDW 13.9 01/22/2021     01/22/2021     CMP:  Lab Results   Component Value Date     01/22/2021    K 3.7 01/22/2021     01/22/2021    CO2 31 01/22/2021    BUN 24 01/22/2021    CREATININE 0.8 01/22/2021    GFRAA >60 01/22/2021    GFRAA >60 09/18/2012    AGRATIO 1.5 01/21/2021    LABGLOM >60 01/22/2021    GLUCOSE 129 01/22/2021    PROT 6.1 01/22/2021    PROT 6.7 09/18/2012    CALCIUM 9.1 01/22/2021    BILITOT 0.7 01/22/2021    ALKPHOS 50 01/22/2021     01/22/2021    ALT 46 01/22/2021     PT/INR:  No results found for: PTINR  HgBA1c:  Lab Results   Component Value Date    LABA1C 6.3 01/21/2021     Lab Results   Component Value Date    TROPONINI 2.40 ([facility]) 01/21/2021         Interval Testing/Data:     C: 1/21/21:  Left Heart Cath  Dominance: Right       LM: normal   LAD: 40% eccentric proximal disease  LCx: 30% proximal stenosis   RCA: 100% mid occlusion     LVEDP: 28 mmHg  LVEF: 50-55%     6 Fr JR 4 Guide with BMW wire and guideliner extension      3.5 x 12 mm predil (see log)     4.0 x 38 mm Xience FADIA extending to distal right   4.0 x 33 mm Xience FADIA overlapping previous stent proximally to mid vessel     5.0 x 20 mm NC Trek postdil (from 4.6 mm distally within stented segment to 4.8 proximally)     0% residual   Large RPDA and RPLB with ARLYN III Flow      Impression/Recommendations:  Successful PCI to mid RCA occlusion with two long overlapping drug eluting stents   ASA loaded in field/Ticagrelor loaded in lab: DAPT recommended for 12 months   Aggrastat gtt for 6 hours   Admit to CVU and obtain echo  Initiate beta blocekr and statin.      Tele - no events. HR up to 90s this AM following BB held. TTE 1/21/21:  Conclusions   Summary   Technically difficult study due to poor acoustic windows, definity is used   for myocardial border enhancement.   Left ventricular systolic function is low normal with a visually estimated   ejection fraction of 50%.  Grade I diastolic dysfunction with normal filling pressure.   Mild mitral and tricuspid regurgitation.   Systolic pulmonary artery pressure (SPAP) is normal and estimated at 24 mmHg   (right atrial pressure 8 mmHg).      Impression and Plan      1. CAD - S/p acute inferior STEMI and PCI to mid-distal RCA with overlapping Xience Zoie 4.0 x 38 mm and 4.0 x 33 mm FADIA, post-dilated with non-compliant 5.0 x 20 mm balloon. TTE today showed preserved LVEF with no gross wall motion abnormalities, although endocardial borders were suboptimally visualized. Denies recurrent angina. 2. Essential hypertension  3. Hyperlipidemia  4.  Atrial flutter s/p RFCA in 2018, no longer on anticoagulation  5. Paroxysmal atrial tachycardia  6. PVCs  6. Obesity  7. Hemoptysis - no RHC performed, no suspicion of bronchial artery rupture. May be COVID swab related. 8. Non-smoker        Patient Active Problem List   Diagnosis    Essential hypertension    Carotid stenosis, non-symptomatic    Primary osteoarthritis involving multiple joints    Family history of abdominal aortic aneurysm    Bilateral leg pain    Morbidly obese (HCC)    Leg swelling    Hyperlipidemia    Typical atrial flutter (HCC)    Paroxysmal atrial tachycardia (HCC)    Premature ventricular contraction    STEMI involving right coronary artery ([facility] [address]    STEMI (ST elevation myocardial infarction) ([facility]    Hypokalemia         1. Continue dual antiplatelet therapy with aspirin 81 mg daily and Brilinta 90 mg BID. 2. Continue atorvastatin 80 mg daily. 3. Change metoprolol to carvedilol BID. 4. Continue lisinopril 20 mg daily and hydrochlorothiazide 25 mg daily. 5. Monitor rhythms  6. Cardiac rehab on DC  7. Pulmonary consult for hemoptysis       I will address the patient's cardiac risk factors and adjusted pharmacologic treatment as needed. In addition, I have reinforced the need for patient directed risk factor modification. All questions and concerns were addressed to the patient/family. Alternatives to my treatment were discussed. Thank you for allowing us to participate in the care of Kellee Arechiga. Please call me with any questions [phone].     Virgen Holly MD   Cardiovascular Disease  [facility]  [phone] [facility]  [phone] [address] [place] [address]  1/23/2021 5:43 AM

## 2021-01-23 NOTE — PROGRESS NOTES
Discharge instructions and medications reviewed with patient and family at bedside. IV and Tele discontinued, patient has no questions at this time. New medication  prescription instructions explained in full. All belongings accounted for. Discharge packet copy sheet signed and placed in chart. [facility] notified. Awaiting patients ride at this time. Electronically signed by Victor Manuel Locke RN on 1/23/2021 at 5:34 PM      Pt discharged.

## 2021-01-23 NOTE — PROGRESS NOTES
Hospitalist Progress Note      PCP: Batsheva Bell DO    Date of Admission: 1/21/2021    Chief Complaint: Chest pain    Hospital Course: This is a 42-year-old male admitted with STEMI, cardiology consulted is status post cardiac cath and PCI x2 to RCA    Subjective: Patient sitting in a chair denies any chest pain or shortness of breath no nausea vomiting no diarrhea. Medications:  Reviewed    Infusion Medications    dextrose       Scheduled Medications    metoprolol tartrate  25 mg Oral BID    sodium chloride flush  10 mL Intravenous 2 times per day    ticagrelor  90 mg Oral BID    atorvastatin  80 mg Oral Nightly    insulin lispro  0.08 Units/kg Subcutaneous TID WC    insulin lispro  0-12 Units Subcutaneous TID WC    insulin lispro  0-6 Units Subcutaneous Nightly    meloxicam  15 mg Oral Daily    aspirin  81 mg Oral Daily    famotidine  20 mg Oral BID    enoxaparin  40 mg Subcutaneous Daily    lisinopril  20 mg Oral Daily    And    hydroCHLOROthiazide  25 mg Oral Daily    mupirocin   Nasal BID     PRN Meds: sodium chloride flush, acetaminophen, ondansetron, glucose, dextrose, glucagon (rDNA), dextrose, promethazine **OR** ondansetron, acetaminophen **OR** acetaminophen, polyethylene glycol      Intake/Output Summary (Last 24 hours) at 1/23/2021 0901  Last data filed at 1/23/2021 0520  Gross per 24 hour   Intake 720 ml   Output 650 ml   Net 70 ml       Physical Exam Performed:    /73   Pulse 68   Temp 98 °F (36.7 °C)   Resp 12   Ht 6' 2\" (1.88 m)   Wt 275 lb 14.4 oz (125.1 kg)   SpO2 95%   BMI 35.42 kg/m²     General appearance: No apparent distress, appears stated age and cooperative. HEENT: Pupils equal, round, and reactive to light. Conjunctivae/corneas clear. Neck: Supple, with full range of motion. No jugular venous distention. Trachea midline. Respiratory:  Normal respiratory effort. Clear to auscultation, bilaterally without Rales/Wheezes/Rhonchi.   Cardiovascular: Regular rate and rhythm with normal S1/S2 without murmurs, rubs or gallops. Abdomen: Soft, non-tender, non-distended with normal bowel sounds. Musculoskeletal: No clubbing, cyanosis or edema bilaterally. Full range of motion without deformity. Skin: Skin color, texture, turgor normal.  No rashes or lesions. Neurologic:  Neurovascularly intact without any focal sensory/motor deficits. Cranial nerves: II-XII intact, grossly non-focal.  Psychiatric: Alert and oriented, thought content appropriate, normal insight  Capillary Refill: Brisk,< 3 seconds   Peripheral Pulses: +2 palpable, equal bilaterally       Labs:   Recent Labs     01/21/21  0528 01/21/21  1159 01/22/21  0529   WBC 8.8 9.9 9.1   HGB 14.1 13.4* 12.9*   HCT 43.2 40.2* 39.1*    192 181     Recent Labs     01/21/21  0528 01/22/21  0529    141   K 3.4* 3.7    104   CO2 28 31   BUN 29* 24*   CREATININE 0.9 0.8   CALCIUM 9.5 9.1     Recent Labs     01/21/21  0528 01/22/21  0529   AST 17 103*   ALT 33 46*   BILIDIR  --  <0.2   BILITOT 0.4 0.7   ALKPHOS 53 50     Recent Labs     01/21/21  0528   INR 1.04     Recent Labs     01/21/21  0528 01/21/21  0943 01/21/21  1159   TROPONINI <0.01 1.37* 2.40*       Urinalysis:      Lab Results   Component Value Date    BLOODU neg 10/12/2018    SPECGRAV 1.020 10/12/2018    GLUCOSEU neg 10/12/2018       Radiology:  XR CHEST PORTABLE   Final Result   Bibasilar atelectasis or, less likely, pneumonia. Assessment/Plan:    Active Hospital Problems    Diagnosis    [facility] [I21.11]    STEMI (ST elevation myocardial infarction) ([facility] [I21.3]    Hypokalemia [E87.6]    Typical atrial flutter ([facility] [I48.3]    Hyperlipidemia [E78.5]    Morbidly obese ([facility] [E66.01]    Essential hypertension [I10]     1.   Admitted with STEMI cardiology consult appreciated status post cardiac cath and stent to  RCA,S/p acute inferior STEMI and PCI to mid-distal RCA with Follow up with optho tomorrow

## 2021-01-25 ENCOUNTER — TELEPHONE (OUTPATIENT)
Dept: FAMILY MEDICINE CLINIC | Age: 71
End: 2021-01-25

## 2021-01-26 ENCOUNTER — CARE COORDINATION (OUTPATIENT)
Dept: CASE MANAGEMENT | Age: 71
End: 2021-01-26

## 2021-01-26 DIAGNOSIS — I21.11 ST ELEVATION MYOCARDIAL INFARCTION INVOLVING RIGHT CORONARY ARTERY (HCC): Primary | ICD-10-CM

## 2021-01-26 PROCEDURE — 1111F DSCHRG MED/CURRENT MED MERGE: CPT | Performed by: FAMILY MEDICINE

## 2021-01-26 NOTE — CARE COORDINATION
[facility] Transitions Initial Follow Up Call    Call within 2 business days of discharge: Yes    Patient: Mikey Sims Patient : 1950   MRN: [id number]  Reason for Admission: NSTEMI  Discharge Date: 21 RARS: Readmission Risk Score: 11      Last Discharge [address] [address]       Complaint Diagnosis Description Type Department Provider    21 Chest Pain Acute ST elevation myocardial infarction (STEMI) involving other coronary artery of inferior wall [facility]) ED to Hosp-Admission (Discharged) (ADMIT) Manuel Garcia MD; Arvin Wall. .. Spoke with: [address]: [facility]      Challenges to be reviewed by the provider   Additional needs identified to be addressed with provider No  none       Method of communication with provider : phone    Advance Care Planning:   Does patient have an Advance Directive:  not on file. Was this a readmission? No  Patients top risk factors for readmission: medical condition    Care Transition Nurse (CTN) contacted the patient by telephone to perform post hospital discharge assessment. Verified name and  with patient as identifiers. Provided introduction to self, and explanation of the CTN role. CTN reviewed discharge instructions, medical action plan and red flags with patient who verbalized understanding. Patient given an opportunity to ask questions and does not have any further questions or concerns at this time. Were discharge instructions available to patient? Yes. Reviewed appropriate site of care based on symptoms and resources available to patient including: PCP and Specialist. The patient agrees to contact the PCP office for questions related to their healthcare. Medication reconciliation was performed with patient, who verbalizes understanding of administration of home medications. Advised obtaining a 90-day supply of all daily and as-needed medications. Discussed follow-up appointments.  If no appointment was previously scheduled, appointment scheduling offered: Yes. Is follow up appointment scheduled within 7 days of discharge? Yes    Plan for follow-up call in 5-7 days based on severity of symptoms and risk factors. Patient pleasant and agreeable to transition call. Patient verified  and discussed recent hospitalization. Patient feeling better, but having some fatigue and not sleeping well last night. Patient reviewed all medications and confirmed that he is taking them as directed. CTN reviewed purpose, frequency, and possible side effects of new medications. Patient has follow up appt scheduled next week and confirms transportation to appt. Denies any acute needs at present time. Agreeable to f/u calls. Educated on the use of urgent care or physicians 24 hr access line if assistance is needed after hours. CTN provided contact information for future needs.     Care Transitions 24 Hour Call    Do you have any ongoing symptoms?: No  Do you have a copy of your discharge instructions?: Yes  Do you have all of your prescriptions and are they filled?: Yes  Have you been contacted by a [employer] [employer]?: No  Have you scheduled your follow up appointment?: Yes  How are you going to get to your appointment?: Car - family or friend to transport  Were you discharged with any Home Care or Post Acute Services: No  Do you have support at home?: Alone  Do you feel like you have everything you need to keep you well at home?: Yes  Are you an active caregiver in your home?: No  Care Transitions Interventions         Follow Up  Future Appointments   Date Time Provider Filippo Richard   2/3/2021  2:00 PM Noel MORFIN   2021 10:00 AM DO CHRISTIANO Sanders RN

## 2021-01-30 NOTE — DISCHARGE SUMMARY
Hospital Medicine Discharge Summary    Patient ID: Lurquentin Tony      Patient's PCP: Lory Schuster DO    Admit Date: 1/21/2021     Discharge Date: 1/23/2021      Admitting Physician: Adama Paredes MD     Discharge Physician: Arnold Oswald MD     Discharge Diagnoses: Active Hospital Problems    Diagnosis    STEMI involving right coronary artery ([facility] [I21.11]    STEMI (ST elevation myocardial infarction) ([facility] [I21.3]    Hypokalemia [E87.6]    Typical atrial flutter ([address] [I48.3]    Hyperlipidemia [E78.5]    Morbidly obese ([facility] [E66.01]    Essential hypertension [I10]       The patient was seen and examined on day of discharge and this discharge summary is in conjunction with any daily progress note from day of discharge. Hospital Course: This is a 32-year-old male admitted with STEMI, cardiology consulted is status post cardiac cath and PCI x2 to RCA  1. Admitted with STEMI cardiology consult appreciated status post cardiac cath and stent to  RCA,S/p acute inferior STEMI and PCI to mid-distal RCA with overlapping Xience Zoie 4.0 x 38 mm and 4.0 x 33 mm AFDIA, post-dilated with non-compliant 5.0 x 20 mm balloon.  TTE today showed preserved LVEF with no gross wall motion abnormalities, continue with the current care on aspirin, Brilinta, Lipitor, lisinopril, metoprolol. 2.  Hypertension controlled continue with current medication. 3.  Hyperlipidemia on statin. 4.  Obesity BMI 35.42 weight loss and exercise      Physical Exam Performed:     /74   Pulse 76   Temp 98 °F (36.7 °C) (Oral)   Resp 18   Ht 6' 2\" (1.88 m)   Wt 275 lb 14.4 oz (125.1 kg)   SpO2 97%   BMI 35.42 kg/m²       General appearance:  No apparent distress, appears stated age and cooperative. HEENT:  Normal cephalic, atraumatic without obvious deformity. Pupils equal, round, and reactive to light. Extra ocular muscles intact. Conjunctivae/corneas clear. Neck: Supple, with full range of motion. No jugular venous distention. Trachea midline. Respiratory:  Normal respiratory effort. Clear to auscultation, bilaterally without Rales/Wheezes/Rhonchi. Cardiovascular:  Regular rate and rhythm with normal S1/S2 without murmurs, rubs or gallops. Abdomen: Soft, non-tender, non-distended with normal bowel sounds. Musculoskeletal:  No clubbing, cyanosis or edema bilaterally. Full range of motion without deformity. Skin: Skin color, texture, turgor normal.  No rashes or lesions. Neurologic:  Neurovascularly intact without any focal sensory/motor deficits. Cranial nerves: II-XII intact, grossly non-focal.  Psychiatric:  Alert and oriented, thought content appropriate, normal insight  Capillary Refill: Brisk,< 3 seconds   Peripheral Pulses: +2 palpable, equal bilaterally       Labs: For convenience and continuity at follow-up the following most recent labs are provided:      CBC:    Lab Results   Component Value Date    WBC 9.1 01/22/2021    HGB 12.9 01/22/2021    HCT 39.1 01/22/2021     01/22/2021       Renal:    Lab Results   Component Value Date     01/22/2021    K 3.7 01/22/2021     01/22/2021    CO2 31 01/22/2021    BUN 24 01/22/2021    CREATININE 0.8 01/22/2021    CALCIUM 9.1 01/22/2021    PHOS 3.0 05/18/2018         Significant Diagnostic Studies    Radiology:   CT CHEST WO CONTRAST   Final Result   1. Subsegmental atelectasis or scarring in the lungs. No acute airspace   disease identified. XR CHEST PORTABLE   Final Result   Bibasilar atelectasis or, less likely, pneumonia.                 Consults:     IP CONSULT TO CARDIAC REHAB  IP CONSULT TO CARDIOLOGY  IP CONSULT TO PULMONOLOGY    Disposition: Home    Condition at Discharge: Stable    Discharge Instructions/Follow-up: Follow-up with PCP within a week cardiology as instructed    Code Status: Full code    Activity: activity as tolerated    Diet: Cardiac      Discharge Medications:     Discharge Medication List as of 1/23/2021 5:09 PM           Details   aspirin 81 MG chewable tablet Take 1 tablet by mouth daily, Disp-30 tablet, R-3Normal      carvedilol (COREG) 3.125 MG tablet Take 1 tablet by mouth 2 times daily (with meals), Disp-60 tablet, R-3Normal      ticagrelor (BRILINTA) 90 MG TABS tablet Take 1 tablet by mouth 2 times daily, Disp-60 tablet, R-5Normal      famotidine (PEPCID) 20 MG tablet Take 1 tablet by mouth 2 times daily, Disp-60 tablet, R-3Normal      amoxicillin-clavulanate (AUGMENTIN) 875-125 MG per tablet Take 1 tablet by mouth 2 times daily for 5 days, Disp-10 tablet, R-0Normal              Details   atorvastatin (LIPITOR) 80 MG tablet Take 1 tablet by mouth nightly, Disp-30 tablet, R-3Normal              Details   lisinopril-hydroCHLOROthiazide (PRINZIDE;ZESTORETIC) 20-25 MG per tablet TAKE ONE TABLET BY MOUTH DAILY, Disp-90 tablet,R-3Normal             Time Spent on discharge is more than 35 minutes in the examination, evaluation, counseling and review of medications and discharge plan. Signed:    Trevor Jennings MD   1/30/2021      Thank you Daisha Torres DO for the opportunity to be involved in this patient's care. If you have any questions or concerns please feel free to contact me at [phone].

## 2021-02-02 NOTE — PROGRESS NOTES
[address] [facility]   Cardiovascular Evaluation    PATIENT: Yoanna Soni  DATE: 2/3/2021  MRN: [id number]  CSN: [id number]  : 1950      Primary Care Doctor: Nilton Germain DO  Reason for evaluation:   Follow-Up from Hospital, Shortness of Breath, and Fatigue      Subjective:   History of present illness on initial date of evaluation:  Mj Baxter is a 60-year-old male with a history of CAD s/p inferior STEMI and PCI to mid-distal RCA with overlapping Xience Zoie 4.0 x 38 mm and 4.0 x 33 mm FADIA (tapered to final diameter of 4.8 mm) on 21, atrial flutter s/p RFCA on 18 (no longer on anticoagulation), paroxysmal atrial tachycardia, PVCs, essential hypertension, hyperlipidemia, and obesity who presents for follow-up. The patient was recently admitted to [facility] from -21 with an acute anterior STEMI for which he underwent PCI to the mid-distal RCA with overlapping Xience Zoie 4.0 x 38 mm and 4.0 x 33 mm FADIA, tapered to final diameter of 4.8 mm. Angiography at that time was also notable for a 40% eccentric proximal LAD stenosis and 30% proximal LCx stenosis. TTE obtained during his admission showed an LVEF of 50% with no wall motion abnormalities, grade 1 diastolic dysfunction, normal RV size and systolic function, and mild mitral and tricuspid regurgitation. Since his discharge, the patient reports that he has felt fatigued in the mornings and had some mild exertional shortness of breath. His fatigue seems to improve throughout the day. 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 He denies any recurrent chest pain, lightheadedness, palpitations, lower extremity edema, orthopnea, or paroxysmal nocturnal dyspnea. He has been compliant with his dual antiplatelet therapy.         Patient Active Problem List   Diagnosis  Essential hypertension    Carotid stenosis, non-symptomatic    Primary osteoarthritis involving multiple joints    Family history of abdominal aortic aneurysm    Bilateral leg pain    Morbidly obese (HCC)    Leg swelling    Hyperlipidemia    Typical atrial flutter (HCC)    Paroxysmal atrial tachycardia (HCC)    Premature ventricular contraction    STEMI involving right coronary artery ([facility]    STEMI (ST elevation myocardial infarction) ([facility]    Hypokalemia         Past Medical History:  CAD, atrial flutter, paroxysmal atrial tachycardia, PVCs, hyperlipidemia, hypertension, obesity. Surgical History:   has a past surgical history that includes Appendectomy; Dental surgery; and Cardiac surgery (05/2018). Social History:   reports that he has never smoked. He has never used smokeless tobacco. He reports that he does not drink alcohol or use drugs. Family History:  Family history is notable for AAA in brother. Home Medications:  Reviewed and are listed in nursing record. and/or listed below  Current Outpatient Medications   Medication Sig Dispense Refill    aspirin 81 MG chewable tablet Take 1 tablet by mouth daily 30 tablet 3    atorvastatin (LIPITOR) 80 MG tablet Take 1 tablet by mouth nightly 30 tablet 3    carvedilol (COREG) 3.125 MG tablet Take 1 tablet by mouth 2 times daily (with meals) 60 tablet 3    ticagrelor (BRILINTA) 90 MG TABS tablet Take 1 tablet by mouth 2 times daily 60 tablet 5    famotidine (PEPCID) 20 MG tablet Take 1 tablet by mouth 2 times daily 60 tablet 3    lisinopril-hydroCHLOROthiazide (PRINZIDE;ZESTORETIC) 20-25 MG per tablet TAKE ONE TABLET BY MOUTH DAILY 90 tablet 3     No current facility-administered medications for this visit. Allergies:  Patient has no known allergies. Review of Systems:   A 14 point review of symptoms completed. Pertinent positives identified in the HPI, all other review of symptoms negative as below.   Review of Systems Constitutional: Positive for fatigue. Negative for chills and fever. HENT: Negative for congestion, rhinorrhea and sore throat. Eyes: Negative for photophobia, pain and visual disturbance. Respiratory: Positive for shortness of breath. Negative for cough. Cardiovascular: Negative for chest pain, palpitations and leg swelling. Gastrointestinal: Negative for abdominal pain, blood in stool, constipation, diarrhea, nausea and vomiting. Endocrine: Negative for cold intolerance and heat intolerance. Genitourinary: Negative for difficulty urinating, dysuria and hematuria. Musculoskeletal: Negative for arthralgias, joint swelling and myalgias. Skin: Negative for rash and wound. Allergic/Immunologic: Negative for environmental allergies and food allergies. Neurological: Negative for dizziness, syncope and light-headedness. Hematological: Negative for adenopathy. Does not bruise/bleed easily. Psychiatric/Behavioral: Negative for dysphoric mood. The patient is not nervous/anxious. Objective:   PHYSICAL EXAM:    Vitals:    02/03/21 1358   BP: 138/64   Pulse: 73   SpO2: 98%    Weight: 273 lb 8 oz (124.1 kg)     Wt Readings from Last 3 Encounters:   02/03/21 273 lb 8 oz (124.1 kg)   01/23/21 275 lb 14.4 oz (125.1 kg)   11/23/20 279 lb 6.4 oz (126.7 kg)     General: Adult male in no acute distress. Pleasant and interactive on exam.  HEENT: Normocephalic, atraumatic, non-icteric, hearing intact, nares normal, mucous membranes moist.  Neck: No JVD. Heart: Regular rate and rhythm. Normal S1 and S2. No murmurs, gallops or rubs. Lungs: Normal respiratory effort. Clear to auscultation bilaterally. No wheezes, rales, or rhonchi. Abdomen: Soft, non-tender. Normoactive bowel sounds. No masses or organomegaly. Skin: No rashes, wounds, or lesions. Pulses: 2+ and symmetric. Extremities: Right radial artery access site is soft, non-tender, and without evidence of bleeding or hematoma.   Right radial artery pulse 2+. No clubbing, cyanosis, or edema. Psych: Normal mood and affect. Neuro: Alert and oriented to person, place, and time. No focal deficits noted. LABS   CBC:      Lab Results   Component Value Date    WBC 9.1 01/22/2021    RBC 4.32 01/22/2021    HGB 12.9 01/22/2021    HCT 39.1 01/22/2021    MCV 90.3 01/22/2021    RDW 13.9 01/22/2021     01/22/2021     CMP:  Lab Results   Component Value Date     01/22/2021    K 3.7 01/22/2021     01/22/2021    CO2 31 01/22/2021    BUN 24 01/22/2021    CREATININE 0.8 01/22/2021    GFRAA >60 01/22/2021    GFRAA >60 09/18/2012    AGRATIO 1.5 01/21/2021    LABGLOM >60 01/22/2021    GLUCOSE 129 01/22/2021    PROT 6.1 01/22/2021    PROT 6.7 09/18/2012    CALCIUM 9.1 01/22/2021    BILITOT 0.7 01/22/2021    ALKPHOS 50 01/22/2021     01/22/2021    ALT 46 01/22/2021     PT/INR:   No results found for: PTINR  Liver:  No components found for: CHLPL  Lab Results   Component Value Date    ALT 46 (H) 01/22/2021     (H) 01/22/2021    ALKPHOS 50 01/22/2021    BILITOT 0.7 01/22/2021     Lab Results   Component Value Date    LABA1C 6.3 01/21/2021     Lipids:         Lab Results   Component Value Date    TRIG 143 01/22/2021    TRIG 101 11/23/2020    TRIG 83 10/14/2019            Lab Results   Component Value Date    HDL 31 (L) 01/22/2021    HDL 38 (L) 11/23/2020    HDL 39 (L) 10/14/2019            Lab Results   Component Value Date    LDLCALC 70 01/22/2021    LDLCALC 101 (H) 11/23/2020    LDLCALC 84 10/14/2019            Lab Results   Component Value Date    LABVLDL 29 01/22/2021    LABVLDL 20 11/23/2020    LABVLDL 17 10/14/2019         CARDIAC DATA   EKG 1/21/2021:  Normal sinus rhythm, evolving changes of inferior infarct. ECHO:   TTE 1/22/2021:   Summary   Technically difficult study due to poor acoustic windows, definity is used   for myocardial border enhancement.    Left ventricular systolic function is low normal with a visually estimated ejection fraction of 50%. Grade I diastolic dysfunction with normal filling pressure. Mild mitral and tricuspid regurgitation. Systolic pulmonary artery pressure (SPAP) is normal and estimated at 24 mmHg   (right atrial pressure 8 mmHg). STRESS TEST: N/A    CARDIAC CATH:   [facility] 1/21/2021:  Dominance: Right      LM: normal   LAD: 40% eccentric proximal disease  LCx: 30% proximal stenosis   RCA: 100% mid occlusion     LVEDP: 28 mmHg  LVEF: 50-55%     6 Fr JR 4 Guide with BMW wire and guideliner extension      3.5 x 12 mm predil (see log)     4.0 x 38 mm Xience FADIA extending to distal right   4.0 x 33 mm Xience FADIA overlapping previous stent proximally to mid vessel     5.0 x 20 mm NC Trek postdil (from 4.6 mm distally within stented segment to 4.8 proximally)     0% residual   Large RPDA and RPLB with ARLYN III Flow      EP study and aflutter ablation 5/28/2018 (Dr. Evangelina Lemos):  CONCLUSION:  1. Complex electrophysiology study with normal baseline conduction  parameters. 2.  Status post successful cavotricuspid isthmus ablation for  counterclockwise flutter. VASCULAR/OTHER IMAGING:  Non-contrast chest CT 1/23/2021:  FINDINGS:   Mediastinum: The heart and great vessels are normal in size.  Calcified   atheromatous plaque and coronary calcifications are noted.  No pericardial   effusion.  No enlarged or suspicious-appearing lymph nodes are identified.       Lungs/pleura: Linear opacities in the medial lingula, medial right middle   lobe and lung bases are noted consistent with subsegmental atelectasis or   scarring.  Sequela of old granulomatous disease.  No consolidation.  No   evidence for edema.  No effusion.  The central airway is patent.       Upper Abdomen: Tiny hypoattenuating liver lesions are noted, which may   represent cysts.  No acute findings.       Soft Tissues/Bones: No acute osseous abnormality identified. Multilevel   degenerative change in the lower cervical and lower thoracic spine. Assessment:     1. CAD - S/p acute inferior STEMI and PCI to mid-distal RCA with overlapping Xience Zoie 4.0 x 38 mm and 4.0 x 33 mm FADIA, tapered to final diameter of 4.8 mm on 1/21/21. TTE shows preserved biventricular systolic function. Denies recurrent angina. 2. Atrial flutter - S/p RFCA in 2018. No longer on anticoagulation. Follows with EP. 3. Paroxysmal atrial tachycardia  4. PVCs  5. Mild mitral regurgitation  6. Mild tricuspid regurgitation  7. Essential hypertension  8. Hyperlipidemia  9. Obesity  10. Fatigue      Plan:     1. Continue dual antiplatelet therapy with aspirin 81 mg daily and Brilinta 90 mg BID. Brilinta should be continued through at least 1/21/22. 2. Continue atorvastatin 80 mg daily, coreg 3.125 mg BID, and lisinopril-HCTZ 20-25 mg daily. 3. Patient is not having any symptoms of GERD and OK to discontinue famotidine that was prescribed during recent hospitalization. 4. Suspect recent fatigue is most likely secondary to mild deconditioning following MI, but will check TSH. 5. Placed referral to cardiac rehab.  6. Encouraged weight loss through dietary modifications and regular physical exercise. 7. Follow-up with me in 3 months. This note was scribed in the presence of Noel Garza DO by Connor James RN. The scribes documentation has been prepared under my direction and personally reviewed by me in its entirety. I confirm that the note above accurately reflects all work, treatment, procedures, and medical decision making performed by me. I, Dr. Duayne Curling, personally performed the services described in this documentation as scribed by Connor James RN in my presence, and it is both accurate and complete to the best of our ability.          Duayne Curling, [address] [facility]  [phone] [facility]  [phone] [address] [place] [address]

## 2021-02-03 ENCOUNTER — OFFICE VISIT (OUTPATIENT)
Dept: CARDIOLOGY CLINIC | Age: 71
End: 2021-02-03
Payer: MEDICARE

## 2021-02-03 ENCOUNTER — HOSPITAL ENCOUNTER (OUTPATIENT)
Age: 71
Discharge: HOME | End: 2021-02-03
Payer: MEDICARE

## 2021-02-03 VITALS
WEIGHT: 273.5 LBS | BODY MASS INDEX: 35.1 KG/M2 | HEIGHT: 74 IN | DIASTOLIC BLOOD PRESSURE: 64 MMHG | SYSTOLIC BLOOD PRESSURE: 138 MMHG | OXYGEN SATURATION: 98 % | HEART RATE: 73 BPM

## 2021-02-03 DIAGNOSIS — E66.01 MORBIDLY OBESE (HCC): ICD-10-CM

## 2021-02-03 DIAGNOSIS — I10 ESSENTIAL HYPERTENSION: ICD-10-CM

## 2021-02-03 DIAGNOSIS — I34.0 MILD MITRAL REGURGITATION: ICD-10-CM

## 2021-02-03 DIAGNOSIS — I48.3 TYPICAL ATRIAL FLUTTER (HCC): ICD-10-CM

## 2021-02-03 DIAGNOSIS — I21.11 STEMI INVOLVING RIGHT CORONARY ARTERY (HCC): Primary | ICD-10-CM

## 2021-02-03 DIAGNOSIS — I49.3 PVC (PREMATURE VENTRICULAR CONTRACTION): ICD-10-CM

## 2021-02-03 DIAGNOSIS — E78.5 HYPERLIPIDEMIA, UNSPECIFIED HYPERLIPIDEMIA TYPE: ICD-10-CM

## 2021-02-03 DIAGNOSIS — I25.10 CORONARY ARTERY DISEASE INVOLVING NATIVE CORONARY ARTERY OF NATIVE HEART WITHOUT ANGINA PECTORIS: ICD-10-CM

## 2021-02-03 DIAGNOSIS — I07.1 MILD TRICUSPID REGURGITATION: ICD-10-CM

## 2021-02-03 DIAGNOSIS — R53.83 FATIGUE, UNSPECIFIED TYPE: ICD-10-CM

## 2021-02-03 PROBLEM — R06.02 SHORTNESS OF BREATH: Status: ACTIVE | Noted: 2021-02-03

## 2021-02-03 LAB — TSH REFLEX FT4: 2.08 UIU/ML (ref 0.27–4.2)

## 2021-02-03 PROCEDURE — 3017F COLORECTAL CA SCREEN DOC REV: CPT | Performed by: INTERNAL MEDICINE

## 2021-02-03 PROCEDURE — 84443 ASSAY THYROID STIM HORMONE: CPT

## 2021-02-03 PROCEDURE — 1123F ACP DISCUSS/DSCN MKR DOCD: CPT | Performed by: INTERNAL MEDICINE

## 2021-02-03 PROCEDURE — 1111F DSCHRG MED/CURRENT MED MERGE: CPT | Performed by: INTERNAL MEDICINE

## 2021-02-03 PROCEDURE — 1036F TOBACCO NON-USER: CPT | Performed by: INTERNAL MEDICINE

## 2021-02-03 PROCEDURE — 4040F PNEUMOC VAC/ADMIN/RCVD: CPT | Performed by: INTERNAL MEDICINE

## 2021-02-03 PROCEDURE — 99214 OFFICE O/P EST MOD 30 MIN: CPT | Performed by: INTERNAL MEDICINE

## 2021-02-03 PROCEDURE — G8427 DOCREV CUR MEDS BY ELIG CLIN: HCPCS | Performed by: INTERNAL MEDICINE

## 2021-02-03 PROCEDURE — G8417 CALC BMI ABV UP PARAM F/U: HCPCS | Performed by: INTERNAL MEDICINE

## 2021-02-03 PROCEDURE — G8484 FLU IMMUNIZE NO ADMIN: HCPCS | Performed by: INTERNAL MEDICINE

## 2021-02-03 PROCEDURE — 36415 COLL VENOUS BLD VENIPUNCTURE: CPT

## 2021-02-04 ENCOUNTER — CARE COORDINATION (OUTPATIENT)
Dept: CASE MANAGEMENT | Age: 71
End: 2021-02-04

## 2021-02-05 ENCOUNTER — TELEPHONE (OUTPATIENT)
Dept: CARDIOLOGY CLINIC | Age: 71
End: 2021-02-05

## 2021-02-05 NOTE — TELEPHONE ENCOUNTER
----- Message from Deanna Machado DO sent at 2/4/2021  5:16 PM EST -----  Please let MrLuly Isabela Moe know that his TSH was WNL.

## 2021-02-05 NOTE — TELEPHONE ENCOUNTER
----- Message from Werner Presser, DO sent at 2/4/2021  5:16 PM EST -----  Please let MrLuly Lraleksey Ackerman know that his TSH was WNL.

## 2021-02-08 ASSESSMENT — ENCOUNTER SYMPTOMS
PHOTOPHOBIA: 0
RHINORRHEA: 0
NAUSEA: 0
DIARRHEA: 0
CONSTIPATION: 0
VOMITING: 0
ABDOMINAL PAIN: 0
EYE PAIN: 0
BLOOD IN STOOL: 0
SORE THROAT: 0
COUGH: 0
SHORTNESS OF BREATH: 1

## 2021-02-11 ENCOUNTER — CARE COORDINATION (OUTPATIENT)
Dept: CASE MANAGEMENT | Age: 71
End: 2021-02-11

## 2021-02-11 NOTE — CARE COORDINATION
[facility] Transitions Follow Up Call    2021    Patient: Gorge Boyce  Patient : 1950   MRN: [id number]  Reason for Admission: STEMI  Discharge Date: 21 RARS: Readmission Risk Score: 11         Spoke with: Gorge Boyce    Patient answered call and verified . Patient pleasant and agreeable to transition call. Patient has been increasing his exercise and feeling good. Patient has stopped metoprolol per MD orders, but continues with carvedilol. Medication list noted to be updated. Patient is adjusting to medication and not feeling as tired. 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 Patient declines cardiac rehab at this time and MD is aware. Patient confirms that he is taking all medication as directed. Follow up appts scheduled. Denies any acute needs at present time. Educated on the use of urgent care or physicians 24 hr access line if assistance is needed after hours. Final call completed and episode closed    Care Transitions Subsequent and Final Call    Subsequent and Final Calls  Do you have any ongoing symptoms?: No  Have your medications changed?: No  Do you have any questions related to your medications?: No  Do you currently have any active services?: No  Do you have any needs or concerns that I can assist you with?: No  Identified Barriers: None  Care Transitions Interventions  Other Interventions:            Follow Up  Future Appointments   Date Time Provider Filippo Richard   2021  9:45 AM Noel MORFIN   2021 10:00 AM DO CHRISTIANO Sanders [facility]       Genevieve Waggoner RN

## 2021-03-15 ENCOUNTER — TELEPHONE (OUTPATIENT)
Dept: FAMILY MEDICINE CLINIC | Age: 71
End: 2021-03-15

## 2021-03-15 NOTE — TELEPHONE ENCOUNTER
Pt. Is asking if he can schedule an appointment with you to review his medications. Pt. Normally see's Dr. Juanita Pradhan but he is gone for the next 2 weeks. Pt. Prefers you because you listen and seem more attentative to his needs.

## 2021-03-17 ENCOUNTER — OFFICE VISIT (OUTPATIENT)
Dept: FAMILY MEDICINE CLINIC | Age: 71
End: 2021-03-17
Payer: MEDICARE

## 2021-03-17 VITALS
BODY MASS INDEX: 35.69 KG/M2 | HEART RATE: 74 BPM | OXYGEN SATURATION: 98 % | SYSTOLIC BLOOD PRESSURE: 140 MMHG | DIASTOLIC BLOOD PRESSURE: 60 MMHG | TEMPERATURE: 97.2 F | WEIGHT: 278 LBS

## 2021-03-17 DIAGNOSIS — T46.6X5A MYALGIA DUE TO STATIN: Primary | ICD-10-CM

## 2021-03-17 DIAGNOSIS — M79.10 MYALGIA DUE TO STATIN: Primary | ICD-10-CM

## 2021-03-17 PROCEDURE — 1123F ACP DISCUSS/DSCN MKR DOCD: CPT | Performed by: PHYSICIAN ASSISTANT

## 2021-03-17 PROCEDURE — 99213 OFFICE O/P EST LOW 20 MIN: CPT | Performed by: PHYSICIAN ASSISTANT

## 2021-03-17 PROCEDURE — 3017F COLORECTAL CA SCREEN DOC REV: CPT | Performed by: PHYSICIAN ASSISTANT

## 2021-03-17 PROCEDURE — G8484 FLU IMMUNIZE NO ADMIN: HCPCS | Performed by: PHYSICIAN ASSISTANT

## 2021-03-17 PROCEDURE — G8417 CALC BMI ABV UP PARAM F/U: HCPCS | Performed by: PHYSICIAN ASSISTANT

## 2021-03-17 PROCEDURE — 1036F TOBACCO NON-USER: CPT | Performed by: PHYSICIAN ASSISTANT

## 2021-03-17 PROCEDURE — 4040F PNEUMOC VAC/ADMIN/RCVD: CPT | Performed by: PHYSICIAN ASSISTANT

## 2021-03-17 PROCEDURE — G8427 DOCREV CUR MEDS BY ELIG CLIN: HCPCS | Performed by: PHYSICIAN ASSISTANT

## 2021-03-17 ASSESSMENT — PATIENT HEALTH QUESTIONNAIRE - PHQ9
2. FEELING DOWN, DEPRESSED OR HOPELESS: 0
SUM OF ALL RESPONSES TO PHQ QUESTIONS 1-9: 0
SUM OF ALL RESPONSES TO PHQ9 QUESTIONS 1 & 2: 0

## 2021-03-17 ASSESSMENT — ENCOUNTER SYMPTOMS: COLOR CHANGE: 0

## 2021-03-17 NOTE — PROGRESS NOTES
3/17/2021  Kingsley Delgadillo (: 1950)  78 y.o. HPI  Reports aching in shoulders, hips and knee joints. Denies any stiffness of the joints, erythema, swelling or heat. 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 The pt has a history of arthritis for which he was previously taking ibuprofen. Already taking tylenol arthritis. He reports that the pain started around the time his atorvastatin was increased from 20 to 80 mg. He denies any other changes to activity. Review of Systems   Constitutional: Negative for chills, diaphoresis, fever and unexpected weight change. Genitourinary: Negative for decreased urine volume. Musculoskeletal: Positive for arthralgias and myalgias. Negative for joint swelling. Skin: Negative for color change. Allergies, past medical history, family history, and social history reviewed and unchanged from previous encounter. Current Outpatient Medications   Medication Sig Dispense Refill    aspirin 81 MG chewable tablet Take 1 tablet by mouth daily 30 tablet 3    atorvastatin (LIPITOR) 80 MG tablet Take 1 tablet by mouth nightly 30 tablet 3    carvedilol (COREG) 3.125 MG tablet Take 1 tablet by mouth 2 times daily (with meals) 60 tablet 3    ticagrelor (BRILINTA) 90 MG TABS tablet Take 1 tablet by mouth 2 times daily 60 tablet 5    lisinopril-hydroCHLOROthiazide (PRINZIDE;ZESTORETIC) 20-25 MG per tablet TAKE ONE TABLET BY MOUTH DAILY 90 tablet 3     No current facility-administered medications for this visit. Vitals:    21 0955   BP: (!) 140/60   Pulse: 74   Temp: 97.2 °F (36.2 °C)   TempSrc: Temporal   SpO2: 98%   Weight: 278 lb (126.1 kg)     Estimated body mass index is 35.69 kg/m² as calculated from the following:    Height as of 2/3/21: 6' 2\" (1.88 m). Weight as of this encounter: 278 lb (126.1 kg). Physical Exam  Vitals signs reviewed. Constitutional:       Appearance: Normal appearance.    Cardiovascular: Rate and Rhythm: Normal rate and regular rhythm. Heart sounds: Normal heart sounds. Pulmonary:      Effort: Pulmonary effort is normal.      Breath sounds: Normal breath sounds. Musculoskeletal:      Cervical back: He exhibits tenderness. Comments: Tenderness with PROM, ROM intact   Neurological:      Mental Status: He is alert and oriented to person, place, and time. ASSESSMENT and PLAN:  Gabriella Lagos was seen today for arthritis and other. Diagnoses and all orders for this visit:    Myalgia due to statin  -   Cut statin down to 40 mg for two weeks to see if pain improves. We did discuss the benefits of the higher dosage of atorvastatin but his current quality of life is very poor. He would like to avoid additional medications if possible. Return if symptoms worsen or fail to improve.

## 2021-05-05 ASSESSMENT — ENCOUNTER SYMPTOMS
SORE THROAT: 0
COUGH: 0
BLOOD IN STOOL: 0
VOMITING: 0
DIARRHEA: 0
PHOTOPHOBIA: 0
NAUSEA: 0
EYE PAIN: 0
ABDOMINAL PAIN: 0
RHINORRHEA: 0
CONSTIPATION: 0

## 2021-05-05 NOTE — PROGRESS NOTES
[address] [facility]   Cardiovascular Evaluation    PATIENT: Iliana John  DATE: 2021  MRN: [id number]  [facility]: [id number]  : 1950      Primary Care Doctor: Marizol Linda DO  Reason for evaluation:   Follow-up for CAD    Subjective:     Kandace Coles. Jesus Briggs is a 80-year-old male with a history of CAD s/p inferior STEMI and PCI to mid-distal RCA with overlapping Xience Zoie 4.0 x 38 mm and 4.0 x 33 mm FADIA (tapered to final diameter of 4.8 mm) on 21, atrial flutter s/p RFCA on 18 (no longer on anticoagulation), paroxysmal atrial tachycardia, PVCs, essential hypertension, hyperlipidemia, and obesity who presents for follow-up. The patient was admitted to [facility] from -21 with an acute anterior STEMI for which he underwent PCI to the mid-distal RCA with overlapping Xience Zoie 4.0 x 38 mm and 4.0 x 33 mm FADIA, tapered to final diameter of 4.8 mm. Angiography at that time was also notable for a 40% eccentric proximal LAD stenosis and 30% proximal LCx stenosis. TTE obtained during his admission showed an LVEF of 50% with no wall motion abnormalities, grade 1 diastolic dysfunction, normal RV size and systolic function, and mild mitral and tricuspid regurgitation. Since his last visit on 2/3/21, the patient reports that he has generally been feeling well and feels like he is getting his strength back. He denies any chest pain, shortness of breath, palpitations, lower extremity edema, orthopnea, or paroxysmal nocturnal dyspnea. He sometimes notices some mild, non-limiting lightheadedness when he bends over. He still feels tired in the afternoon and says that often take a 1/2 hour nap. 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 He says that he also walks on his daughter's treadmill for 20-30 minutes from time to time.    He thinks that some of his new medications may be causing some fatigue and worsening joint aches/pains. He additionally reports that he has noticed some bright red blood on the toilet paper after wiping. He says that this has happened 3-4 times over the last 2 months. He has never had a colonoscopy. Patient Active Problem List   Diagnosis    Essential hypertension    Carotid stenosis, non-symptomatic    Primary osteoarthritis involving multiple joints    Family history of abdominal aortic aneurysm    Bilateral leg pain    Morbidly obese (HCC)    Leg swelling    Hyperlipidemia    Typical atrial flutter (HCC)    Paroxysmal atrial tachycardia (HCC)    Premature ventricular contraction    [facility]I involving right coronary artery [facility] [address]    STEMI (ST elevation myocardial infarction) ([facility] [address]    Hypokalemia    Fatigue    Shortness of breath         Past Medical History:  CAD, atrial flutter, paroxysmal atrial tachycardia, PVCs, hyperlipidemia, hypertension, obesity. Surgical History:   has a past surgical history that includes Appendectomy; Dental surgery; and Cardiac surgery (05/2018). Social History:   reports that he has never smoked. He has never used smokeless tobacco. He reports that he does not drink alcohol or use drugs. Family History:  Family history is notable for AAA in brother. Home Medications:  Reviewed and are listed in nursing record.  and/or listed below  Current Outpatient Medications   Medication Sig Dispense Refill    aspirin 81 MG chewable tablet Take 1 tablet by mouth daily 30 tablet 3    atorvastatin (LIPITOR) 80 MG tablet Take 1 tablet by mouth nightly 30 tablet 3    carvedilol (COREG) 3.125 MG tablet Take 1 tablet by mouth 2 times daily (with meals) 60 tablet 3    ticagrelor (BRILINTA) 90 MG TABS tablet Take 1 tablet by mouth 2 times daily 60 tablet 5    lisinopril-hydroCHLOROthiazide (PRINZIDE;ZESTORETIC) 20-25 MG per tablet TAKE ONE TABLET BY MOUTH DAILY 90 tablet 3     No current facility-administered medications for this visit. Allergies:  Patient has no known allergies. Review of Systems:   A 14 point review of symptoms completed. Pertinent positives identified in the HPI, all other review of symptoms negative as below. Review of Systems   Constitutional: Positive for fatigue. Negative for chills and fever. HENT: Negative for congestion, rhinorrhea and sore throat. Eyes: Negative for photophobia, pain and visual disturbance. Respiratory: Negative for cough and shortness of breath. Cardiovascular: Negative for chest pain, palpitations and leg swelling. Gastrointestinal: Negative for abdominal pain, blood in stool, constipation, diarrhea, nausea and vomiting. Positive for bright red blood per rectum. Endocrine: Negative for cold intolerance and heat intolerance. Genitourinary: Negative for difficulty urinating, dysuria and hematuria. Musculoskeletal: Positive for arthralgias and myalgias. Negative for joint swelling. Skin: Negative for rash and wound. Allergic/Immunologic: Negative for environmental allergies and food allergies. Neurological: Positive for light-headedness. Negative for syncope. Hematological: Negative for adenopathy. Does not bruise/bleed easily. Psychiatric/Behavioral: Negative for dysphoric mood. The patient is not nervous/anxious. Objective:   PHYSICAL EXAM:    Vitals:    05/06/21 0953   BP: 130/80   Pulse: 74   SpO2: 98%    Weight: 279 lb 8 oz (126.8 kg)     Wt Readings from Last 3 Encounters:   05/06/21 279 lb 8 oz (126.8 kg)   03/17/21 278 lb (126.1 kg)   02/03/21 273 lb 8 oz (124.1 kg)     General: Adult male in no acute distress. Pleasant and interactive on exam.  HEENT: Normocephalic, atraumatic, non-icteric, hearing intact, nares normal, mucous membranes moist.  Neck: No JVD. Heart: Regular rate and rhythm. Normal S1 and S2. No murmurs, gallops or rubs. Lungs: Normal respiratory effort. Clear to auscultation bilaterally.  No wheezes, rales, or rhonchi. Abdomen: Soft, non-tender. Normoactive bowel sounds. No masses or organomegaly. Skin: No rashes, wounds, or lesions. Pulses: 2+ and symmetric. Extremities: No clubbing, cyanosis, or edema. Psych: Normal mood and affect. Neuro: Alert and oriented to person, place, and time. No focal deficits noted. LABS   CBC:      Lab Results   Component Value Date    WBC 9.1 01/22/2021    RBC 4.32 01/22/2021    HGB 12.9 01/22/2021    HCT 39.1 01/22/2021    MCV 90.3 01/22/2021    RDW 13.9 01/22/2021     01/22/2021     CMP:  Lab Results   Component Value Date     01/22/2021    K 3.7 01/22/2021     01/22/2021    CO2 31 01/22/2021    BUN 24 01/22/2021    CREATININE 0.8 01/22/2021    GFRAA >60 01/22/2021    GFRAA >60 09/18/2012    AGRATIO 1.5 01/21/2021    LABGLOM >60 01/22/2021    GLUCOSE 129 01/22/2021    PROT 6.1 01/22/2021    PROT 6.7 09/18/2012    CALCIUM 9.1 01/22/2021    BILITOT 0.7 01/22/2021    ALKPHOS 50 01/22/2021     01/22/2021    ALT 46 01/22/2021     PT/INR:   No results found for: PTINR  Liver:  No components found for: CHLPL  Lab Results   Component Value Date    ALT 46 (H) 01/22/2021     (H) 01/22/2021    ALKPHOS 50 01/22/2021    BILITOT 0.7 01/22/2021     Lab Results   Component Value Date    LABA1C 6.3 01/21/2021     Lipids:         Lab Results   Component Value Date    TRIG 143 01/22/2021    TRIG 101 11/23/2020    TRIG 83 10/14/2019            Lab Results   Component Value Date    HDL 31 (L) 01/22/2021    HDL 38 (L) 11/23/2020    HDL 39 (L) 10/14/2019            Lab Results   Component Value Date    LDLCALC 70 01/22/2021    LDLCALC 101 (H) 11/23/2020    LDLCALC 84 10/14/2019            Lab Results   Component Value Date    LABVLDL 29 01/22/2021    LABVLDL 20 11/23/2020    LABVLDL 17 10/14/2019         CARDIAC DATA   LAST EKG 1/21/2021:  Normal sinus rhythm, evolving changes of inferior infarct.     ECHO:   TTE 1/22/2021:   Summary   Technically difficult study due to poor acoustic windows, definity is used   for myocardial border enhancement. Left ventricular systolic function is low normal with a visually estimated   ejection fraction of 50%. Grade I diastolic dysfunction with normal filling pressure. Mild mitral and tricuspid regurgitation. Systolic pulmonary artery pressure (SPAP) is normal and estimated at 24 mmHg   (right atrial pressure 8 mmHg). STRESS TEST: N/A    CARDIAC CATH:   [facility] 1/21/2021:  Dominance: Right      LM: normal   LAD: 40% eccentric proximal disease  LCx: 30% proximal stenosis   RCA: 100% mid occlusion     LVEDP: 28 mmHg  LVEF: 50-55%     6 Fr JR 4 Guide with BMW wire and guideliner extension      3.5 x 12 mm predil (see log)     4.0 x 38 mm Xience FADIA extending to distal right   4.0 x 33 mm Xience FADIA overlapping previous stent proximally to mid vessel     5.0 x 20 mm NC Trek postdil (from 4.6 mm distally within stented segment to 4.8 proximally)     0% residual   Large RPDA and RPLB with ARLYN III Flow      EP study and aflutter ablation 5/28/2018 (Dr. Delon Yang):  CONCLUSION:  1. Complex electrophysiology study with normal baseline conduction  parameters. 2.  Status post successful cavotricuspid isthmus ablation for  counterclockwise flutter.       VASCULAR/OTHER IMAGING:  Non-contrast chest CT 1/23/2021:  FINDINGS:   Mediastinum: The heart and great vessels are normal in size.  Calcified   atheromatous plaque and coronary calcifications are noted.  No pericardial   effusion.  No enlarged or suspicious-appearing lymph nodes are identified.       Lungs/pleura: Linear opacities in the medial lingula, medial right middle   lobe and lung bases are noted consistent with subsegmental atelectasis or   scarring.  Sequela of old granulomatous disease.  No consolidation.  No   evidence for edema.  No effusion.  The central airway is patent.       Upper Abdomen: Tiny hypoattenuating liver lesions are noted, which may   represent cysts.  No acute findings.       Soft Tissues/Bones: No acute osseous abnormality identified. Multilevel   degenerative change in the lower cervical and lower thoracic spine. Assessment:     1. CAD - S/p acute inferior STEMI and PCI to mid-distal RCA with overlapping Xience Zoie 4.0 x 38 mm and 4.0 x 33 mm FADIA, tapered to final diameter of 4.8 mm on 1/21/21. TTE from that time showed preserved biventricular systolic function. Continues to deny angina and reports good exercise tolerance. 2. Fatigue - Overall, seems to be improving. Likely secondary to deconditioning following MI.  TSH was WNL. 3. Arthralgias/myalgias - Has known underlying osteoarthritis, but thinks symptoms may have gotten somewhat worse since starting atorvastatin. 4. Atrial flutter - S/p RFCA in 2018. No longer on anticoagulation. Follows with EP. 5. Paroxysmal atrial tachycardia  6. PVCs  7. Mild mitral regurgitation  8. Mild tricuspid regurgitation  8. Essential hypertension  9. Hyperlipidemia  10. Obesity  11. BRBPR    Plan:     1. Given concern for worsening myalgias/arthralgias since starting atorvastatin, will switch to rosuvastatin 20 mg daily and see if this is better tolerated. 2. Continue dual antiplatelet therapy with aspirin 81 mg daily and Brilinta 90 mg BID. Brilinta should be continued through at least 1/21/22. 3. Continue coreg 3.125 mg BID and lisinopril-HCTZ 20-25 mg daily. 4. Given reports of recent BRBPR, will refer to GI for colonoscopy. Will also check CBC and iron studies. 5. 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 6. Continued to encourage heart healthy, low sodium diet and regular physical exercise for at least 30 minutes 3-5 times per week. 7. Follow-up in 6 months. This note was scribed in the presence of Dr. Chu Egan DO by Eugenia Fournier RN.          It is a pleasure to assist in the care of Mj SCHUSTERLuly Elie. Please call with any questions. The scribes documentation has been prepared under my direction and personally reviewed by me in its entirety. I confirm that the note above accurately reflects all work, treatment, procedures, and medical decision making performed by me. I, Dr. Jean-Claude Schultz, personally performed the services described in this documentation as scribed by David Medina RN in my presence, and it is both accurate and complete to the best of our ability.        Jean-Claude Schultz, [address] [facility]  [phone] [facility]  [phone] [address] [place] [address]

## 2021-05-06 ENCOUNTER — HOSPITAL ENCOUNTER (OUTPATIENT)
Age: 71
Discharge: HOME | End: 2021-05-06
Payer: MEDICARE

## 2021-05-06 ENCOUNTER — OFFICE VISIT (OUTPATIENT)
Dept: CARDIOLOGY CLINIC | Age: 71
End: 2021-05-06
Payer: MEDICARE

## 2021-05-06 VITALS
HEART RATE: 74 BPM | BODY MASS INDEX: 35.87 KG/M2 | WEIGHT: 279.5 LBS | HEIGHT: 74 IN | SYSTOLIC BLOOD PRESSURE: 130 MMHG | DIASTOLIC BLOOD PRESSURE: 80 MMHG | OXYGEN SATURATION: 98 %

## 2021-05-06 DIAGNOSIS — K62.5 RECTAL BLEEDING: ICD-10-CM

## 2021-05-06 DIAGNOSIS — R53.83 FATIGUE, UNSPECIFIED TYPE: ICD-10-CM

## 2021-05-06 DIAGNOSIS — I34.0 MILD MITRAL REGURGITATION: ICD-10-CM

## 2021-05-06 DIAGNOSIS — I21.11 STEMI INVOLVING RIGHT CORONARY ARTERY (HCC): Primary | ICD-10-CM

## 2021-05-06 DIAGNOSIS — E78.2 MIXED HYPERLIPIDEMIA: ICD-10-CM

## 2021-05-06 DIAGNOSIS — I10 ESSENTIAL HYPERTENSION: ICD-10-CM

## 2021-05-06 DIAGNOSIS — M25.50 ARTHRALGIA, UNSPECIFIED JOINT: ICD-10-CM

## 2021-05-06 DIAGNOSIS — I21.11 STEMI INVOLVING RIGHT CORONARY ARTERY (HCC): ICD-10-CM

## 2021-05-06 DIAGNOSIS — M79.10 MYALGIA: ICD-10-CM

## 2021-05-06 DIAGNOSIS — I47.1 PAROXYSMAL ATRIAL TACHYCARDIA (HCC): ICD-10-CM

## 2021-05-06 DIAGNOSIS — I48.3 TYPICAL ATRIAL FLUTTER (HCC): ICD-10-CM

## 2021-05-06 DIAGNOSIS — I25.10 CORONARY ARTERY DISEASE INVOLVING NATIVE CORONARY ARTERY OF NATIVE HEART WITHOUT ANGINA PECTORIS: ICD-10-CM

## 2021-05-06 LAB
BASOPHILS ABSOLUTE: 0 K/UL (ref 0–0.2)
BASOPHILS RELATIVE PERCENT: 0.6 %
EOSINOPHILS ABSOLUTE: 0.2 K/UL (ref 0–0.6)
EOSINOPHILS RELATIVE PERCENT: 2.7 %
HCT VFR BLD CALC: 43.9 % (ref 40.5–52.5)
HEMOGLOBIN: 14.7 G/DL (ref 13.5–17.5)
IRON SATURATION: 22 % (ref 20–50)
IRON: 64 UG/DL (ref 59–158)
LYMPHOCYTES ABSOLUTE: 1.6 K/UL (ref 1–5.1)
LYMPHOCYTES RELATIVE PERCENT: 21.1 %
MCH RBC QN AUTO: 29.8 PG (ref 26–34)
MCHC RBC AUTO-ENTMCNC: 33.4 G/DL (ref 31–36)
MCV RBC AUTO: 89.3 FL (ref 80–100)
MONOCYTES ABSOLUTE: 0.5 K/UL (ref 0–1.3)
MONOCYTES RELATIVE PERCENT: 7.2 %
NEUTROPHILS ABSOLUTE: 5 K/UL (ref 1.7–7.7)
NEUTROPHILS RELATIVE PERCENT: 68.4 %
PDW BLD-RTO: 13.6 % (ref 12.4–15.4)
PLATELET # BLD: 205 K/UL (ref 135–450)
PMV BLD AUTO: 7.8 FL (ref 5–10.5)
RBC # BLD: 4.92 M/UL (ref 4.2–5.9)
TOTAL IRON BINDING CAPACITY: 286 UG/DL (ref 260–445)
WBC # BLD: 7.4 K/UL (ref 4–11)

## 2021-05-06 PROCEDURE — G8427 DOCREV CUR MEDS BY ELIG CLIN: HCPCS | Performed by: INTERNAL MEDICINE

## 2021-05-06 PROCEDURE — 1123F ACP DISCUSS/DSCN MKR DOCD: CPT | Performed by: INTERNAL MEDICINE

## 2021-05-06 PROCEDURE — 3017F COLORECTAL CA SCREEN DOC REV: CPT | Performed by: INTERNAL MEDICINE

## 2021-05-06 PROCEDURE — 83540 ASSAY OF IRON: CPT

## 2021-05-06 PROCEDURE — 36415 COLL VENOUS BLD VENIPUNCTURE: CPT

## 2021-05-06 PROCEDURE — 83550 IRON BINDING TEST: CPT

## 2021-05-06 PROCEDURE — 4040F PNEUMOC VAC/ADMIN/RCVD: CPT | Performed by: INTERNAL MEDICINE

## 2021-05-06 PROCEDURE — 85025 COMPLETE CBC W/AUTO DIFF WBC: CPT

## 2021-05-06 PROCEDURE — 99214 OFFICE O/P EST MOD 30 MIN: CPT | Performed by: INTERNAL MEDICINE

## 2021-05-06 PROCEDURE — 1036F TOBACCO NON-USER: CPT | Performed by: INTERNAL MEDICINE

## 2021-05-06 PROCEDURE — G8417 CALC BMI ABV UP PARAM F/U: HCPCS | Performed by: INTERNAL MEDICINE

## 2021-05-06 RX ORDER — ROSUVASTATIN CALCIUM 20 MG/1
20 TABLET, COATED ORAL NIGHTLY
Qty: 30 TABLET | Refills: 5 | Status: SHIPPED | OUTPATIENT
Start: 2021-05-06 | End: 2021-11-16 | Stop reason: SDUPTHER

## 2021-05-06 RX ORDER — CARVEDILOL 3.12 MG/1
3.12 TABLET ORAL 2 TIMES DAILY WITH MEALS
Qty: 180 TABLET | Refills: 3 | Status: SHIPPED | OUTPATIENT
Start: 2021-05-06 | End: 2021-12-15 | Stop reason: SDUPTHER

## 2021-05-06 ASSESSMENT — ENCOUNTER SYMPTOMS: SHORTNESS OF BREATH: 0

## 2021-05-06 NOTE — LETTER
[address] [facility]   Cardiovascular Evaluation    PATIENT: Raysa Elena  DATE: 2021  MRN: [id number]  [facility]: [id number]  : 1950      Primary Care Doctor: Taz Camilo DO  Reason for evaluation:   Follow-up for CAD    Subjective:     Chiquis Church. Terrence Bradley is a 59-year-old male with a history of CAD s/p inferior STEMI and PCI to mid-distal RCA with overlapping Xience Zoie 4.0 x 38 mm and 4.0 x 33 mm FADIA (tapered to final diameter of 4.8 mm) on 21, atrial flutter s/p RFCA on 18 (no longer on anticoagulation), paroxysmal atrial tachycardia, PVCs, essential hypertension, hyperlipidemia, and obesity who presents for follow-up. The patient was admitted to [facility] from -21 with an acute anterior STEMI for which he underwent PCI to the mid-distal RCA with overlapping Xience Zoie 4.0 x 38 mm and 4.0 x 33 mm FADIA, tapered to final diameter of 4.8 mm. Angiography at that time was also notable for a 40% eccentric proximal LAD stenosis and 30% proximal LCx stenosis. TTE obtained during his admission showed an LVEF of 50% with no wall motion abnormalities, grade 1 diastolic dysfunction, normal RV size and systolic function, and mild mitral and tricuspid regurgitation. Since his last visit on 2/3/21, the patient reports that he has generally been feeling well and feels like he is getting his strength back. He denies any chest pain, shortness of breath, palpitations, lower extremity edema, orthopnea, or paroxysmal nocturnal dyspnea. He sometimes notices some mild, non-limiting lightheadedness when he bends over. He still feels tired in the afternoon and says that often take a 1/2 hour nap. 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 He says that he also walks on his daughter's treadmill for 20-30 minutes from time to time.    He thinks that some of his new medications may be causing some fatigue and worsening joint aches/pains. He additionally reports that he has noticed some bright red blood on the toilet paper after wiping. He says that this has happened 3-4 times over the last 2 months. He has never had a colonoscopy. Patient Active Problem List   Diagnosis    Essential hypertension    Carotid stenosis, non-symptomatic    Primary osteoarthritis involving multiple joints    Family history of abdominal aortic aneurysm    Bilateral leg pain    Morbidly obese (HCC)    Leg swelling    Hyperlipidemia    Typical atrial flutter (HCC)    Paroxysmal atrial tachycardia (HCC)    Premature ventricular contraction    STEMI involving right coronary artery ([facility] [address]    STEMI (ST elevation myocardial infarction) ([facility] [address]    Hypokalemia    Fatigue    Shortness of breath         Past Medical History:  CAD, atrial flutter, paroxysmal atrial tachycardia, PVCs, hyperlipidemia, hypertension, obesity. Surgical History:   has a past surgical history that includes Appendectomy; Dental surgery; and Cardiac surgery (05/2018). Social History:   reports that he has never smoked. He has never used smokeless tobacco. He reports that he does not drink alcohol or use drugs. Family History:  Family history is notable for AAA in brother. Home Medications:  Reviewed and are listed in nursing record.  and/or listed below  Current Outpatient Medications   Medication Sig Dispense Refill    aspirin 81 MG chewable tablet Take 1 tablet by mouth daily 30 tablet 3    atorvastatin (LIPITOR) 80 MG tablet Take 1 tablet by mouth nightly 30 tablet 3    carvedilol (COREG) 3.125 MG tablet Take 1 tablet by mouth 2 times daily (with meals) 60 tablet 3    ticagrelor (BRILINTA) 90 MG TABS tablet Take 1 tablet by mouth 2 times daily 60 tablet 5    lisinopril-hydroCHLOROthiazide (PRINZIDE;ZESTORETIC) 20-25 MG per tablet TAKE ONE TABLET BY MOUTH DAILY 90 tablet 3     No current facility-administered medications for this acoustic windows, definity is used   for myocardial border enhancement. Left ventricular systolic function is low normal with a visually estimated   ejection fraction of 50%. Grade I diastolic dysfunction with normal filling pressure. Mild mitral and tricuspid regurgitation. Systolic pulmonary artery pressure (SPAP) is normal and estimated at 24 mmHg   (right atrial pressure 8 mmHg). STRESS TEST: N/A    CARDIAC CATH:   [facility] 1/21/2021:  Dominance: Right      LM: normal   LAD: 40% eccentric proximal disease  LCx: 30% proximal stenosis   RCA: 100% mid occlusion     LVEDP: 28 mmHg  LVEF: 50-55%     6 Fr JR 4 Guide with BMW wire and guideliner extension      3.5 x 12 mm predil (see log)     4.0 x 38 mm Xience FADIA extending to distal right   4.0 x 33 mm Xience FADIA overlapping previous stent proximally to mid vessel     5.0 x 20 mm NC Trek postdil (from 4.6 mm distally within stented segment to 4.8 proximally)     0% residual   Large RPDA and RPLB with ARLYN III Flow      EP study and aflutter ablation 5/28/2018 (Dr. Adelina Hill):  CONCLUSION:  1. Complex electrophysiology study with normal baseline conduction  parameters. 2.  Status post successful cavotricuspid isthmus ablation for  counterclockwise flutter.       VASCULAR/OTHER IMAGING:  Non-contrast chest CT 1/23/2021:  FINDINGS:   Mediastinum: The heart and great vessels are normal in size.  Calcified   atheromatous plaque and coronary calcifications are noted.  No pericardial   effusion.  No enlarged or suspicious-appearing lymph nodes are identified.       Lungs/pleura: Linear opacities in the medial lingula, medial right middle   lobe and lung bases are noted consistent with subsegmental atelectasis or   scarring.  Sequela of old granulomatous disease.  No consolidation.  No   evidence for edema.  No effusion.  The central airway is patent.       Upper Abdomen: Tiny hypoattenuating liver lesions are noted, which may   represent cysts.  No acute findings.     Soft Tissues/Bones: No acute osseous abnormality identified. Multilevel   degenerative change in the lower cervical and lower thoracic spine. Assessment:     1. CAD - S/p acute inferior STEMI and PCI to mid-distal RCA with overlapping Xience Zoie 4.0 x 38 mm and 4.0 x 33 mm FADIA, tapered to final diameter of 4.8 mm on 1/21/21. TTE from that time showed preserved biventricular systolic function. Continues to deny angina and reports good exercise tolerance. 2. Fatigue - Overall, seems to be improving. Likely secondary to deconditioning following MI.  TSH was WNL. 3. Arthralgias/myalgias - Has known underlying osteoarthritis, but thinks symptoms may have gotten somewhat worse since starting atorvastatin. 4. Atrial flutter - S/p RFCA in 2018. No longer on anticoagulation. Follows with EP. 5. Paroxysmal atrial tachycardia  6. PVCs  7. Mild mitral regurgitation  8. Mild tricuspid regurgitation  8. Essential hypertension  9. Hyperlipidemia  10. Obesity  11. BRBPR    Plan:     1. Given concern for worsening myalgias/arthralgias since starting atorvastatin, will switch to rosuvastatin 20 mg daily and see if this is better tolerated. 2. Continue dual antiplatelet therapy with aspirin 81 mg daily and Brilinta 90 mg BID. Brilinta should be continued through at least 1/21/22. 3. Continue coreg 3.125 mg BID and lisinopril-HCTZ 20-25 mg daily. 4. Given reports of recent BRBPR, will refer to GI for colonoscopy. Will also check CBC and iron studies. 5. 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 6. Continued to encourage heart healthy, low sodium diet and regular physical exercise for at least 30 minutes 3-5 times per week. 7. Follow-up in 6 months. This note was scribed in the presence of Dr. Matteo Cuba DO by Katarina Zavaleta RN. It is a pleasure to assist in the care of Mj SCHUSTERLuly Ramonloni.  Please call with any questions. The scribes documentation has been prepared under my direction and personally reviewed by me in its entirety. I confirm that the note above accurately reflects all work, treatment, procedures, and medical decision making performed by me. I, Dr. Torsten Sethi, personally performed the services described in this documentation as scribed by Eugenia Fournier RN in my presence, and it is both accurate and complete to the best of our ability.        Torsten Sethi, [address] [facility]  [phone] [facility]  [phone] [facility]

## 2021-05-07 ENCOUNTER — TELEPHONE (OUTPATIENT)
Dept: CARDIOLOGY CLINIC | Age: 71
End: 2021-05-07

## 2021-05-07 DIAGNOSIS — R53.83 FATIGUE, UNSPECIFIED TYPE: ICD-10-CM

## 2021-05-07 DIAGNOSIS — K62.5 RECTAL BLEEDING: ICD-10-CM

## 2021-05-07 LAB — FERRITIN: 151.4 NG/ML (ref 30–400)

## 2021-05-10 ENCOUNTER — TELEPHONE (OUTPATIENT)
Dept: CARDIOLOGY CLINIC | Age: 71
End: 2021-05-10

## 2021-05-10 NOTE — TELEPHONE ENCOUNTER
----- Message from Mary Yepez DO sent at 5/10/2021  1:08 PM EDT -----  Please let patient know that his hemoglobin is stable and his iron studies were normal.  He has been referred to GI for further evaluation of his BRBPR.

## 2021-11-16 RX ORDER — ROSUVASTATIN CALCIUM 20 MG/1
20 TABLET, COATED ORAL NIGHTLY
Qty: 30 TABLET | Refills: 5 | Status: SHIPPED | OUTPATIENT
Start: 2021-11-16 | End: 2022-05-24

## 2021-11-16 NOTE — TELEPHONE ENCOUNTER
Pt is requesting a refill on Crestor 20 mg. Preferred pharmacy is [facility] in [place] @ [phone]. Last OV 5/6/2021 with [facility]. Upcoming OV 12/15/2021 with [facility].

## 2021-11-16 NOTE — TELEPHONE ENCOUNTER
Last OV 05/06/21 [facility]  Lipid 01/22/21  LFT 01/22/21  Upcoming OV 12/15/21 [facility]  Medication pended for sign off

## 2021-12-14 ASSESSMENT — ENCOUNTER SYMPTOMS
BLOOD IN STOOL: 0
RHINORRHEA: 0
SORE THROAT: 0
CONSTIPATION: 0
VOMITING: 0
PHOTOPHOBIA: 0
ABDOMINAL PAIN: 0
COUGH: 0
DIARRHEA: 0
EYE PAIN: 0
NAUSEA: 0

## 2021-12-14 NOTE — PROGRESS NOTES
[address] [facility]   Cardiovascular Evaluation    PATIENT: Edilberto Contreras  DATE: 12/15/2021  MRN: [id number]  CSN: [id number]  : 1950      Primary Care Doctor: Chelsie Root DO  Reason for evaluation:   Follow-up for CAD    Subjective:     Lisset Chen. Manda Ibanez is a 81-year-old male with a history of CAD s/p inferior STEMI and PCI to mid-distal RCA with overlapping Xience Zoie 4.0 x 38 mm and 4.0 x 33 mm FADIA (tapered to final diameter of 4.8 mm) on 21, atrial flutter s/p RFCA on 18 (no longer on anticoagulation), paroxysmal atrial tachycardia, PVCs, essential hypertension, hyperlipidemia, and obesity who presents for follow-up. The patient was admitted to [facility] from -21 with an acute anterior STEMI for which he underwent PCI to the mid-distal RCA with overlapping Xience Zoie 4.0 x 38 mm and 4.0 x 33 mm FADIA, tapered to final diameter of 4.8 mm. Angiography at that time was also notable for a 40% eccentric proximal LAD stenosis and 30% proximal LCx stenosis. TTE obtained during his admission showed an LVEF of 50% with no wall motion abnormalities, grade 1 diastolic dysfunction, normal RV size and systolic function, and mild mitral and tricuspid regurgitation. Since his last clinic visit on 21, the patient reports that he has been doing well from a cardiovascular standpoint. He notes some mild shortness of breath with exertion but that this has not been overly bothersome. His myalgias improved after switching from atorvastatin to rosuvastatin. He denies any chest, palpitations, lower extremity edema, orthopnea, or paroxysmal nocturnal dyspnea. 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 His BRBPR seems to have resolved. Hemoglobin and iron studies were WNL. He was previously referred to GI, but says that he was told that it would take 3 months to get him in for a visit and since his symptoms resolved, he says he never followed up. Patient Active Problem List   Diagnosis    Essential hypertension    Carotid stenosis, non-symptomatic    Primary osteoarthritis involving multiple joints    Family history of abdominal aortic aneurysm    Bilateral leg pain    Morbidly obese (HCC)    Leg swelling    Hyperlipidemia    Typical atrial flutter (HCC)    Paroxysmal atrial tachycardia (HCC)    Premature ventricular contraction    STEMI involving right coronary artery [facility] [address]    STEMI (ST elevation myocardial infarction) ([facility] [address]    Hypokalemia    Fatigue    Shortness of breath    Rectal bleeding         Past Medical History:  CAD, atrial flutter, paroxysmal atrial tachycardia, PVCs, hyperlipidemia, hypertension, obesity. Surgical History:   has a past surgical history that includes Appendectomy; Dental surgery; and Cardiac surgery (05/2018). Social History:   reports that he has never smoked. He has never used smokeless tobacco. He reports that he does not drink alcohol and does not use drugs. Family History:  Family history is notable for AAA in brother. Home Medications:  Reviewed and are listed in nursing record. and/or listed below  Current Outpatient Medications   Medication Sig Dispense Refill    rosuvastatin (CRESTOR) 20 MG tablet Take 1 tablet by mouth nightly 30 tablet 5    carvedilol (COREG) 3.125 MG tablet Take 1 tablet by mouth 2 times daily (with meals) 180 tablet 3    ticagrelor (BRILINTA) 90 MG TABS tablet Take 1 tablet by mouth 2 times daily 180 tablet 3    aspirin 81 MG chewable tablet Take 1 tablet by mouth daily 30 tablet 3    lisinopril-hydroCHLOROthiazide (PRINZIDE;ZESTORETIC) 20-25 MG per tablet TAKE ONE TABLET BY MOUTH DAILY 90 tablet 3     No current facility-administered medications for this visit. Allergies:  Patient has no known allergies. Review of Systems:   A 14 point review of symptoms completed.  Pertinent positives identified in the HPI, all other review of symptoms negative as below. Review of Systems   Constitutional: Negative for chills and fever. HENT: Negative for congestion, rhinorrhea and sore throat. Eyes: Negative for photophobia, pain and visual disturbance. Respiratory: Positive for shortness of breath. Negative for cough. Cardiovascular: Negative for chest pain, palpitations and leg swelling. Gastrointestinal: Negative for abdominal pain, blood in stool, constipation, diarrhea, nausea and vomiting. Endocrine: Negative for cold intolerance and heat intolerance. Genitourinary: Negative for difficulty urinating, dysuria and hematuria. Musculoskeletal: Negative for arthralgias, joint swelling and myalgias. Skin: Negative for rash and wound. Allergic/Immunologic: Negative for environmental allergies and food allergies. Neurological: Negative for dizziness, syncope and light-headedness. Hematological: Negative for adenopathy. Does not bruise/bleed easily. Psychiatric/Behavioral: Negative for dysphoric mood. The patient is not nervous/anxious. Objective:   PHYSICAL EXAM:    Vitals:    12/15/21 1434   BP: 134/82   Pulse: 51   SpO2: 97%    Weight: 277 lb (125.6 kg)     Wt Readings from Last 3 Encounters:   12/15/21 277 lb (125.6 kg)   05/06/21 279 lb 8 oz (126.8 kg)   03/17/21 278 lb (126.1 kg)     General: Adult male in no acute distress. Pleasant and interactive on exam.  HEENT: Normocephalic, atraumatic, non-icteric, hearing intact, nares normal, mucous membranes moist.  Neck: No JVD. Heart: Regular rate and rhythm. Normal S1 and S2. No murmurs, gallops or rubs. Lungs: Normal respiratory effort. Clear to auscultation bilaterally. No wheezes, rales, or rhonchi. Abdomen: Soft, non-tender. Normoactive bowel sounds. No masses or organomegaly. Skin: No rashes, wounds, or lesions. Pulses: 2+ and symmetric. Extremities: No clubbing, cyanosis, or edema. Psych: Normal mood and affect. Neuro: Alert and oriented to person, place, and time.  No focal deficits noted. LABS   CBC:      Lab Results   Component Value Date    WBC 7.4 05/06/2021    RBC 4.92 05/06/2021    HGB 14.7 05/06/2021    HCT 43.9 05/06/2021    MCV 89.3 05/06/2021    RDW 13.6 05/06/2021     05/06/2021     CMP:  Lab Results   Component Value Date     01/22/2021    K 3.7 01/22/2021     01/22/2021    CO2 31 01/22/2021    BUN 24 01/22/2021    CREATININE 0.8 01/22/2021    GFRAA >60 01/22/2021    GFRAA >60 09/18/2012    AGRATIO 1.5 01/21/2021    LABGLOM >60 01/22/2021    GLUCOSE 129 01/22/2021    PROT 6.1 01/22/2021    PROT 6.7 09/18/2012    CALCIUM 9.1 01/22/2021    BILITOT 0.7 01/22/2021    ALKPHOS 50 01/22/2021     01/22/2021    ALT 46 01/22/2021     PT/INR:   No results found for: PTINR  Liver:  No components found for: CHLPL  Lab Results   Component Value Date    ALT 46 (H) 01/22/2021     (H) 01/22/2021    ALKPHOS 50 01/22/2021    BILITOT 0.7 01/22/2021     Lab Results   Component Value Date    LABA1C 6.3 01/21/2021     Lipids:         Lab Results   Component Value Date    TRIG 143 01/22/2021    TRIG 101 11/23/2020    TRIG 83 10/14/2019            Lab Results   Component Value Date    HDL 31 (L) 01/22/2021    HDL 38 (L) 11/23/2020    HDL 39 (L) 10/14/2019            Lab Results   Component Value Date    LDLCALC 70 01/22/2021    LDLCALC 101 (H) 11/23/2020    LDLCALC 84 10/14/2019            Lab Results   Component Value Date    LABVLDL 29 01/22/2021    LABVLDL 20 11/23/2020    LABVLDL 17 10/14/2019         CARDIAC DATA     ECHO:   TTE 1/22/2021:   Summary   Technically difficult study due to poor acoustic windows, definity is used   for myocardial border enhancement. Left ventricular systolic function is low normal with a visually estimated   ejection fraction of 50%. Grade I diastolic dysfunction with normal filling pressure. Mild mitral and tricuspid regurgitation.    Systolic pulmonary artery pressure (SPAP) is normal and estimated at 24 mmHg   (right preserved biventricular systolic function. Denies recent angina. 2. Arthralgias/myalgias - May have been exacerbated by statin. Overall, he reports significant improvement in symptoms after switching from atorvastatin to rosuvastatin. 3. Atrial flutter - S/p RFCA in 2018. No longer on anticoagulation. Follows with EP. 4. BRBPR - Seems to have resolved. Hemoglobin and iron studies were WNL. He was previously referred to GI, but was told that it would take 3 months to get him evaluated and ultimately never ended up following up. 5. Paroxysmal atrial tachycardia  6. PVCs  7. Mild mitral regurgitation  8. Mild tricuspid regurgitation  8. Essential hypertension  9. Hyperlipidemia  10. Obesity    Plan:     1. Overall, patient remains stable from a cardiovascular standpoint. 2. Will continue dual antiplatelet therapy with aspirin 81 mg daily and Brilinta 90 mg BID. Brilinta can be stopped after 1/21/22. 3. Continue rosuvastatin 20 mg qHS, coreg 3.125 mg BID, and lisinopril-HCTZ 20-25 mg daily. 4. If he has any recurrent BRBPR, he was strongly encouraged to follow-up with GI for further evaluation. 5. Continued to encourage heart healthy, low sodium diet and regular physical exercise for at least 30 minutes a minimum of 3-5 times per week. 6. Follow-up with me in 6 months. Scribe's attestation: This note was scribed in the presence of Dr. Heather Dominguez DO  by Eric Solorio LPN       It is a pleasure to assist in the care of Mj Delgadillo. Please call with any questions. The scribes documentation has been prepared under my direction and personally reviewed by me in its entirety. I confirm that the note above accurately reflects all work, treatment, procedures, and medical decision making performed by me.   I, Dr. Heather Dominguez, personally performed the services described in this documentation as scribed by Eric Solorio LPN   in my presence, and it is both accurate and complete to the best of our ability.        Yonatan Bryant, [facility]  [phone] [facility]  [phone] [address] [place] [address]

## 2021-12-15 ENCOUNTER — OFFICE VISIT (OUTPATIENT)
Dept: CARDIOLOGY CLINIC | Age: 71
End: 2021-12-15
Payer: MEDICARE

## 2021-12-15 VITALS
HEART RATE: 51 BPM | DIASTOLIC BLOOD PRESSURE: 82 MMHG | SYSTOLIC BLOOD PRESSURE: 134 MMHG | WEIGHT: 277 LBS | HEIGHT: 74 IN | BODY MASS INDEX: 35.55 KG/M2 | OXYGEN SATURATION: 97 %

## 2021-12-15 DIAGNOSIS — E66.01 MORBID OBESITY (HCC): ICD-10-CM

## 2021-12-15 DIAGNOSIS — I21.11 STEMI INVOLVING RIGHT CORONARY ARTERY (HCC): Primary | ICD-10-CM

## 2021-12-15 DIAGNOSIS — I10 ESSENTIAL HYPERTENSION: ICD-10-CM

## 2021-12-15 DIAGNOSIS — I25.10 CORONARY ARTERY DISEASE INVOLVING NATIVE CORONARY ARTERY OF NATIVE HEART WITHOUT ANGINA PECTORIS: ICD-10-CM

## 2021-12-15 DIAGNOSIS — I34.0 MILD MITRAL REGURGITATION: ICD-10-CM

## 2021-12-15 DIAGNOSIS — I47.1 PAROXYSMAL ATRIAL TACHYCARDIA (HCC): ICD-10-CM

## 2021-12-15 DIAGNOSIS — E78.2 MIXED HYPERLIPIDEMIA: ICD-10-CM

## 2021-12-15 DIAGNOSIS — I49.3 PREMATURE VENTRICULAR CONTRACTION: ICD-10-CM

## 2021-12-15 DIAGNOSIS — I48.3 TYPICAL ATRIAL FLUTTER (HCC): ICD-10-CM

## 2021-12-15 PROCEDURE — 1123F ACP DISCUSS/DSCN MKR DOCD: CPT | Performed by: INTERNAL MEDICINE

## 2021-12-15 PROCEDURE — 1036F TOBACCO NON-USER: CPT | Performed by: INTERNAL MEDICINE

## 2021-12-15 PROCEDURE — G8427 DOCREV CUR MEDS BY ELIG CLIN: HCPCS | Performed by: INTERNAL MEDICINE

## 2021-12-15 PROCEDURE — 99214 OFFICE O/P EST MOD 30 MIN: CPT | Performed by: INTERNAL MEDICINE

## 2021-12-15 PROCEDURE — G8417 CALC BMI ABV UP PARAM F/U: HCPCS | Performed by: INTERNAL MEDICINE

## 2021-12-15 PROCEDURE — G8484 FLU IMMUNIZE NO ADMIN: HCPCS | Performed by: INTERNAL MEDICINE

## 2021-12-15 PROCEDURE — 4040F PNEUMOC VAC/ADMIN/RCVD: CPT | Performed by: INTERNAL MEDICINE

## 2021-12-15 PROCEDURE — 3017F COLORECTAL CA SCREEN DOC REV: CPT | Performed by: INTERNAL MEDICINE

## 2021-12-15 NOTE — PATIENT INSTRUCTIONS
1. STOP taking Brilinta Jan 21st-post year annabelle of heart attack  2. Continue to eat heart healthy diet, continue to stay active. 3. Continue taking aspirin 81 mg daily along with your other medications  4. Follow up with me in 6 months.

## 2021-12-18 RX ORDER — LISINOPRIL AND HYDROCHLOROTHIAZIDE 25; 20 MG/1; MG/1
TABLET ORAL
Qty: 90 TABLET | Refills: 3 | Status: SHIPPED | OUTPATIENT
Start: 2021-12-18

## 2021-12-18 RX ORDER — CARVEDILOL 3.12 MG/1
3.12 TABLET ORAL 2 TIMES DAILY WITH MEALS
Qty: 180 TABLET | Refills: 3 | Status: SHIPPED | OUTPATIENT
Start: 2021-12-18

## 2021-12-18 ASSESSMENT — ENCOUNTER SYMPTOMS: SHORTNESS OF BREATH: 1

## 2022-05-10 ENCOUNTER — TELEPHONE (OUTPATIENT)
Dept: FAMILY MEDICINE CLINIC | Age: 72
End: 2022-05-10

## 2022-05-10 DIAGNOSIS — Z13.21 ENCOUNTER FOR VITAMIN DEFICIENCY SCREENING: ICD-10-CM

## 2022-05-10 DIAGNOSIS — R73.9 HYPERGLYCEMIA: Primary | ICD-10-CM

## 2022-05-10 DIAGNOSIS — E78.2 MIXED HYPERLIPIDEMIA: ICD-10-CM

## 2022-05-10 DIAGNOSIS — I10 ESSENTIAL HYPERTENSION: ICD-10-CM

## 2022-05-10 DIAGNOSIS — Z12.5 SPECIAL SCREENING FOR MALIGNANT NEOPLASM OF PROSTATE: ICD-10-CM

## 2022-05-19 DIAGNOSIS — Z13.21 ENCOUNTER FOR VITAMIN DEFICIENCY SCREENING: ICD-10-CM

## 2022-05-19 DIAGNOSIS — R73.9 HYPERGLYCEMIA: ICD-10-CM

## 2022-05-19 DIAGNOSIS — Z12.5 SPECIAL SCREENING FOR MALIGNANT NEOPLASM OF PROSTATE: ICD-10-CM

## 2022-05-19 DIAGNOSIS — E78.2 MIXED HYPERLIPIDEMIA: ICD-10-CM

## 2022-05-19 DIAGNOSIS — I10 ESSENTIAL HYPERTENSION: ICD-10-CM

## 2022-05-19 LAB
A/G RATIO: 1.8 (ref 1.1–2.2)
ALBUMIN SERPL-MCNC: 4.5 G/DL (ref 3.4–5)
ALP BLD-CCNC: 55 U/L (ref 40–129)
ALT SERPL-CCNC: 14 U/L (ref 10–40)
ANION GAP SERPL CALCULATED.3IONS-SCNC: 11 MMOL/L (ref 3–16)
AST SERPL-CCNC: 14 U/L (ref 15–37)
BASOPHILS ABSOLUTE: 0 K/UL (ref 0–0.2)
BASOPHILS RELATIVE PERCENT: 0.5 %
BILIRUB SERPL-MCNC: 0.3 MG/DL (ref 0–1)
BUN BLDV-MCNC: 25 MG/DL (ref 7–20)
CALCIUM SERPL-MCNC: 9.3 MG/DL (ref 8.3–10.6)
CHLORIDE BLD-SCNC: 103 MMOL/L (ref 99–110)
CHOLESTEROL, TOTAL: 122 MG/DL (ref 0–199)
CO2: 29 MMOL/L (ref 21–32)
CREAT SERPL-MCNC: 0.8 MG/DL (ref 0.8–1.3)
EOSINOPHILS ABSOLUTE: 0.2 K/UL (ref 0–0.6)
EOSINOPHILS RELATIVE PERCENT: 2.6 %
GFR AFRICAN AMERICAN: >60
GFR NON-AFRICAN AMERICAN: >60
GLUCOSE BLD-MCNC: 132 MG/DL (ref 70–99)
HCT VFR BLD CALC: 45.6 % (ref 40.5–52.5)
HDLC SERPL-MCNC: 33 MG/DL (ref 40–60)
HEMOGLOBIN: 15 G/DL (ref 13.5–17.5)
LDL CHOLESTEROL CALCULATED: 75 MG/DL
LYMPHOCYTES ABSOLUTE: 1.2 K/UL (ref 1–5.1)
LYMPHOCYTES RELATIVE PERCENT: 19.2 %
MCH RBC QN AUTO: 29 PG (ref 26–34)
MCHC RBC AUTO-ENTMCNC: 32.9 G/DL (ref 31–36)
MCV RBC AUTO: 88.2 FL (ref 80–100)
MONOCYTES ABSOLUTE: 0.4 K/UL (ref 0–1.3)
MONOCYTES RELATIVE PERCENT: 6.6 %
NEUTROPHILS ABSOLUTE: 4.5 K/UL (ref 1.7–7.7)
NEUTROPHILS RELATIVE PERCENT: 71.1 %
PDW BLD-RTO: 15 % (ref 12.4–15.4)
PLATELET # BLD: 199 K/UL (ref 135–450)
PMV BLD AUTO: 7.8 FL (ref 5–10.5)
POTASSIUM SERPL-SCNC: 4.4 MMOL/L (ref 3.5–5.1)
PROSTATE SPECIFIC ANTIGEN: 0.9 NG/ML (ref 0–4)
RBC # BLD: 5.17 M/UL (ref 4.2–5.9)
SODIUM BLD-SCNC: 143 MMOL/L (ref 136–145)
TOTAL PROTEIN: 7 G/DL (ref 6.4–8.2)
TRIGL SERPL-MCNC: 69 MG/DL (ref 0–150)
VITAMIN D 25-HYDROXY: 37.2 NG/ML
VLDLC SERPL CALC-MCNC: 14 MG/DL
WBC # BLD: 6.3 K/UL (ref 4–11)

## 2022-05-20 LAB
ESTIMATED AVERAGE GLUCOSE: 134.1 MG/DL
HBA1C MFR BLD: 6.3 %

## 2022-05-24 RX ORDER — ROSUVASTATIN CALCIUM 20 MG/1
TABLET, COATED ORAL
Qty: 90 TABLET | Refills: 2 | Status: SHIPPED | OUTPATIENT
Start: 2022-05-24 | End: 2022-06-06

## 2022-06-06 ENCOUNTER — OFFICE VISIT (OUTPATIENT)
Dept: FAMILY MEDICINE CLINIC | Age: 72
End: 2022-06-06
Payer: MEDICARE

## 2022-06-06 VITALS
BODY MASS INDEX: 36.09 KG/M2 | OXYGEN SATURATION: 95 % | WEIGHT: 281.2 LBS | SYSTOLIC BLOOD PRESSURE: 124 MMHG | DIASTOLIC BLOOD PRESSURE: 82 MMHG | HEART RATE: 87 BPM | HEIGHT: 74 IN

## 2022-06-06 DIAGNOSIS — M79.641 PAIN IN BOTH HANDS: ICD-10-CM

## 2022-06-06 DIAGNOSIS — E66.01 SEVERE OBESITY (BMI 35.0-39.9) WITH COMORBIDITY (HCC): ICD-10-CM

## 2022-06-06 DIAGNOSIS — I25.10 CORONARY ARTERY DISEASE INVOLVING NATIVE CORONARY ARTERY OF NATIVE HEART WITHOUT ANGINA PECTORIS: ICD-10-CM

## 2022-06-06 DIAGNOSIS — I10 ESSENTIAL HYPERTENSION: Primary | ICD-10-CM

## 2022-06-06 DIAGNOSIS — M77.8 TENDINITIS OF BOTH SHOULDERS: ICD-10-CM

## 2022-06-06 DIAGNOSIS — M79.642 PAIN IN BOTH HANDS: ICD-10-CM

## 2022-06-06 DIAGNOSIS — E78.2 MIXED HYPERLIPIDEMIA: ICD-10-CM

## 2022-06-06 PROBLEM — I47.1 PAROXYSMAL ATRIAL TACHYCARDIA (HCC): Status: RESOLVED | Noted: 2018-06-26 | Resolved: 2022-06-06

## 2022-06-06 PROBLEM — I47.19 PAROXYSMAL ATRIAL TACHYCARDIA: Status: RESOLVED | Noted: 2018-06-26 | Resolved: 2022-06-06

## 2022-06-06 PROBLEM — I48.3 TYPICAL ATRIAL FLUTTER (HCC): Status: RESOLVED | Noted: 2018-06-26 | Resolved: 2022-06-06

## 2022-06-06 PROCEDURE — G8427 DOCREV CUR MEDS BY ELIG CLIN: HCPCS | Performed by: FAMILY MEDICINE

## 2022-06-06 PROCEDURE — 1036F TOBACCO NON-USER: CPT | Performed by: FAMILY MEDICINE

## 2022-06-06 PROCEDURE — G8417 CALC BMI ABV UP PARAM F/U: HCPCS | Performed by: FAMILY MEDICINE

## 2022-06-06 PROCEDURE — 3017F COLORECTAL CA SCREEN DOC REV: CPT | Performed by: FAMILY MEDICINE

## 2022-06-06 PROCEDURE — 1123F ACP DISCUSS/DSCN MKR DOCD: CPT | Performed by: FAMILY MEDICINE

## 2022-06-06 PROCEDURE — 99213 OFFICE O/P EST LOW 20 MIN: CPT | Performed by: FAMILY MEDICINE

## 2022-06-06 ASSESSMENT — ENCOUNTER SYMPTOMS
NAUSEA: 0
CONSTIPATION: 0
CONSTIPATION: 0
SORE THROAT: 0
SHORTNESS OF BREATH: 0
BLOOD IN STOOL: 0
SORE THROAT: 0
ABDOMINAL PAIN: 0
RHINORRHEA: 0
DIARRHEA: 0
ABDOMINAL PAIN: 0
EYE PAIN: 0
CHEST TIGHTNESS: 0
RHINORRHEA: 0
COUGH: 0
BLOOD IN STOOL: 0
PHOTOPHOBIA: 0
COUGH: 0
VOMITING: 0

## 2022-06-06 NOTE — PROGRESS NOTES
Subjective:      Patient ID: Darene Landau is a 81 y.o. male. HPI  Patient in for checkup for several medical issues. Hypertension-on medication and blood pressure 140/80 or below when he checks it at home or elsewhere. Statin medication- was put on this medication at the time of his myocardial infarction-he had either 1 or 2 stents. Obesity- he knows he has to lose some pounds as he has put someone. He is complaining of pain in both hands usually in the morning and he takes some Tylenol which does not do a whole lot of good at about 10 or 11:00 in the morning and by early afternoon he feels better. He sees his cardiologist in 2 days and is going to check with him concerning anti-inflammatory medications and heart disease. He also has pain in both shoulders which is worse at night. Review of Systems    Review of Systems   Constitutional: Negative for unexpected weight change. HENT: Negative for congestion, postnasal drip, rhinorrhea and sore throat. Eyes: Negative for visual disturbance. Respiratory: Negative for cough, chest tightness and shortness of breath. Cardiovascular: Negative for chest pain, palpitations and leg swelling. Gastrointestinal: Negative for abdominal pain, blood in stool and constipation. No gerd   Genitourinary: Positive for frequency. Negative for dysuria and hematuria. No nocturia   Musculoskeletal: Negative for arthralgias and myalgias. See HPI   Skin: Negative for pallor and rash. Neurological: Negative for tremors, syncope and headaches. Psychiatric/Behavioral: Positive for sleep disturbance. The patient is not nervous/anxious. Objective:   Physical Exam      Physical Exam  Constitutional:       Appearance: Normal appearance. He is well-developed. He is obese. HENT:      Head: Normocephalic. Mouth/Throat:      Mouth: Mucous membranes are moist.      Pharynx: Oropharynx is clear.    Eyes:      Conjunctiva/sclera: Conjunctivae normal.   Neck:      Thyroid: No thyromegaly. Vascular: No carotid bruit. Cardiovascular:      Rate and Rhythm: Normal rate and regular rhythm. Heart sounds: Normal heart sounds. Comments: Lana Dollar test on both hands was borderline positive. Pulmonary:      Effort: Pulmonary effort is normal.      Breath sounds: Normal breath sounds. Abdominal:      General: There is no distension. Palpations: Abdomen is soft. There is no mass. Tenderness: There is no abdominal tenderness. Musculoskeletal:      Cervical back: Neck supple. Comments: Resisted abduction at 90 degrees on both shoulders produces moderate to severe pain in the shoulder area. Lymphadenopathy:      Cervical: No cervical adenopathy. Skin:     General: Skin is warm and dry. Neurological:      Mental Status: He is alert and oriented to person, place, and time. Gait: Gait normal.   Psychiatric:         Mood and Affect: Mood normal.         Behavior: Behavior normal.         Thought Content: Thought content normal.         Judgment: Judgment normal.         Assessment:       Diagnosis Orders   1. Essential hypertension     2. Mixed hyperlipidemia     3. Coronary artery disease involving native coronary artery of native heart without angina pectoris     4. Severe obesity (BMI 35.0-39. 9) with comorbidity (HCC)     5. Pain in both hands     6. Tendinitis of both shoulders           Plan:      Ewa Meier was seen today for annual exam.    Diagnoses and all orders for this visit:    Essential hypertension  Continue medications and no added salt diet-limit caffeine and preferably no alcohol-work on slow weight loss by reducing carbs and increasing activity as tolerated-notify me of any persistent elevation of blood pressure.   Mixed hyperlipidemia  Continue medications  Coronary artery disease involving native coronary artery of native heart without angina pectoris  Continue medications and visit with the

## 2022-06-06 NOTE — PROGRESS NOTES
[address] [facility]   Cardiovascular Evaluation    PATIENT: Chioma Longoria  DATE: 2022  MRN: [id number]  CSN: [id number]  : 1950      Primary Care Doctor: Harris Mendoza DO  Reason for evaluation:   Follow-up for CAD    Subjective:     Jak Chaparro. Yoseph Parham is a 82-year-old male with a history of CAD s/p inferior STEMI and PCI to mid-distal RCA with overlapping Xience Zoie 4.0 x 38 mm and 4.0 x 33 mm FADIA (tapered to final diameter of 4.8 mm) on 21, atrial flutter s/p RFCA on 18 (no longer on anticoagulation), paroxysmal atrial tachycardia, PVCs, essential hypertension, hyperlipidemia, and obesity who presents for follow-up. The patient was admitted to [facility] from -21 with an acute anterior STEMI for which he underwent PCI to the mid-distal RCA with overlapping Xience Zoie 4.0 x 38 mm and 4.0 x 33 mm FADIA, tapered to final diameter of 4.8 mm. Angiography at that time was also notable for a 40% eccentric proximal LAD stenosis and 30% proximal LCx stenosis. TTE obtained during his admission showed an LVEF of 50% with no wall motion abnormalities, grade 1 diastolic dysfunction, normal RV size and systolic function, and mild mitral and tricuspid regurgitation. Today, the patient reports that he has generally been feeling well from a cardiac standpoint. His main complaint is that for the last 2 months, he has been waking up with pain in his shoulders and hands. He says the joints in his hands have been swollen as well. As the day progresses, his joints loosen up and his pain improves. Tylenol is no longer effective at relieving his pain. He denies any chest pain, shortness of breath, palpitations, lightheadedness, dizziness, lower extremity edema, or orthopnea.       Patient Active Problem List   Diagnosis    Essential hypertension    Carotid stenosis, non-symptomatic    Primary osteoarthritis involving multiple joints    Family history of abdominal aortic aneurysm    Bilateral leg pain    Morbidly obese (HCC)    Leg swelling    Hyperlipidemia    Premature ventricular contraction    STEMI involving right coronary artery (HCC)    STEMI (ST elevation myocardial infarction) (HCC)    Hypokalemia    Fatigue    Shortness of breath    Rectal bleeding       Past Medical History:  CAD, atrial flutter, paroxysmal atrial tachycardia, PVCs, hyperlipidemia, hypertension, obesity. Surgical History:   has a past surgical history that includes Appendectomy; Dental surgery; and Cardiac surgery (05/2018). Social History:   reports that he has never smoked. He has never used smokeless tobacco. He reports current alcohol use. He reports that he does not use drugs. Family History:  Family history is notable for AAA in brother. Home Medications:  Reviewed and are listed in nursing record. and/or listed below  Current Outpatient Medications   Medication Sig Dispense Refill    rosuvastatin (CRESTOR) 20 MG tablet Take 20 mg by mouth daily      lisinopril-hydroCHLOROthiazide (PRINZIDE;ZESTORETIC) 20-25 MG per tablet TAKE ONE TABLET BY MOUTH DAILY 90 tablet 3    carvedilol (COREG) 3.125 MG tablet Take 1 tablet by mouth 2 times daily (with meals) 180 tablet 3    aspirin 81 MG chewable tablet Take 1 tablet by mouth daily 30 tablet 3     No current facility-administered medications for this visit. Allergies:  Patient has no known allergies. Review of Systems:   A 14 point review of symptoms completed. Pertinent positives identified in the HPI, all other review of symptoms negative as below. Review of Systems   Constitutional: Negative for chills and fever. HENT: Negative for congestion, rhinorrhea and sore throat. Eyes: Negative for photophobia, pain and visual disturbance. Respiratory: Negative for cough and shortness of breath. Cardiovascular: Negative for chest pain, palpitations and leg swelling.    Gastrointestinal: Negative for abdominal pain, blood in stool, constipation, diarrhea, nausea and vomiting. Endocrine: Negative for cold intolerance and heat intolerance. Genitourinary: Negative for difficulty urinating, dysuria and hematuria. Musculoskeletal: Positive for arthralgias, joint swelling and myalgias. Skin: Negative for rash and wound. Allergic/Immunologic: Negative for environmental allergies and food allergies. Neurological: Negative for dizziness, syncope and light-headedness. Hematological: Negative for adenopathy. Does not bruise/bleed easily. Psychiatric/Behavioral: Negative for dysphoric mood. The patient is not nervous/anxious. Objective:   PHYSICAL EXAM:    Vitals:    06/08/22 1054   BP: 124/76   Pulse: 80   SpO2: 97%    Weight: 282 lb (127.9 kg)     Wt Readings from Last 3 Encounters:   06/08/22 282 lb (127.9 kg)   06/06/22 281 lb 3.2 oz (127.6 kg)   12/15/21 277 lb (125.6 kg)     General: Adult male in no acute distress. Pleasant and interactive on exam.  HEENT: Normocephalic, atraumatic, non-icteric, hearing intact, nares normal, mucous membranes moist.  Neck: No JVD. Heart: Regular rate and rhythm. Normal S1 and S2. No murmurs, gallops or rubs. Lungs: Normal respiratory effort. Clear to auscultation bilaterally. No wheezes, rales, or rhonchi. Abdomen: Soft, non-tender. Normoactive bowel sounds. No masses or organomegaly. Skin: No rashes, wounds, or lesions. Pulses: 2+ and symmetric. Extremities: No clubbing, cyanosis, or edema. Psych: Normal mood and affect. Neuro: Alert and oriented to person, place, and time. No focal deficits noted.       LABS   CBC:      Lab Results   Component Value Date    WBC 6.3 05/19/2022    RBC 5.17 05/19/2022    HGB 15.0 05/19/2022    HCT 45.6 05/19/2022    MCV 88.2 05/19/2022    RDW 15.0 05/19/2022     05/19/2022     CMP:  Lab Results   Component Value Date     05/19/2022    K 4.4 05/19/2022    K 3.7 01/22/2021     05/19/2022    CO2 29 05/19/2022    BUN 25 05/19/2022    CREATININE 0.8 05/19/2022    GFRAA >60 05/19/2022    GFRAA >60 09/18/2012    AGRATIO 1.8 05/19/2022    LABGLOM >60 05/19/2022    GLUCOSE 132 05/19/2022    PROT 7.0 05/19/2022    PROT 6.7 09/18/2012    CALCIUM 9.3 05/19/2022    BILITOT 0.3 05/19/2022    ALKPHOS 55 05/19/2022    AST 14 05/19/2022    ALT 14 05/19/2022     PT/INR:   No results found for: PTINR  Liver:  No components found for: CHLPL  Lab Results   Component Value Date    ALT 14 05/19/2022    AST 14 (L) 05/19/2022    ALKPHOS 55 05/19/2022    BILITOT 0.3 05/19/2022     Lab Results   Component Value Date    LABA1C 6.3 05/19/2022     Lipids:         Lab Results   Component Value Date    TRIG 69 05/19/2022    TRIG 143 01/22/2021    TRIG 101 11/23/2020            Lab Results   Component Value Date    HDL 33 (L) 05/19/2022    HDL 31 (L) 01/22/2021    HDL 38 (L) 11/23/2020            Lab Results   Component Value Date    LDLCALC 75 05/19/2022    LDLCALC 70 01/22/2021    LDLCALC 101 (H) 11/23/2020            Lab Results   Component Value Date    LABVLDL 14 05/19/2022    LABVLDL 29 01/22/2021    LABVLDL 20 11/23/2020         CARDIAC DATA     ECHO:   TTE 1/22/2021:   Summary   Technically difficult study due to poor acoustic windows, definity is used   for myocardial border enhancement. Left ventricular systolic function is low normal with a visually estimated   ejection fraction of 50%. Grade I diastolic dysfunction with normal filling pressure. Mild mitral and tricuspid regurgitation. Systolic pulmonary artery pressure (SPAP) is normal and estimated at 24 mmHg   (right atrial pressure 8 mmHg).     STRESS TEST: N/A    CARDIAC CATH:   [facility] 1/21/2021:  Dominance: Right      LM: normal   LAD: 40% eccentric proximal disease  LCx: 30% proximal stenosis   RCA: 100% mid occlusion     LVEDP: 28 mmHg  LVEF: 50-55%     6 Fr JR 4 Guide with BMW wire and guideliner extension      3.5 x 12 mm predil (see log)     4.0 x 38 mm Xience FADIA extending to distal right   4.0 x 33 mm Xience FADIA overlapping previous stent proximally to mid vessel     5.0 x 20 mm NC Trek postdil (from 4.6 mm distally within stented segment to 4.8 proximally)     0% residual   Large RPDA and RPLB with ARLYN III Flow      EP study and aflutter ablation 5/28/2018 (Dr. Cristin Obando):  CONCLUSION:  1. Complex electrophysiology study with normal baseline conduction  parameters. 2.  Status post successful cavotricuspid isthmus ablation for  counterclockwise flutter. VASCULAR/OTHER IMAGING:  Non-contrast chest CT 1/23/2021:  FINDINGS:   Mediastinum: The heart and great vessels are normal in size.  Calcified   atheromatous plaque and coronary calcifications are noted.  No pericardial   effusion.  No enlarged or suspicious-appearing lymph nodes are identified.       Lungs/pleura: Linear opacities in the medial lingula, medial right middle   lobe and lung bases are noted consistent with subsegmental atelectasis or   scarring.  Sequela of old granulomatous disease.  No consolidation.  No   evidence for edema.  No effusion.  The central airway is patent.       Upper Abdomen: Tiny hypoattenuating liver lesions are noted, which may   represent cysts.  No acute findings.       Soft Tissues/Bones: No acute osseous abnormality identified. Multilevel   degenerative change in the lower cervical and lower thoracic spine. Assessment:     1. CAD - S/p acute inferior STEMI and PCI to mid-distal RCA with overlapping Xience Zoie 4.0 x 38 mm and 4.0 x 33 mm FADIA, tapered to final diameter of 4.8 mm on 1/21/21. TTE from that time showed preserved biventricular systolic function. Overall, patient remains stable from a cardiovascular standpoint, continues to deny angina, and reports good exercise tolerance. 2. Arthralgias/joint swelling - Concern for possible underlying inflammatory arthritis. He is going to follow-up with his PCP for further evaluation.   If ultimately not found to have any significant arthritic disease, can consider discontinuing statin and see if symptoms improve. If statin is ultimately discontinued would consider starting PCSK9 inhibitor. 3. Atrial flutter - S/p RFCA in 2018. No longer on anticoagulation. Follows with EP. 4. Paroxysmal atrial tachycardia  5. PVCs  6. Mild mitral regurgitation  7. Mild tricuspid regurgitation  8. Essential hypertension  9. Hyperlipidemia  10. Obesity    Plan:     1. Patient will follow-up with his PCP for further evaluation off possible underlying inflammatory arthritis. If ultimately not found to have any significant arthritic disease, can consider discontinuing statin and see if symptoms improve. If statin is ultimately discontinued would consider starting PCSK9 inhibitor. 2. OK to take short course of NSAIDs as needed for pain, but would exercise caution with longer term use. 3. Continue aspirin 81 mg daily, rosuvastatin 20 mg daily, coreg 3.125 mg BID, and lisinopril-HCTZ 20-25 mg daily for now. 4. Continue to encourage heart healthy, low sodium diet and regular physical exercise for at least 30 minutes a minimum of 3-5 times per week. 5. Follow-up with me in 6 months. Scribe's attestation: This note was scribed in the presence of Noel Garza DO by Wilfrid Amado RN      It is a pleasure to assist in the care of Mj SCHUSTERLuly Delgadillo. Please call with any questions. The michael documentation has been prepared under my direction and personally reviewed by me in its entirety. I confirm that the note above accurately reflects all work, treatment, procedures, and medical decision making performed by me. I, Dr. Lenka Perez, personally performed the services described in this documentation as scribed by Wilfrid Amado RN in my presence, and it is both accurate and complete to the best of our ability.        Lenka Perez, [address] [facility]  [phone] [facility]  [phone] [address] [place] [address]

## 2022-06-08 ENCOUNTER — OFFICE VISIT (OUTPATIENT)
Dept: CARDIOLOGY CLINIC | Age: 72
End: 2022-06-08
Payer: MEDICARE

## 2022-06-08 VITALS
HEIGHT: 74 IN | DIASTOLIC BLOOD PRESSURE: 76 MMHG | OXYGEN SATURATION: 97 % | BODY MASS INDEX: 36.19 KG/M2 | WEIGHT: 282 LBS | HEART RATE: 80 BPM | SYSTOLIC BLOOD PRESSURE: 124 MMHG

## 2022-06-08 DIAGNOSIS — I47.1 PAROXYSMAL ATRIAL TACHYCARDIA (HCC): ICD-10-CM

## 2022-06-08 DIAGNOSIS — I25.10 CORONARY ARTERY DISEASE INVOLVING NATIVE CORONARY ARTERY OF NATIVE HEART WITHOUT ANGINA PECTORIS: ICD-10-CM

## 2022-06-08 DIAGNOSIS — E78.5 HYPERLIPIDEMIA, UNSPECIFIED HYPERLIPIDEMIA TYPE: ICD-10-CM

## 2022-06-08 DIAGNOSIS — E66.9 OBESITY, UNSPECIFIED CLASSIFICATION, UNSPECIFIED OBESITY TYPE, UNSPECIFIED WHETHER SERIOUS COMORBIDITY PRESENT: ICD-10-CM

## 2022-06-08 DIAGNOSIS — M25.40 JOINT SWELLING: ICD-10-CM

## 2022-06-08 DIAGNOSIS — I49.3 PVC (PREMATURE VENTRICULAR CONTRACTION): ICD-10-CM

## 2022-06-08 DIAGNOSIS — I48.92 ATRIAL FLUTTER, UNSPECIFIED TYPE (HCC): ICD-10-CM

## 2022-06-08 DIAGNOSIS — I10 ESSENTIAL HYPERTENSION: Primary | ICD-10-CM

## 2022-06-08 DIAGNOSIS — M25.50 ARTHRALGIA, UNSPECIFIED JOINT: ICD-10-CM

## 2022-06-08 PROCEDURE — 3017F COLORECTAL CA SCREEN DOC REV: CPT | Performed by: INTERNAL MEDICINE

## 2022-06-08 PROCEDURE — G8417 CALC BMI ABV UP PARAM F/U: HCPCS | Performed by: INTERNAL MEDICINE

## 2022-06-08 PROCEDURE — 1123F ACP DISCUSS/DSCN MKR DOCD: CPT | Performed by: INTERNAL MEDICINE

## 2022-06-08 PROCEDURE — G8427 DOCREV CUR MEDS BY ELIG CLIN: HCPCS | Performed by: INTERNAL MEDICINE

## 2022-06-08 PROCEDURE — 1036F TOBACCO NON-USER: CPT | Performed by: INTERNAL MEDICINE

## 2022-06-08 PROCEDURE — 99214 OFFICE O/P EST MOD 30 MIN: CPT | Performed by: INTERNAL MEDICINE

## 2022-06-08 RX ORDER — ROSUVASTATIN CALCIUM 20 MG/1
20 TABLET, COATED ORAL DAILY
COMMUNITY

## 2022-06-08 NOTE — PATIENT INSTRUCTIONS
Plan: 1. OK to use Ibuprofen on a short term basis as needed for pain relief   -Follow up with your family doctor regarding arthritic type pain  2. Continue taking all cardiac medications as prescribed  3.  Follow-up with me in 6 months

## 2022-06-09 DIAGNOSIS — M79.642 PAIN IN BOTH HANDS: Primary | ICD-10-CM

## 2022-06-09 DIAGNOSIS — M79.641 PAIN IN BOTH HANDS: Primary | ICD-10-CM

## 2022-06-09 RX ORDER — NAPROXEN 500 MG/1
500 TABLET ORAL 2 TIMES DAILY WITH MEALS
Qty: 60 TABLET | Refills: 0 | Status: SHIPPED | OUTPATIENT
Start: 2022-06-09 | End: 2022-07-07 | Stop reason: ALTCHOICE

## 2022-06-10 DIAGNOSIS — M79.641 PAIN IN BOTH HANDS: ICD-10-CM

## 2022-06-10 DIAGNOSIS — M79.642 PAIN IN BOTH HANDS: ICD-10-CM

## 2022-06-10 LAB
C-REACTIVE PROTEIN: 10.3 MG/L (ref 0–5.1)
RHEUMATOID FACTOR: <10 IU/ML
SEDIMENTATION RATE, ERYTHROCYTE: 36 MM/HR (ref 0–20)

## 2022-06-13 ASSESSMENT — ENCOUNTER SYMPTOMS: SHORTNESS OF BREATH: 0

## 2022-06-28 ENCOUNTER — OFFICE VISIT (OUTPATIENT)
Dept: FAMILY MEDICINE CLINIC | Age: 72
End: 2022-06-28
Payer: MEDICARE

## 2022-06-28 VITALS
DIASTOLIC BLOOD PRESSURE: 70 MMHG | OXYGEN SATURATION: 96 % | BODY MASS INDEX: 36.21 KG/M2 | WEIGHT: 282 LBS | HEART RATE: 72 BPM | SYSTOLIC BLOOD PRESSURE: 124 MMHG

## 2022-06-28 DIAGNOSIS — M25.532 LEFT WRIST PAIN: ICD-10-CM

## 2022-06-28 DIAGNOSIS — M65.4 DE QUERVAIN'S TENOSYNOVITIS: ICD-10-CM

## 2022-06-28 DIAGNOSIS — S46.819A STRAIN OF DELTOID MUSCLE, UNSPECIFIED LATERALITY, INITIAL ENCOUNTER: Primary | ICD-10-CM

## 2022-06-28 DIAGNOSIS — R79.82 ELEVATED C-REACTIVE PROTEIN (CRP): ICD-10-CM

## 2022-06-28 DIAGNOSIS — E78.2 MIXED HYPERLIPIDEMIA: ICD-10-CM

## 2022-06-28 PROCEDURE — 99214 OFFICE O/P EST MOD 30 MIN: CPT | Performed by: FAMILY MEDICINE

## 2022-06-28 PROCEDURE — 3017F COLORECTAL CA SCREEN DOC REV: CPT | Performed by: FAMILY MEDICINE

## 2022-06-28 PROCEDURE — 1123F ACP DISCUSS/DSCN MKR DOCD: CPT | Performed by: FAMILY MEDICINE

## 2022-06-28 PROCEDURE — G8427 DOCREV CUR MEDS BY ELIG CLIN: HCPCS | Performed by: FAMILY MEDICINE

## 2022-06-28 PROCEDURE — 1036F TOBACCO NON-USER: CPT | Performed by: FAMILY MEDICINE

## 2022-06-28 PROCEDURE — G8417 CALC BMI ABV UP PARAM F/U: HCPCS | Performed by: FAMILY MEDICINE

## 2022-06-28 RX ORDER — BACLOFEN 10 MG/1
5-10 TABLET ORAL NIGHTLY PRN
Qty: 30 TABLET | Refills: 0 | Status: SHIPPED | OUTPATIENT
Start: 2022-06-28 | End: 2022-07-07 | Stop reason: DRUGHIGH

## 2022-06-28 RX ORDER — METHYLPREDNISOLONE 4 MG/1
TABLET ORAL
Qty: 21 TABLET | Refills: 0 | Status: SHIPPED | OUTPATIENT
Start: 2022-06-28 | End: 2022-07-07 | Stop reason: ALTCHOICE

## 2022-06-28 SDOH — ECONOMIC STABILITY: TRANSPORTATION INSECURITY
IN THE PAST 12 MONTHS, HAS THE LACK OF TRANSPORTATION KEPT YOU FROM MEDICAL APPOINTMENTS OR FROM GETTING MEDICATIONS?: NO

## 2022-06-28 SDOH — ECONOMIC STABILITY: HOUSING INSECURITY: IN THE LAST 12 MONTHS, HOW MANY PLACES HAVE YOU LIVED?: 1

## 2022-06-28 SDOH — ECONOMIC STABILITY: TRANSPORTATION INSECURITY
IN THE PAST 12 MONTHS, HAS LACK OF TRANSPORTATION KEPT YOU FROM MEETINGS, WORK, OR FROM GETTING THINGS NEEDED FOR DAILY LIVING?: NO

## 2022-06-28 SDOH — ECONOMIC STABILITY: FOOD INSECURITY: WITHIN THE PAST 12 MONTHS, THE FOOD YOU BOUGHT JUST DIDN'T LAST AND YOU DIDN'T HAVE MONEY TO GET MORE.: NEVER TRUE

## 2022-06-28 SDOH — ECONOMIC STABILITY: INCOME INSECURITY: IN THE LAST 12 MONTHS, WAS THERE A TIME WHEN YOU WERE NOT ABLE TO PAY THE MORTGAGE OR RENT ON TIME?: NO

## 2022-06-28 SDOH — ECONOMIC STABILITY: HOUSING INSECURITY
IN THE LAST 12 MONTHS, WAS THERE A TIME WHEN YOU DID NOT HAVE A STEADY PLACE TO SLEEP OR SLEPT IN A SHELTER (INCLUDING NOW)?: NO

## 2022-06-28 SDOH — ECONOMIC STABILITY: FOOD INSECURITY: WITHIN THE PAST 12 MONTHS, YOU WORRIED THAT YOUR FOOD WOULD RUN OUT BEFORE YOU GOT MONEY TO BUY MORE.: NEVER TRUE

## 2022-06-28 ASSESSMENT — ANXIETY QUESTIONNAIRES
3. WORRYING TOO MUCH ABOUT DIFFERENT THINGS: 0
GAD7 TOTAL SCORE: 0
IF YOU CHECKED OFF ANY PROBLEMS ON THIS QUESTIONNAIRE, HOW DIFFICULT HAVE THESE PROBLEMS MADE IT FOR YOU TO DO YOUR WORK, TAKE CARE OF THINGS AT HOME, OR GET ALONG WITH OTHER PEOPLE: NOT DIFFICULT AT ALL
5. BEING SO RESTLESS THAT IT IS HARD TO SIT STILL: 0
1. FEELING NERVOUS, ANXIOUS, OR ON EDGE: 0
4. TROUBLE RELAXING: 0
2. NOT BEING ABLE TO STOP OR CONTROL WORRYING: 0
7. FEELING AFRAID AS IF SOMETHING AWFUL MIGHT HAPPEN: 0
6. BECOMING EASILY ANNOYED OR IRRITABLE: 0

## 2022-06-28 ASSESSMENT — PATIENT HEALTH QUESTIONNAIRE - PHQ9
SUM OF ALL RESPONSES TO PHQ QUESTIONS 1-9: 0
SUM OF ALL RESPONSES TO PHQ9 QUESTIONS 1 & 2: 0
1. LITTLE INTEREST OR PLEASURE IN DOING THINGS: 0
2. FEELING DOWN, DEPRESSED OR HOPELESS: 0

## 2022-06-28 ASSESSMENT — LIFESTYLE VARIABLES
HOW OFTEN DO YOU HAVE A DRINK CONTAINING ALCOHOL: 4 OR MORE TIMES A WEEK
HOW MANY STANDARD DRINKS CONTAINING ALCOHOL DO YOU HAVE ON A TYPICAL DAY: 3 OR 4

## 2022-06-28 ASSESSMENT — SOCIAL DETERMINANTS OF HEALTH (SDOH): HOW HARD IS IT FOR YOU TO PAY FOR THE VERY BASICS LIKE FOOD, HOUSING, MEDICAL CARE, AND HEATING?: NOT HARD AT ALL

## 2022-06-28 NOTE — PATIENT INSTRUCTIONS
Patient Education        Myxander Sham Disease: Exercises  Introduction  Here are some examples of exercises for you to try. The exercises may be suggested for a condition or for rehabilitation. Start each exercise slowly. Ease off the exercises if you start to have pain. You will be told when to start these exercises and which ones will work bestfor you. How to do the exercises  Thumb lifts    1. Place your hand on a flat surface, with your palm up. 2. Lift your thumb away from your palm to make a \"C\" shape. 3. Hold for about 6 seconds. 4. Repeat 8 to 12 times. Passive thumb MP flexion    1. Hold your hand in front of you, and turn your hand so your little finger faces down and your thumb faces up. (Your hand should be in the position used for shaking someone's hand.) You may also rest your hand on a flat surface. 2. Use the fingers on your other hand to bend your thumb down at the point where your thumb connects to your palm. 3. Hold for at least 15 to 30 seconds. 4. Repeat 2 to 4 times. Finkelstein stretch    1. Hold your arms out in front of you. (Your hand should be in the position used for shaking someone's hand.)  2. Bend your thumb toward your palm. 3. Use your other hand to gently stretch your thumb and wrist downward until you feel the stretch on the thumb side of your wrist.  4. Hold for at least 15 to 30 seconds. 5. Repeat 2 to 4 times. Resisted ulnar deviation    For this exercise, you will need elastic exercise material, such as surgicaltubing or Thera-Band. 1. Sit leaning forward with your legs slightly spread and your elbow on your thigh. 2. Grasp one end of the band with your palm down, and step on the other end with the foot opposite the hand holding the band. 3. Slowly bend your wrist sideways and away from your knee. 4. Repeat 8 to 12 times. Follow-up care is a key part of your treatment and safety.  Be sure to make and go to all appointments, and call your doctor if you are

## 2022-06-28 NOTE — PROGRESS NOTES
Anushka Fuentes is a 81 y.o. male    Chief Complaint   Patient presents with    Arm Pain     pain is severe- Pt states a 10- worst in morning than evening    Hand Pain    Hyperlipidemia       HPI:    This is a new patient to me. Arm Pain   The incident occurred more than 1 week ago. There was no injury mechanism. The pain is present in the upper left arm and upper right arm. Pertinent negatives include no numbness. The symptoms are aggravated by movement. He has tried NSAIDs for the symptoms. The treatment provided mild relief. Hand Pain   The incident occurred more than 1 week ago. There was no injury mechanism. The pain is present in the right wrist and left wrist. The quality of the pain is described as burning. The pain does not radiate. The pain is severe. The pain has been worsening since the incident. Pertinent negatives include no numbness. The symptoms are aggravated by movement. He has tried NSAIDs for the symptoms. The treatment provided mild relief. Hyperlipidemia  This is a chronic problem. The current episode started more than 1 year ago. The problem is controlled. Recent lipid tests were reviewed and are low. Exacerbating diseases include obesity. Associated symptoms include myalgias. Current antihyperlipidemic treatment includes statins. The current treatment provides significant improvement of lipids. There are no compliance problems. Risk factors for coronary artery disease include hypertension, male sex and obesity. ROS:    Review of Systems   Musculoskeletal: Positive for myalgias. Neurological: Negative for numbness. /70   Pulse 72   Wt 282 lb (127.9 kg)   SpO2 96%   BMI 36.21 kg/m²     Physical Exam:    Physical Exam  Constitutional:       General: He is not in acute distress. Appearance: Normal appearance. He is obese. He is not ill-appearing or toxic-appearing. HENT:      Head: Normocephalic. Neurological:      Mental Status: He is alert. Psychiatric:         Mood and Affect: Mood normal.         Behavior: Behavior normal.         Thought Content: Thought content normal.     Positive de Quervain's test on the right. Pain over the deltoid bilaterally. Current Outpatient Medications   Medication Sig Dispense Refill    methylPREDNISolone (MEDROL DOSEPACK) 4 MG tablet Take by mouth. Take with food. 21 tablet 0    baclofen (LIORESAL) 10 MG tablet Take 0.5-1 tablets by mouth nightly as needed (muscle spasms) 30 tablet 0    naproxen (NAPROSYN) 500 MG tablet Take 1 tablet by mouth 2 times daily (with meals) 60 tablet 0    rosuvastatin (CRESTOR) 20 MG tablet Take 20 mg by mouth daily      lisinopril-hydroCHLOROthiazide (PRINZIDE;ZESTORETIC) 20-25 MG per tablet TAKE ONE TABLET BY MOUTH DAILY 90 tablet 3    carvedilol (COREG) 3.125 MG tablet Take 1 tablet by mouth 2 times daily (with meals) 180 tablet 3    aspirin 81 MG chewable tablet Take 1 tablet by mouth daily 30 tablet 3     No current facility-administered medications for this visit. Assessment:    1. Strain of deltoid muscle, unspecified laterality, initial encounter    2. De Quervain's tenosynovitis    3. Left wrist pain    4. Mixed hyperlipidemia    5. Elevated C-reactive protein (CRP)        Plan:    1. Strain of deltoid muscle, unspecified laterality, initial encounter  Discussed to try a steroid taper with food. Avoid NSAIDs while on the steroid taper. Look up exercises online please.  - methylPREDNISolone (MEDROL DOSEPACK) 4 MG tablet; Take by mouth. Take with food. Dispense: 21 tablet; Refill: 0  - baclofen (LIORESAL) 10 MG tablet; Take 0.5-1 tablets by mouth nightly as needed (muscle spasms)  Dispense: 30 tablet; Refill: 0    2. De Quervain's tenosynovitis  Try the exercises and wear a brace and apply ice.  - methylPREDNISolone (MEDROL DOSEPACK) 4 MG tablet; Take by mouth. Take with food. Dispense: 21 tablet; Refill: 0    3.  Left wrist pain  Unclear etiology but possibly carpal tunnel related. Encouraged to wear brace. Hopefully the steroid taper can help symptoms. - methylPREDNISolone (MEDROL DOSEPACK) 4 MG tablet; Take by mouth. Take with food. Dispense: 21 tablet; Refill: 0    4. Mixed hyperlipidemia  The Crestor might be a culprit. However he has been on it for over a year. His recent vitamin D levels were low normal.  Discussed taking the Crestor every other day to see if symptoms improve as his recent lipids were stable. 5. Elevated C-reactive protein (CRP)  If still no improvement and if it progresses to become a chronic issue, I would recommend seeing rheumatology given the elevated CRP and ESR.  - Tammy Maza MD, Rheumatology, [facility]      Patient to return to clinic if symptoms worsen or fail to improve.

## 2022-06-30 ENCOUNTER — TELEMEDICINE (OUTPATIENT)
Dept: FAMILY MEDICINE CLINIC | Age: 72
End: 2022-06-30
Payer: MEDICARE

## 2022-06-30 DIAGNOSIS — Z00.00 INITIAL MEDICARE ANNUAL WELLNESS VISIT: Primary | ICD-10-CM

## 2022-06-30 PROCEDURE — 1123F ACP DISCUSS/DSCN MKR DOCD: CPT | Performed by: FAMILY MEDICINE

## 2022-06-30 PROCEDURE — 3017F COLORECTAL CA SCREEN DOC REV: CPT | Performed by: FAMILY MEDICINE

## 2022-06-30 PROCEDURE — G0438 PPPS, INITIAL VISIT: HCPCS | Performed by: FAMILY MEDICINE

## 2022-06-30 ASSESSMENT — PATIENT HEALTH QUESTIONNAIRE - PHQ9
1. LITTLE INTEREST OR PLEASURE IN DOING THINGS: 0
SUM OF ALL RESPONSES TO PHQ QUESTIONS 1-9: 0
SUM OF ALL RESPONSES TO PHQ QUESTIONS 1-9: 0
2. FEELING DOWN, DEPRESSED OR HOPELESS: 0
SUM OF ALL RESPONSES TO PHQ QUESTIONS 1-9: 0
SUM OF ALL RESPONSES TO PHQ9 QUESTIONS 1 & 2: 0
SUM OF ALL RESPONSES TO PHQ QUESTIONS 1-9: 0

## 2022-06-30 ASSESSMENT — LIFESTYLE VARIABLES
HOW MANY STANDARD DRINKS CONTAINING ALCOHOL DO YOU HAVE ON A TYPICAL DAY: 1 OR 2
HOW OFTEN DO YOU HAVE A DRINK CONTAINING ALCOHOL: 2-4 TIMES A MONTH

## 2022-06-30 NOTE — PATIENT INSTRUCTIONS
Personalized Preventive Plan for [facility] 6/30/2022  Medicare offers a range of preventive health benefits. Some of the tests and screenings are paid in full while other may be subject to a deductible, co-insurance, and/or copay. Some of these benefits include a comprehensive review of your medical history including lifestyle, illnesses that may run in your family, and various assessments and screenings as appropriate. After reviewing your medical record and screening and assessments performed today your provider may have ordered immunizations, labs, imaging, and/or referrals for you. A list of these orders (if applicable) as well as your Preventive Care list are included within your After Visit Summary for your review. Other Preventive Recommendations:    · A preventive eye exam performed by an eye specialist is recommended every 1-2 years to screen for glaucoma; cataracts, macular degeneration, and other eye disorders. · A preventive dental visit is recommended every 6 months. · Try to get at least 150 minutes of exercise per week or 10,000 steps per day on a pedometer . · Order or download the FREE \"Exercise & Physical Activity: Your Everyday Guide\" from [employer]. Call 7-862.377.3649 or search [employer] online. · You need 6645-4542 mg of calcium and 8086-6514 IU of vitamin D per day. It is possible to meet your calcium requirement with diet alone, but a vitamin D supplement is usually necessary to meet this goal.  · When exposed to the sun, use a sunscreen that protects against both UVA and UVB radiation with an SPF of 30 or greater. Reapply every 2 to 3 hours or after sweating, drying off with a towel, or swimming. · Always wear a seat belt when traveling in a car. Always wear a helmet when riding a bicycle or motorcycle. Personalized Preventive Plan for [facility] 6/30/2022  Medicare offers a range of preventive health benefits. Some of the tests and screenings are paid in full while other may be subject to a deductible, co-insurance, and/or copay. Some of these benefits include a comprehensive review of your medical history including lifestyle, illnesses that may run in your family, and various assessments and screenings as appropriate. After reviewing your medical record and screening and assessments performed today your provider may have ordered immunizations, labs, imaging, and/or referrals for you. A list of these orders (if applicable) as well as your Preventive Care list are included within your After Visit Summary for your review. Other Preventive Recommendations:    A preventive eye exam performed by an eye specialist is recommended every 1-2 years to screen for glaucoma; cataracts, macular degeneration, and other eye disorders. A preventive dental visit is recommended every 6 months. Try to get at least 150 minutes of exercise per week or 10,000 steps per day on a pedometer . Order or download the FREE \"Exercise & Physical Activity: Your Everyday Guide\" from [employer]. Call 0-641.190.9065 or search [employer] online. You need 2486-6905 mg of calcium and 3436-8249 IU of vitamin D per day. It is possible to meet your calcium requirement with diet alone, but a vitamin D supplement is usually necessary to meet this goal.  When exposed to the sun, use a sunscreen that protects against both UVA and UVB radiation with an SPF of 30 or greater. Reapply every 2 to 3 hours or after sweating, drying off with a towel, or swimming. Always wear a seat belt when traveling in a car. Always wear a helmet when riding a bicycle or motorcycle.

## 2022-06-30 NOTE — PROGRESS NOTES
Medicare Annual Wellness Visit    Kevin Lucio is here for Medicare AWV    Assessment & Plan   Initial Medicare annual wellness visit      Recommendations for Preventive Services Due: see orders and patient instructions/AVS.  Recommended screening schedule for the next 5-10 years is provided to the patient in written form: see Patient Instructions/AVS.     Return for Medicare Annual Wellness Visit in 1 year. Subjective       Patient's complete Health Risk Assessment and screening values have been reviewed and are found in Flowsheets. The following problems were reviewed today and where indicated follow up appointments were made and/or referrals ordered.     Positive Risk Factor Screenings with Interventions:         Substance Use - Alcohol Interventions:  Reviewed recommended alcohol consumption guidelines with the patient          General Health and ACP:  General  In general, how would you say your health is?: Good  In the past 7 days, have you experienced any of the following: New or Increased Pain, New or Increased Fatigue, Loneliness, Social Isolation, Stress or Anger?: (!) Yes  Select all that apply: (!) New or Increased Pain  Do you get the social and emotional support that you need?: Yes  Do you have a Living Will?: (!) No    Advance Directives     Power of  Living Will ACP-Advance Directive ACP-Power of     Not on File Not on File Not on File Not on File      General Health Risk Interventions:  · No Living Will: Patient declines ACP discussion/assistance    Health Habits/Nutrition:     Physical Activity: Sufficiently Active    Days of Exercise per Week: 7 days    Minutes of Exercise per Session: 150+ min     Have you lost any weight without trying in the past 3 months?: No     Have you seen the dentist within the past year?: Yes    Health Habits/Nutrition Interventions:  · Nutritional issues:  educational materials for healthy, well-balanced diet provided    Hearing/Vision:  Do you or your family notice any trouble with your hearing that hasn't been managed with hearing aids?: No  Do you have difficulty driving, watching TV, or doing any of your daily activities because of your eyesight?: No  Have you had an eye exam within the past year?: (!) No  No exam data present    Hearing/Vision Interventions:  · Vision concerns:  patient encouraged to make appointment with his/her eye specialist            Objective      Patient-Reported Vitals  Patient-Reported Weight: 282lb  Patient-Reported Height: 6' 2\"       Patient did not have recent blood pressure reading from home machine. No Known Allergies  Prior to Visit Medications    Medication Sig Taking? Authorizing Provider   methylPREDNISolone (MEDROL DOSEPACK) 4 MG tablet Take by mouth. Take with food. Socarmelita Singh, DO   baclofen (LIORESAL) 10 MG tablet Take 0.5-1 tablets by mouth nightly as needed (muscle spasms)  Socarmelita Singh, DO   naproxen (NAPROSYN) 500 MG tablet Take 1 tablet by mouth 2 times daily (with meals)  Patient not taking: Reported on 6/30/2022  Cahu Prieto DO   rosuvastatin (CRESTOR) 20 MG tablet Take 20 mg by mouth daily  Historical Provider, MD   lisinopril-hydroCHLOROthiazide (PRINZIDE;ZESTORETIC) 20-25 MG per tablet TAKE ONE TABLET BY MOUTH DAILY  Noel Garza DO   carvedilol (COREG) 3.125 MG tablet Take 1 tablet by mouth 2 times daily (with meals)  Noel Garza DO   aspirin 81 MG chewable tablet Take 1 tablet by mouth daily  Hector Cano MD       [facility] (Including outside providers/suppliers regularly involved in providing care):   Patient Care Team:  Saba Gonzales DO as PCP - General (Family Medicine)  Saba Gonzales DO as PCP - [facility] Empaneled Provider     Reviewed and updated this visit:  Tobacco  Allergies  Meds  Med Hx  Surg Hx  Soc Hx  Fam Hx           Mj Delgadillo, was evaluated through a synchronous (real-time) audio-video encounter.  The patient (or guardian if applicable) is aware that this is a billable service, which includes applicable co-pays. This Virtual Visit was conducted with patient's (and/or legal guardian's) consent. The visit was conducted pursuant to the emergency declaration under the [facility], [address] [place] [address] authority and the [employer] and [employer] General Act. Patient identification was verified, and a caregiver was present when appropriate. The patient was located at Home: [address] [place] [address]. Provider was located at [facility] (Appt Dept): [address] [place] [address]  [address] [facility] [address] [place],  [address] [facility] [address]. This encounter was performed under myCarolyn DOs, direct supervision, 6/30/2022.

## 2022-07-07 ENCOUNTER — OFFICE VISIT (OUTPATIENT)
Dept: FAMILY MEDICINE CLINIC | Age: 72
End: 2022-07-07
Payer: MEDICARE

## 2022-07-07 VITALS
WEIGHT: 282 LBS | OXYGEN SATURATION: 98 % | DIASTOLIC BLOOD PRESSURE: 74 MMHG | SYSTOLIC BLOOD PRESSURE: 138 MMHG | HEART RATE: 75 BPM | BODY MASS INDEX: 36.21 KG/M2

## 2022-07-07 DIAGNOSIS — M25.532 LEFT WRIST PAIN: ICD-10-CM

## 2022-07-07 DIAGNOSIS — E78.2 MIXED HYPERLIPIDEMIA: ICD-10-CM

## 2022-07-07 DIAGNOSIS — M65.4 DE QUERVAIN'S TENOSYNOVITIS: ICD-10-CM

## 2022-07-07 DIAGNOSIS — S46.819A STRAIN OF DELTOID MUSCLE, UNSPECIFIED LATERALITY, INITIAL ENCOUNTER: Primary | ICD-10-CM

## 2022-07-07 DIAGNOSIS — R79.82 ELEVATED C-REACTIVE PROTEIN (CRP): ICD-10-CM

## 2022-07-07 PROCEDURE — 99214 OFFICE O/P EST MOD 30 MIN: CPT | Performed by: FAMILY MEDICINE

## 2022-07-07 PROCEDURE — 3017F COLORECTAL CA SCREEN DOC REV: CPT | Performed by: FAMILY MEDICINE

## 2022-07-07 PROCEDURE — 1036F TOBACCO NON-USER: CPT | Performed by: FAMILY MEDICINE

## 2022-07-07 PROCEDURE — G8417 CALC BMI ABV UP PARAM F/U: HCPCS | Performed by: FAMILY MEDICINE

## 2022-07-07 PROCEDURE — G8428 CUR MEDS NOT DOCUMENT: HCPCS | Performed by: FAMILY MEDICINE

## 2022-07-07 PROCEDURE — 1123F ACP DISCUSS/DSCN MKR DOCD: CPT | Performed by: FAMILY MEDICINE

## 2022-07-07 RX ORDER — BACLOFEN 10 MG/1
10 TABLET ORAL NIGHTLY PRN
Qty: 30 TABLET | Refills: 1 | Status: SHIPPED | OUTPATIENT
Start: 2022-07-07 | End: 2022-07-29

## 2022-07-07 RX ORDER — PREDNISONE 20 MG/1
TABLET ORAL
Qty: 18 TABLET | Refills: 0 | Status: SHIPPED | OUTPATIENT
Start: 2022-07-07 | End: 2022-09-12 | Stop reason: ALTCHOICE

## 2022-07-07 NOTE — PROGRESS NOTES
Cayetano French is a 81 y.o. male    Chief Complaint   Patient presents with    Hand Pain     unbearable. HPI:    Arm Pain   The incident occurred more than 1 week ago. There was no injury mechanism. The pain is present in the upper left arm and upper right arm. Pertinent negatives include no numbness. The symptoms are aggravated by movement. He has tried NSAIDs for the symptoms. The treatment provided mild relief. Hand Pain   The incident occurred more than 1 week ago. There was no injury mechanism. The pain is present in the right wrist and left wrist. The quality of the pain is described as burning. The pain does not radiate. The pain is severe. The pain has been worsening since the incident. Pertinent negatives include no numbness. The symptoms are aggravated by movement. He has tried NSAIDs for the symptoms. The treatment provided mild relief. Hyperlipidemia  This is a chronic problem. The current episode started more than 1 year ago. The problem is controlled. Recent lipid tests were reviewed and are low. Exacerbating diseases include obesity. Associated symptoms include myalgias. Current antihyperlipidemic treatment includes statins. The current treatment provides significant improvement of lipids. There are no compliance problems. Risk factors for coronary artery disease include hypertension, male sex and obesity. ROS:    Review of Systems   Musculoskeletal: Positive for myalgias. Neurological: Negative for numbness. /74   Pulse 75   Wt 282 lb (127.9 kg)   SpO2 98%   BMI 36.21 kg/m²     Physical Exam:    Physical Exam  Constitutional:       General: He is not in acute distress. Appearance: Normal appearance. He is obese. He is not ill-appearing or toxic-appearing. HENT:      Head: Normocephalic. Neurological:      Mental Status: He is alert. Psychiatric:         Mood and Affect: Mood normal.         Behavior: Behavior normal.         Thought Content:  Thought content normal.         Current Outpatient Medications   Medication Sig Dispense Refill    baclofen (LIORESAL) 10 MG tablet Take 1 tablet by mouth nightly as needed (muscle spasms) 30 tablet 1    predniSONE (DELTASONE) 20 MG tablet Take 3 tablets daily for 3 days, then 2 tablets daily for 3 days, then 1 tablet daily for 3 days. Do not fill until desired. 18 tablet 0    rosuvastatin (CRESTOR) 20 MG tablet Take 20 mg by mouth daily      lisinopril-hydroCHLOROthiazide (PRINZIDE;ZESTORETIC) 20-25 MG per tablet TAKE ONE TABLET BY MOUTH DAILY 90 tablet 3    carvedilol (COREG) 3.125 MG tablet Take 1 tablet by mouth 2 times daily (with meals) 180 tablet 3    aspirin 81 MG chewable tablet Take 1 tablet by mouth daily 30 tablet 3     No current facility-administered medications for this visit. Assessment:    1. Strain of deltoid muscle, unspecified laterality, initial encounter    2. De Quervain's tenosynovitis    3. Left wrist pain    4. Mixed hyperlipidemia    5. Elevated C-reactive protein (CRP)        Plan:    1. Strain of deltoid muscle, unspecified laterality, initial encounter  Improved. Avoid NSAIDs while on the steroid taper. Look up exercises online please. I will give you prednisone steroid taper to take if symptoms come back in the future. - baclofen (LIORESAL) 10 MG tablet; Take 0.5-1 tablets by mouth nightly as needed (muscle spasms)  Dispense: 30 tablet; Refill: 0    2. De Quervain's tenosynovitis  Try the exercises and wear a brace and apply ice. 3. Left wrist pain  Unclear etiology but possibly carpal tunnel related. Encouraged to wear brace. Hopefully the steroid taper can help symptoms. - methylPREDNISolone (MEDROL DOSEPACK) 4 MG tablet; Take by mouth. Take with food. Dispense: 21 tablet; Refill: 0    4. Mixed hyperlipidemia  The Crestor might be a culprit. However he has been on it for over a year. His recent vitamin D levels were low normal so 2000 IU recommended daily. Discussed taking the Crestor every other day to see if symptoms improve as his recent lipids were stable. 5. Elevated C-reactive protein (CRP)  If still no improvement and if it progresses to become a chronic issue, I would recommend seeing rheumatology given the elevated CRP and ESR.  - Zulay Sullivan MD, Rheumatology, [facility]      Patient to return to clinic if symptoms worsen or fail to improve.

## 2022-07-29 DIAGNOSIS — S46.819A STRAIN OF DELTOID MUSCLE, UNSPECIFIED LATERALITY, INITIAL ENCOUNTER: ICD-10-CM

## 2022-07-29 RX ORDER — BACLOFEN 10 MG/1
TABLET ORAL
Qty: 30 TABLET | Refills: 1 | Status: SHIPPED | OUTPATIENT
Start: 2022-07-29

## 2022-07-29 NOTE — TELEPHONE ENCOUNTER
Refill Request     CONFIRM preferrred pharmacy with the patient. If Mail Order Rx - Pend for 90 day refill.       Last Seen: Last Seen Department: 7/7/2022  Last Seen by PCP: 7/7/2022    Last Written: 7/7/2022    Next Appointment:   Future Appointments   Date Time Provider Filippo Richard   8/1/2022 12:00 PM MD MARILY Carrera RHE[facility]   11/30/2022 10:00 AM DO Adeel Mendez [facility]   7/10/2023  8:30 AM SCHEDULE, YUE ALVARADO  [facility]  Jovany - DREW           Requested Prescriptions     Pending Prescriptions Disp Refills    baclofen (LIORESAL) 10 MG tablet [Pharmacy Med Name: BACLOFEN 10 MG TABLET] 30 tablet 1     Sig: TAKE ONE-HALF TO ONE TABLET BY MOUTH NIGHTLY AS NEEDED FOR MUSCLE SPASMS

## 2022-08-01 ENCOUNTER — OFFICE VISIT (OUTPATIENT)
Dept: RHEUMATOLOGY | Age: 72
End: 2022-08-01
Payer: MEDICARE

## 2022-08-01 VITALS
DIASTOLIC BLOOD PRESSURE: 76 MMHG | WEIGHT: 282 LBS | SYSTOLIC BLOOD PRESSURE: 120 MMHG | BODY MASS INDEX: 36.19 KG/M2 | HEIGHT: 74 IN

## 2022-08-01 DIAGNOSIS — M15.9 GENERALIZED OSTEOARTHRITIS: ICD-10-CM

## 2022-08-01 DIAGNOSIS — Z11.59 ENCOUNTER FOR SCREENING FOR OTHER VIRAL DISEASES: ICD-10-CM

## 2022-08-01 DIAGNOSIS — M35.3 PMR (POLYMYALGIA RHEUMATICA) (HCC): Primary | ICD-10-CM

## 2022-08-01 PROCEDURE — G8427 DOCREV CUR MEDS BY ELIG CLIN: HCPCS | Performed by: INTERNAL MEDICINE

## 2022-08-01 PROCEDURE — 1036F TOBACCO NON-USER: CPT | Performed by: INTERNAL MEDICINE

## 2022-08-01 PROCEDURE — 3017F COLORECTAL CA SCREEN DOC REV: CPT | Performed by: INTERNAL MEDICINE

## 2022-08-01 PROCEDURE — 99205 OFFICE O/P NEW HI 60 MIN: CPT | Performed by: INTERNAL MEDICINE

## 2022-08-01 PROCEDURE — G8417 CALC BMI ABV UP PARAM F/U: HCPCS | Performed by: INTERNAL MEDICINE

## 2022-08-01 PROCEDURE — 1123F ACP DISCUSS/DSCN MKR DOCD: CPT | Performed by: INTERNAL MEDICINE

## 2022-08-01 RX ORDER — PREDNISONE 1 MG/1
TABLET ORAL
Qty: 120 TABLET | Refills: 1 | Status: SHIPPED | OUTPATIENT
Start: 2022-08-01

## 2022-08-01 RX ORDER — PREDNISONE 1 MG/1
TABLET ORAL
Qty: 180 TABLET | Refills: 0 | Status: SHIPPED | OUTPATIENT
Start: 2022-08-01

## 2022-08-01 NOTE — PROGRESS NOTES
[address] [place], [place] [address], MD                                                           [address] [place] [address] [address], [address] [place] [address] [phone] (P) [phone] (F)      Note is transcribed using voice recognition software. Inadvertent computerized transcription errors may be present. Patient identification: shakira Vieyra : 1950,72 y.o. REASON FOR CONSULTATION:  Patient is being seen at the request of  Brad Sosa DO / Estee Meneses DO referred here for evaluation of musculoskeletal discomfort. HISTORY OF PRESENT ILLNESS:  81 y.o. male hypertension, coronary artery disease-stents, known history of osteoarthritis causing migratory musculoskeletal discomfort for 1-2 decades, states that he has been experiencing disabling discomfort in his arms, shoulder area, neck, hips, thighs and knees for last 3 to 4 months. Lately, wrist and finger joints were also affected with stiffness, discomfort. Symptoms are worse in the morning and after inactivity, AM stiffness 3 to 4 hours, symptom ease up with repetitive use by mid afternoon. He was given Medrol Dosepak last month that caused immediate resolution of his symptoms however symptoms returned as soon as he finished the Medrol Dosepak. He was then given 60 mg prednisone taper early 2022 however patient decided to take 10 mg of prednisone twice a day for couple of weeks, then stay on 10 mg of prednisone until past Friday with complete resolution of his symptoms. He has been off of prednisone for last 3 days, has been experiencing musculoskeletal discomfort again in PMR distribution. Very active physically, has large farm.   Prior to this, he used to get minor musculoskeletal discomfort, able to manage with acetaminophen as needed. Denies any symptoms of temporal arteritis. No infections. No rashes. Lab work-mildly elevated inflammatory markers, negative RF. All other ROS are negative. PMH, PSH,Social history , Meds reviewed. FH-no family history of autoimmune rheumatic disorders. PHYSICAL EXAM:    Vitals:    /76   Ht 6' 2\" (1.88 m)   Wt 282 lb (127.9 kg)   BMI 36.21 kg/m²   AA)x3 well nourished, and well groomed, normal judgement. MKS:   Subjective arthralgias in his shoulders, arms, thighs, knees, wrists. Full range of motion of peripheral joints including shoulders and hips. No appreciable tenderness, swelling, synovitis noted at this time. Asymptomatic OA changes across his DIPs, CMC's, knee joints. Skin: No rashes, no induration or skin thickening or nodules. HEENT: Normal lids, lacrimal glands and pupils. No oral or nasal ulcers. Salivary glands reveal no evidence of abnormality. External inspection of the ears and nose within normal limits. Normal temporal arteries without any tenderness or tortuosity. Neck: Supple no adenopathy. Chest: Normal effort  Heart:    Abdomen:   Lymph nodes:   Neurologic:     DATA:     See HPI  ASSESSMENT AND PLAN:  Lenny Raya was seen today for follow-up. Diagnoses and all orders for this visit:    PMR (polymyalgia rheumatica) (HCC)  -     Cyclic Citrul Peptide Antibody, IgG; Future  -     C-Reactive Protein; Future  -     Sedimentation Rate; Future  -     Hepatitis B Surface Antigen; Future  -     Hepatitis C Antibody; Future    Encounter for screening for other viral diseases   -     Hepatitis B Surface Antigen; Future    Generalized osteoarthritis    Other orders  -     predniSONE (DELTASONE) 1 MG tablet; Take  4 tab po daily. -     predniSONE (DELTASONE) 5 MG tablet; Take 2 tab po daily or 1 po BID which ever works the best for you. Suspect PMR based on history, labs and response to prednisone.     Generalized osteoarthritis-relatively asymptomatic at this time. Plan-  Explained diagnosis, management options, prognosis and risk of temporal arteritis. Labs prior to next visit. Recommend low-dose prednisone taper-start 10 mg a day, reduce 1 mg less every 10 to 14 days. Call with any new or worsening symptoms. Okay to take acetaminophen as needed if the need be. Advised patient to go to ER in case of vision loss, call with any temporal headaches or any concerning symptoms. RTC 6 weeks. #################################################################################################  Patient indicates understanding and agrees with the management plan. Total time >60 minutes that includes the following-  Preparing to see the patient such as reviewing patients records, pre-charting, preparing the visit on the same day, performing a medically appropriate history and physical examination, counseling and educating patient about diagnosis, management plan, ordering appropriate testings, prescriptions, communicating findings to other care providers, and documenting clinical information in electronic medical record. I thank you for giving me the opportunity to be involved in [employer] care and I look forward following Trudy Baker along with you. If you have any questions or concerns please feel free to contact me at any time.   Crystal Cadena MD 08/01/22

## 2022-08-01 NOTE — PATIENT INSTRUCTIONS
Polymyalgia Rheumatica        Polymyalgia rheumatica (sometimes referred to as PMR) is a common cause of widespread aching and stiffness in older adults. Because PMR does not often cause swollen joints, it may be hard to recognize. PMR may occur with another health problem, giant cell arteritis. Fast facts  PMR affects adults over the age of 48. Aching and stiffness in PMR affect the upper arms, neck, buttocks and thighs, and are most severe in the morning. These symptoms respond quickly and completely to low doses of corticosteroids. In PMR, the aching is located primarily around the shoulders and hips. What is polymyalgia rheumatica? The typical symptoms (what you feel) of PMR are aching and stiffness about the upper arms, neck, lower back and thighs. Symptoms tend to come on quickly, over a few days or weeks, and sometimes even overnight. Both sides of the body are equally affected. Involvement of the upper arms, with trouble raising them above the shoulders, is common. Sometimes, aching occurs at joints such as the hands and wrists. Achiness is always worse in the morning and improves as the day goes by. Yet inactivity, such as a long car ride or sitting too long in one position, may cause stiffness to return. Stiffness may be so severe that it causes any of these problems:  Disturbed sleep   Trouble getting dressed in the morning (for instance, putting on a jacket or bending over to pull on socks and shoes)   Problems getting up from a sofa or in and out of a car      What causes polymyalgia rheumatica? The cause of PMR is unknown. PMR does not result from side effects of medications. The abrupt onset of symptoms suggests the possibility of an infection but, so far, none has been found. \"Myalgia\" comes from the [place] word for \"muscle pain. \" However, specific tests of the muscles, such as a blood test for muscle enzymes or a muscle biopsy (surgical removal of a small piece of muscle for inspection under a microscope), are all normal.  Recent research suggests that inflammation in PMR involves the shoulder and hip joints themselves, and the bursae (or sacs) around these joints. So pains at the upper arms and thighs, in fact, start at the nearby shoulder and hip joints. This is what doctors call \"referred pain. \"  PMR should not be confused with fibromyalgia, a poorly understood health problem that affects mainly younger adults. Who gets polymyalgia rheumatica? PMR affects older adults over the age of 48. The average age at onset (start) of symptoms is 79, so people who have PMR may be in their [de-identified] or even older. The disease affects women somewhat more often than men. It is more frequent in whites than nonwhites, but all races can get PMR. How is polymyalgia rheumatica diagnosed? In PMR, results of blood tests to detect inflammation are most often abnormally high. One such test is the erythrocyte sedimentation rate, also called \"sed rate. \" Another test is the C-reactive protein, or CRP. Both tests may be very elevated in PMR but, in some patients, these tests may have normal or only slightly high results. PMR can be hard to diagnose. Your health care providers should rule out other health problems, such as rheumatoid arthritis. How is polymyalgia rheumatica treated? If your doctor strongly suspects PMR, you will receive a trial of low-dose corticosteroids. Often, the dose is 10-15 milligrams (shortened as mg) per day of prednisone (Deltasone, Orasone, etc.). If PMR is present, the medicine quickly relieves stiffness. The response to corticosteroids can be dramatic. Sometimes patients are better after only one dose. Improvement can be slower, though. But, if symptoms do not go away after 2 or 3 weeks of treatment, the diagnosis of PMR is not likely, and your doctor will consider other causes of your illness.   Nonsteroidal anti-inflammatory drugs (commonly called \"NSAIDs\"), such as ibuprofen (Advil, Motrin, etc.) and naproxen (Naprosyn, Aleve), are not effective in treating PMR. When your symptoms are under control, your doctor will slowly decrease (\"taper\") the dose of corticosteroid medicine. The goal is to find the lowest dose that keeps you comfortable. Some people can stop taking corticosteroids within a year. Others, though, will need a small amount of this medicine for 2-3 years, to keep aching and stiffness under control. Symptoms can recur. Because the symptoms of PMR are sensitive to even small changes in the dose of corticosteroids, your doctor should direct the gradual decrease of this medicine. Living with polymyalgia rheumatica  Once stiffness has gone away, you can resume all normal activities, including exercise. Even low doses of corticosteroids can cause side effects. These include higher blood sugar, weight gain, sleeplessness, osteoporosis (bone loss), cataracts, thinning of the skin and bruising. Checking for these problems, including bone density testing, is an important part of follow-up visits with your doctor. Older patients may need medicine to prevent osteoporosis. PMR can occur with a more serious condition, giant cell arteritis. Thus, see your doctor right away if you have PMR and you get symptoms of headache, changes in vision or fever. Points to remember  Once stiffness has gone away, you can resume normal activities. Aching and stiffness develop quickly in PMR, and are most common about the shoulders and upper arms. Symptoms are worse in the morning. Symptoms respond promptly to low doses of corticosteroids, but may recur as the dose is lowered. The rheumatologist's role in the treatment of polymyalgia rheumatica  PMR may be hard to diagnose. Because rheumatologists are experts in diseases of the joints, muscles and bones they can recognize the diagnosis of PMR, and expertly manage its treatment.   To find a rheumatologist  For a list of rheumatologists in your area, click

## 2022-09-06 DIAGNOSIS — Z11.59 ENCOUNTER FOR SCREENING FOR OTHER VIRAL DISEASES: ICD-10-CM

## 2022-09-06 DIAGNOSIS — M35.3 PMR (POLYMYALGIA RHEUMATICA) (HCC): ICD-10-CM

## 2022-09-06 LAB
C-REACTIVE PROTEIN: <3 MG/L (ref 0–5.1)
HEPATITIS B SURFACE ANTIGEN INTERPRETATION: NORMAL
HEPATITIS C ANTIBODY INTERPRETATION: NORMAL
SEDIMENTATION RATE, ERYTHROCYTE: 27 MM/HR (ref 0–20)

## 2022-09-07 LAB — CYCLIC CITRULLINATED PEPTIDE ANTIBODY IGG: <0.5 U/ML (ref 0–2.9)

## 2022-09-09 NOTE — PROGRESS NOTES
[address], [place]                                                           [address] [place] [address], [address] [place] [address] [phone] (P) [phone] (F)      Note is transcribed using voice recognition software. Inadvertent computerized transcription errors may be present. Patient identification: Zachary Erwin male : 1950,72 y.o. [id number] for polymyalgia rheumatica. Other medical comorbidities-systemic hypertension, coronary artery disease-stents, generalized osteoarthritis  He started 5 mg prednisone twice a day in last visit 6 weeks ago, is completely asymptomatic now. States that he responded right away in a day or 2. He is very active physically, gets minor arthralgias, for which he takes Tylenol arthritis as needed. In terms of PMR, he is pleased on prednisone. He has not tapered prednisone, concerned that his symptoms could return. Denies any symptoms of temporal arteritis. No other complaints or concerns. All other ROS are negative. PMH, PSH,Social history , Meds reviewed. FH-no family history of autoimmune rheumatic disorders. PHYSICAL EXAM:    Vitals:    /76   Ht 6' 2\" (1.88 m)   Wt 277 lb (125.6 kg)   BMI 35.56 kg/m²   AA)x3 well nourished, and well groomed, normal judgement. MKS:   Normal musculoskeletal examination and upper, lower extremities other than    Asymptomatic OA changes across his DIPs, CMC's, knee joints. F ROM in shoulders, hips. Skin: No rashes, no induration or skin thickening or nodules. HEENT: Normal lids, lacrimal glands and pupils. No oral or nasal ulcers. Salivary glands reveal no evidence of abnormality.  External inspection of the ears and nose within normal limits. Normal temporal arteries without any tenderness or tortuosity. DATA:   Lab Results   Component Value Date    CRP <3.0 09/06/2022     Lab Results   Component Value Date    SEDRATE 27 (H) 09/06/2022     Lab Results   Component Value Date    WBC 6.3 05/19/2022    HGB 15.0 05/19/2022    HCT 45.6 05/19/2022    MCV 88.2 05/19/2022     05/19/2022     RF, CCP negative         ASSESSMENT AND PLAN:  Antoinette Ventura was seen today for follow-up. Diagnoses and all orders for this visit:    PMR (polymyalgia rheumatica) (HCC)    Generalized osteoarthritis    PMR-asymptomatic. Recommend tapering prednisone-1 mg less every 14 days. Prescription updated. Generalized osteoarthritis-relatively asymptomatic at this time. Okay to take acetaminophen as needed if the need be. DEXA scan for bone health. Calcium vitamin D supplementation-1000 IU calcium, 2000 IU vitamin D daily. Advised patient to go to ER in case of vision loss, call with any temporal headaches or any concerning symptoms. RTC 6 weeks. #################################################################################################  Patient indicates understanding and agrees with the management plan. Total time 32 minutes that includes the following-  Preparing to see the patient such as reviewing patients records, pre-charting, preparing the visit on the same day, performing a medically appropriate history and physical examination, counseling and educating patient about diagnosis, management plan, ordering appropriate testings, prescriptions, communicating findings to other care providers, and documenting clinical information in electronic medical record. I thank you for giving me the opportunity to be involved in [facility] care and I look forward following Antoinette Ventura along with you. If you have any questions or concerns please feel free to contact me at any time.   Juan Mendosa MD 09/12/22

## 2022-09-12 ENCOUNTER — OFFICE VISIT (OUTPATIENT)
Dept: RHEUMATOLOGY | Age: 72
End: 2022-09-12
Payer: MEDICARE

## 2022-09-12 VITALS
WEIGHT: 277 LBS | SYSTOLIC BLOOD PRESSURE: 120 MMHG | BODY MASS INDEX: 35.55 KG/M2 | HEIGHT: 74 IN | DIASTOLIC BLOOD PRESSURE: 76 MMHG

## 2022-09-12 DIAGNOSIS — Z79.52 CURRENT USE OF STEROID MEDICATION: ICD-10-CM

## 2022-09-12 DIAGNOSIS — M15.9 GENERALIZED OSTEOARTHRITIS: ICD-10-CM

## 2022-09-12 DIAGNOSIS — M81.0 SENILE OSTEOPOROSIS: ICD-10-CM

## 2022-09-12 DIAGNOSIS — M35.3 PMR (POLYMYALGIA RHEUMATICA) (HCC): Primary | ICD-10-CM

## 2022-09-12 PROCEDURE — 99214 OFFICE O/P EST MOD 30 MIN: CPT | Performed by: INTERNAL MEDICINE

## 2022-09-12 PROCEDURE — 1123F ACP DISCUSS/DSCN MKR DOCD: CPT | Performed by: INTERNAL MEDICINE

## 2022-09-12 PROCEDURE — G8417 CALC BMI ABV UP PARAM F/U: HCPCS | Performed by: INTERNAL MEDICINE

## 2022-09-12 PROCEDURE — 3017F COLORECTAL CA SCREEN DOC REV: CPT | Performed by: INTERNAL MEDICINE

## 2022-09-12 PROCEDURE — G8427 DOCREV CUR MEDS BY ELIG CLIN: HCPCS | Performed by: INTERNAL MEDICINE

## 2022-09-12 PROCEDURE — 1036F TOBACCO NON-USER: CPT | Performed by: INTERNAL MEDICINE

## 2022-11-08 RX ORDER — PREDNISONE 1 MG/1
TABLET ORAL
Qty: 90 TABLET | Refills: 0 | Status: SHIPPED | OUTPATIENT
Start: 2022-11-08

## 2022-11-08 RX ORDER — PREDNISONE 1 MG/1
TABLET ORAL
Qty: 120 TABLET | Refills: 1 | OUTPATIENT
Start: 2022-11-08

## 2022-11-08 RX ORDER — PREDNISONE 1 MG/1
TABLET ORAL
Qty: 120 TABLET | Refills: 1 | Status: SHIPPED | OUTPATIENT
Start: 2022-11-08

## 2022-11-08 RX ORDER — PREDNISONE 20 MG/1
TABLET ORAL
Qty: 120 TABLET | Refills: 1 | OUTPATIENT
Start: 2022-11-08

## 2022-11-08 RX ORDER — PREDNISONE 1 MG/1
TABLET ORAL
Qty: 180 TABLET | Refills: 0 | OUTPATIENT
Start: 2022-11-08

## 2022-11-08 NOTE — TELEPHONE ENCOUNTER
Refill Request     CONFIRM preferrred pharmacy with the patient. If Mail Order Rx - Pend for 90 day refill. Last Seen: Last Seen Department: 7/7/2022  Last Seen by PCP: 7/7/2022    Last Written: 08/01/2022 120 tablet 1 refills     If no future appointment scheduled, route STAFF MESSAGE with patient name to the [facility] for scheduling. Next Appointment:   Future Appointments   Date Time Provider Filippo Richard   11/30/2022 10:00 AM Noel Lord DO [facility]   12/12/2022  1:30 PM Long Ramos MD KN[facility]   7/10/2023  8:30 AM SCHEDULE, [facility]  [place] [facility]       Message sent to [address] [place] [address] to schedule appt with patient?   N/A      Requested Prescriptions     Pending Prescriptions Disp Refills    predniSONE (DELTASONE) 20 MG tablet [Pharmacy Med Name: predniSONE 20 MG TABLET] 18 tablet 0     Sig: TAKE THREE TABLETS BY MOUTH DAILY FOR 3 DAYS, THEN TAKE TWO TABLETS BY MOUTH DAILY FOR 3 DAYS, THEN TAKE ONE TABLET BY MOUTH DAILY FOR 3 DAYS

## 2022-11-08 NOTE — TELEPHONE ENCOUNTER
Pt is currently on both 1 mg and 5 mg. His taper has him currently on 8 mg daily, but he will decrease to 7 in 2 weeks. Please refill both, pt needs them.

## 2022-11-16 ENCOUNTER — HOSPITAL ENCOUNTER (OUTPATIENT)
Dept: GENERAL RADIOLOGY | Age: 72
Discharge: HOME | End: 2022-11-16
Payer: MEDICARE

## 2022-11-16 DIAGNOSIS — M81.0 SENILE OSTEOPOROSIS: ICD-10-CM

## 2022-11-16 PROCEDURE — 77080 DXA BONE DENSITY AXIAL: CPT

## 2022-11-28 ASSESSMENT — ENCOUNTER SYMPTOMS
DIARRHEA: 0
RHINORRHEA: 0
PHOTOPHOBIA: 0
ABDOMINAL PAIN: 0
BLOOD IN STOOL: 0
SHORTNESS OF BREATH: 0
CONSTIPATION: 0
SORE THROAT: 0
EYE PAIN: 0
NAUSEA: 0
VOMITING: 0
COUGH: 0

## 2022-11-28 NOTE — PROGRESS NOTES
[address] [facility]   Cardiovascular Evaluation    PATIENT: Cleveland Coleman  DATE: 2022  MRN: [id number]  [facility]: [id number]  : 1950      Primary Care Doctor: Arline Candelaria DO  Reason for evaluation:   Follow-up for CAD    Subjective:     Cleveland Coleman is a 81 y.o. male with a history of CAD s/p inferior STEMI and PCI to mid-distal RCA with overlapping Xience Zoie 4.0 x 38 mm and 4.0 x 33 mm FADIA (tapered to final diameter of 4.8 mm) on 21, atrial flutter s/p RFCA on 18 (no longer on anticoagulation), paroxysmal atrial tachycardia, PVCs, essential hypertension, hyperlipidemia, and obesity who presents for follow-up. The patient was admitted to [facility] from -21 with an acute anterior STEMI for which he underwent PCI to the mid-distal RCA with overlapping Xience Zoie 4.0 x 38 mm and 4.0 x 33 mm FADIA, tapered to final diameter of 4.8 mm. Angiography at that time was also notable for a 40% eccentric proximal LAD stenosis and 30% proximal LCx stenosis. TTE obtained during his admission showed an LVEF of 50% with no wall motion abnormalities, grade 1 diastolic dysfunction, normal RV size and systolic function, and mild mitral and tricuspid regurgitation. Today, the patient reports that he has recently been feeling well from a cardiac standpoint. He is now following with rheumatology and was diagnosed with polymyalgia rheumatica in August and is currently taking a prednisone taper. He says that his myalgias and arthralgias have improved, but he still does have some left arm pain. He says that he has noticed some weight gain since taking steroids. He denies any chest pain, shortness of breath, lightheadedness, dizziness, palpitations, or lower extremity edema. Past Medical History:  CAD, atrial flutter, paroxysmal atrial tachycardia, PVCs, hyperlipidemia, hypertension, polymyalgia rheumatica, obesity.     Surgical History:   has a past surgical history that includes Appendectomy; Dental surgery; and Cardiac surgery (05/2018). Social History:   reports that he has never smoked. He has never used smokeless tobacco. He reports current alcohol use. He reports that he does not use drugs. Family History:  Family history is notable for AAA in brother. Home Medications:  Reviewed and are listed in nursing record. and/or listed below  Current Outpatient Medications   Medication Sig Dispense Refill    predniSONE (DELTASONE) 5 MG tablet Take 1 tablet daily according to tapered directions 90 tablet 0    predniSONE (DELTASONE) 1 MG tablet Take  4 tab po daily. (Patient taking differently: Take  4 tab po daily.) 120 tablet 1    rosuvastatin (CRESTOR) 20 MG tablet Take 20 mg by mouth daily      lisinopril-hydroCHLOROthiazide (PRINZIDE;ZESTORETIC) 20-25 MG per tablet TAKE ONE TABLET BY MOUTH DAILY 90 tablet 3    carvedilol (COREG) 3.125 MG tablet Take 1 tablet by mouth 2 times daily (with meals) 180 tablet 3    aspirin 81 MG chewable tablet Take 1 tablet by mouth daily 30 tablet 3    baclofen (LIORESAL) 10 MG tablet TAKE ONE-HALF TO ONE TABLET BY MOUTH NIGHTLY AS NEEDED FOR MUSCLE SPASMS (Patient not taking: Reported on 11/30/2022) 30 tablet 1     No current facility-administered medications for this visit. Allergies:  Patient has no known allergies. Review of Systems:   A 14 point review of symptoms completed. Pertinent positives identified in the HPI, all other review of symptoms negative as below. Review of Systems   Constitutional:  Negative for chills and fever. HENT:  Negative for congestion, rhinorrhea and sore throat. Eyes:  Negative for photophobia, pain and visual disturbance. Respiratory:  Negative for cough and shortness of breath. Cardiovascular:  Negative for chest pain, palpitations and leg swelling. Gastrointestinal:  Negative for abdominal pain, blood in stool, constipation, diarrhea, nausea and vomiting. Endocrine: Negative for cold intolerance and heat intolerance. Genitourinary:  Negative for difficulty urinating, dysuria and hematuria. Musculoskeletal:  Positive for arthralgias and myalgias. Skin:  Negative for rash and wound. Allergic/Immunologic: Negative for environmental allergies and food allergies. Neurological:  Negative for dizziness, syncope and light-headedness. Hematological:  Negative for adenopathy. Does not bruise/bleed easily. Psychiatric/Behavioral:  Negative for dysphoric mood. The patient is not nervous/anxious. Objective:   PHYSICAL EXAM:    Vitals:    11/30/22 0937   BP: 114/70   Pulse: 71   SpO2: 97%    Weight: 286 lb (129.7 kg)     Wt Readings from Last 3 Encounters:   11/30/22 286 lb (129.7 kg)   09/12/22 277 lb (125.6 kg)   08/01/22 282 lb (127.9 kg)     General: Adult male in no acute distress. Pleasant and interactive on exam.  HEENT: Normocephalic, atraumatic, non-icteric, hearing intact, nares normal, mucous membranes moist.  Neck: No JVD. Heart: Regular rate and rhythm. Normal S1 and S2. No murmurs, gallops or rubs. Lungs: Normal respiratory effort. Clear to auscultation bilaterally. No wheezes, rales, or rhonchi. Abdomen: Soft, non-tender. Normoactive bowel sounds. No masses or organomegaly. Skin: No rashes, wounds, or lesions. Pulses: 2+ and symmetric. Extremities: No clubbing, cyanosis, or edema. Psych: Normal mood and affect. Neuro: Alert and oriented to person, place, and time. No focal deficits noted.       LABS   CBC:      Lab Results   Component Value Date/Time    WBC 6.3 05/19/2022 08:07 AM    RBC 5.17 05/19/2022 08:07 AM    HGB 15.0 05/19/2022 08:07 AM    HCT 45.6 05/19/2022 08:07 AM    MCV 88.2 05/19/2022 08:07 AM    RDW 15.0 05/19/2022 08:07 AM     05/19/2022 08:07 AM     CMP:  Lab Results   Component Value Date/Time     05/19/2022 08:07 AM    K 4.4 05/19/2022 08:07 AM    K 3.7 01/22/2021 05:29 AM     05/19/2022 08:07 AM CO2 29 05/19/2022 08:07 AM    BUN 25 05/19/2022 08:07 AM    CREATININE 0.8 05/19/2022 08:07 AM    GFRAA >60 05/19/2022 08:07 AM    GFRAA >60 09/18/2012 11:07 AM    AGRATIO 1.8 05/19/2022 08:07 AM    LABGLOM >60 05/19/2022 08:07 AM    GLUCOSE 132 05/19/2022 08:07 AM    PROT 7.0 05/19/2022 08:07 AM    PROT 6.7 09/18/2012 11:07 AM    CALCIUM 9.3 05/19/2022 08:07 AM    BILITOT 0.3 05/19/2022 08:07 AM    ALKPHOS 55 05/19/2022 08:07 AM    AST 14 05/19/2022 08:07 AM    ALT 14 05/19/2022 08:07 AM     PT/INR:   No results found for: PTINR  Liver:  No components found for: CHLPL  Lab Results   Component Value Date    ALT 14 05/19/2022    AST 14 (L) 05/19/2022    ALKPHOS 55 05/19/2022    BILITOT 0.3 05/19/2022     Lab Results   Component Value Date    LABA1C 6.3 05/19/2022     Lipids:         Lab Results   Component Value Date    TRIG 69 05/19/2022    TRIG 143 01/22/2021    TRIG 101 11/23/2020            Lab Results   Component Value Date    HDL 33 (L) 05/19/2022    HDL 31 (L) 01/22/2021    HDL 38 (L) 11/23/2020            Lab Results   Component Value Date    LDLCALC 75 05/19/2022    LDLCALC 70 01/22/2021    LDLCALC 101 (H) 11/23/2020            Lab Results   Component Value Date    LABVLDL 14 05/19/2022    LABVLDL 29 01/22/2021    LABVLDL 20 11/23/2020         CARDIAC DATA     ECHO:   TTE 1/22/2021:   Summary   Technically difficult study due to poor acoustic windows, definity is used   for myocardial border enhancement. Left ventricular systolic function is low normal with a visually estimated   ejection fraction of 50%. Grade I diastolic dysfunction with normal filling pressure. Mild mitral and tricuspid regurgitation. Systolic pulmonary artery pressure (SPAP) is normal and estimated at 24 mmHg   (right atrial pressure 8 mmHg).     STRESS TEST: N/A    CARDIAC CATH:   [facility] 1/21/2021:  Dominance: Right      LM: normal   LAD: 40% eccentric proximal disease  LCx: 30% proximal stenosis   RCA: 100% mid occlusion     LVEDP: 28 mmHg  LVEF: 50-55%     6 Fr JR 4 Guide with BMW wire and guideliner extension      3.5 x 12 mm predil (see log)     4.0 x 38 mm Xience FADIA extending to distal right   4.0 x 33 mm Xience FADIA overlapping previous stent proximally to mid vessel     5.0 x 20 mm NC Trek postdil (from 4.6 mm distally within stented segment to 4.8 proximally)     0% residual   Large RPDA and RPLB with ARLYN III Flow      EP study and aflutter ablation 5/28/2018 (Dr. Obinna Sullivan):  CONCLUSION:  1. Complex electrophysiology study with normal baseline conduction  parameters. 2.  Status post successful cavotricuspid isthmus ablation for  counterclockwise flutter. VASCULAR/OTHER IMAGING:  Non-contrast chest CT 1/23/2021:  FINDINGS:   Mediastinum: The heart and great vessels are normal in size. Calcified   atheromatous plaque and coronary calcifications are noted. No pericardial   effusion. No enlarged or suspicious-appearing lymph nodes are identified. Lungs/pleura: Linear opacities in the medial lingula, medial right middle   lobe and lung bases are noted consistent with subsegmental atelectasis or   scarring. Sequela of old granulomatous disease. No consolidation. No   evidence for edema. No effusion. The central airway is patent. Upper Abdomen: Tiny hypoattenuating liver lesions are noted, which may   represent cysts. No acute findings. Soft Tissues/Bones: No acute osseous abnormality identified. Multilevel   degenerative change in the lower cervical and lower thoracic spine. Assessment:     1. CAD - S/p acute inferior STEMI and PCI to mid-distal RCA with overlapping Xience Zoie 4.0 x 38 mm and 4.0 x 33 mm FADIA, tapered to final diameter of 4.8 mm on 1/21/21. TTE from that time showed preserved biventricular systolic function. Overall, patient remains stable from a cardiovascular standpoint, continues to deny angina, and reports good exercise tolerance. 2. Atrial flutter - S/p RFCA in 2018.   No longer on anticoagulation. Follows with EP. 3. Polymyalgia rheumatica - Following with rheumatology and currently on steroid taper. 4. Paroxysmal atrial tachycardia  5. PVCs  6. Mild mitral regurgitation  7. Mild tricuspid regurgitation  8. Essential hypertension  9. Hyperlipidemia  10. Obesity    Plan:     1. Continue aspirin 81 mg daily, rosuvastatin 20 mg daily, coreg 3.125 mg BID, and lisinopril-HCTZ 20-25 mg daily. 2. He was diagnosed with polymyalgia rheumatica earlier this year and has been following with dermatology. He is currently on a prednisone taper and reports that his myalgias and arthralgias have improved. If there is concern in the future that his statin is leading to worsening myalgias/arthralgias, can consider transitioning to PCSK9 inhibitor. 3. Continue to encourage heart healthy, low sodium diet and regular physical exercise for at least 30 minutes a minimum of 3-5 times per week. 4. Follow-up with me in 6 months. Scribe's attestation: This note was scribed in the presence of Noel Garza DO by Laura Leone RN      It is a pleasure to assist in the care of Mj SCHUSTERLuly Carrerocatherine. Please call with any questions. The scribes documentation has been prepared under my direction and personally reviewed by me in its entirety. I confirm that the note above accurately reflects all work, treatment, procedures, and medical decision making performed by me. I, Dr. Kaley Santoro, personally performed the services described in this documentation as scribed by Laura Leone RN in my presence, and it is both accurate and complete to the best of our ability.        Kaley Santoro, [address] [facility]  [phone] [facility]  [phone] [facility]

## 2022-11-30 ENCOUNTER — OFFICE VISIT (OUTPATIENT)
Dept: CARDIOLOGY CLINIC | Age: 72
End: 2022-11-30

## 2022-11-30 VITALS
OXYGEN SATURATION: 97 % | SYSTOLIC BLOOD PRESSURE: 114 MMHG | DIASTOLIC BLOOD PRESSURE: 70 MMHG | HEIGHT: 74 IN | BODY MASS INDEX: 36.7 KG/M2 | HEART RATE: 71 BPM | WEIGHT: 286 LBS

## 2022-11-30 DIAGNOSIS — E66.01 MORBID OBESITY (HCC): ICD-10-CM

## 2022-11-30 DIAGNOSIS — I25.10 CORONARY ARTERY DISEASE INVOLVING NATIVE CORONARY ARTERY OF NATIVE HEART WITHOUT ANGINA PECTORIS: Primary | ICD-10-CM

## 2022-11-30 DIAGNOSIS — I47.1 PAROXYSMAL ATRIAL TACHYCARDIA (HCC): ICD-10-CM

## 2022-11-30 DIAGNOSIS — E78.5 HYPERLIPIDEMIA, UNSPECIFIED HYPERLIPIDEMIA TYPE: ICD-10-CM

## 2022-11-30 DIAGNOSIS — I48.92 ATRIAL FLUTTER, UNSPECIFIED TYPE (HCC): ICD-10-CM

## 2022-11-30 DIAGNOSIS — M35.3 POLYMYALGIA RHEUMATICA (HCC): ICD-10-CM

## 2022-11-30 DIAGNOSIS — I10 ESSENTIAL HYPERTENSION: ICD-10-CM

## 2022-11-30 RX ORDER — LISINOPRIL AND HYDROCHLOROTHIAZIDE 25; 20 MG/1; MG/1
TABLET ORAL
Qty: 90 TABLET | Refills: 3 | Status: SHIPPED | OUTPATIENT
Start: 2022-11-30

## 2022-11-30 RX ORDER — CARVEDILOL 3.12 MG/1
3.12 TABLET ORAL 2 TIMES DAILY WITH MEALS
Qty: 180 TABLET | Refills: 3 | Status: SHIPPED | OUTPATIENT
Start: 2022-11-30

## 2022-11-30 RX ORDER — ROSUVASTATIN CALCIUM 20 MG/1
20 TABLET, COATED ORAL DAILY
Qty: 90 TABLET | Refills: 3 | Status: SHIPPED | OUTPATIENT
Start: 2022-11-30

## 2022-11-30 NOTE — PATIENT INSTRUCTIONS
Plan:     1. Continue taking cardiac medications as prescribed  2. Discussed possibility of switching Crestor to a medication called Repatha (injectable medication) and can consider this in the future   3. Encourage a heart healthy diet  -limit intake of fatty foods, sweets, red meats, etc.   4. Recommend activity as tolerated  -encourage a minimum of 30 minutes of moderate exercise 3-5 times a week  5.  Follow-up with me in 6 months or sooner if needed

## 2022-12-12 ENCOUNTER — OFFICE VISIT (OUTPATIENT)
Dept: RHEUMATOLOGY | Age: 72
End: 2022-12-12
Payer: MEDICARE

## 2022-12-12 VITALS
HEIGHT: 74 IN | WEIGHT: 286 LBS | DIASTOLIC BLOOD PRESSURE: 80 MMHG | SYSTOLIC BLOOD PRESSURE: 120 MMHG | BODY MASS INDEX: 36.7 KG/M2

## 2022-12-12 DIAGNOSIS — M35.3 PMR (POLYMYALGIA RHEUMATICA) (HCC): Primary | ICD-10-CM

## 2022-12-12 DIAGNOSIS — M15.9 GENERALIZED OSTEOARTHRITIS: ICD-10-CM

## 2022-12-12 PROCEDURE — G8484 FLU IMMUNIZE NO ADMIN: HCPCS | Performed by: INTERNAL MEDICINE

## 2022-12-12 PROCEDURE — 3078F DIAST BP <80 MM HG: CPT | Performed by: INTERNAL MEDICINE

## 2022-12-12 PROCEDURE — 1036F TOBACCO NON-USER: CPT | Performed by: INTERNAL MEDICINE

## 2022-12-12 PROCEDURE — 1123F ACP DISCUSS/DSCN MKR DOCD: CPT | Performed by: INTERNAL MEDICINE

## 2022-12-12 PROCEDURE — G8427 DOCREV CUR MEDS BY ELIG CLIN: HCPCS | Performed by: INTERNAL MEDICINE

## 2022-12-12 PROCEDURE — G8417 CALC BMI ABV UP PARAM F/U: HCPCS | Performed by: INTERNAL MEDICINE

## 2022-12-12 PROCEDURE — 3074F SYST BP LT 130 MM HG: CPT | Performed by: INTERNAL MEDICINE

## 2022-12-12 PROCEDURE — 99214 OFFICE O/P EST MOD 30 MIN: CPT | Performed by: INTERNAL MEDICINE

## 2022-12-12 PROCEDURE — 3017F COLORECTAL CA SCREEN DOC REV: CPT | Performed by: INTERNAL MEDICINE

## 2022-12-12 NOTE — PROGRESS NOTES
[address], [place]                                                           [address] [place] [address] [phone] (P) [phone] (F)      Note is transcribed using voice recognition software. Inadvertent computerized transcription errors may be present. Patient identification: Mraia C Ruelas, male : 1950,72 y.o. [id number] for polymyalgia rheumatica. Other medical comorbidities-systemic hypertension, coronary artery disease-stents, generalized osteoarthritis    He is asymptomatic, currently taking 7 mg of prednisone daily. Normal ADLs and recreational activities. No side effects from medications. Denies any symptoms of temporal arteritis. No other complaints or concerns. All other ROS are negative. PMH, PSH,Social history , Meds reviewed. FH-no family history of autoimmune rheumatic disorders. PHYSICAL EXAM:    Vitals:    /80   Ht 6' 2\" (1.88 m)   Wt 286 lb (129.7 kg)   BMI 36.72 kg/m²   AA)x3 well nourished, and well groomed, normal judgement. MKS: Physical findings unchanged since last visit. Normal musculoskeletal examination and upper, lower extremities other than  Asymptomatic OA changes across his DIPs, CMC's, knee joints. F ROM in shoulders, hips. Skin: No rashes, no induration or skin thickening or nodules. HEENT:  Normal temporal arteries without any tenderness or tortuosity.     DATA:   Lab Results   Component Value Date    CRP <3.0 2022     Lab Results   Component Value Date    SEDRATE 27 (H) 2022     Lab Results   Component Value Date    WBC 6.3 2022    HGB 15.0 2022    HCT 45.6 2022    MCV 88.2 2022     05/19/2022     RF, CCP negative         ASSESSMENT AND PLAN:  Ward Vu was seen today for follow-up. Diagnoses and all orders for this visit:    PMR (polymyalgia rheumatica) (HCC)    Generalized osteoarthritis      PMR-asymptomatic. On prednisone 7.5 mg a day. Continue reducing 1 mg less every 14 days. Prescription refilled. Generalized osteoarthritis-minor symptoms. Lenoria Boozer to take acetaminophen as needed if the need be. DEXA scan for bone health. Calcium vitamin D supplementation-1000 IU calcium, 2000 IU vitamin D daily. Advised patient to go to ER in case of vision loss, call with any temporal headaches or any concerning symptoms. #################################################################################################  Patient indicates understanding and agrees with the management plan. Total time 32minutes that includes the following-  Preparing to see the patient such as reviewing patients records, pre-charting, preparing the visit on the same day, performing a medically appropriate history and physical examination, counseling and educating patient about diagnosis, management plan, ordering appropriate testings, prescriptions, communicating findings to other care providers, and documenting clinical information in electronic medical record. I thank you for giving me the opportunity to be involved in [facility] care and I look forward following Ward Vu along with you. If you have any questions or concerns please feel free to contact me at any time.   Lorraine Laureano MD 12/13/22

## 2023-02-16 RX ORDER — PREDNISONE 1 MG/1
TABLET ORAL
Qty: 90 TABLET | Refills: 0 | Status: SHIPPED | OUTPATIENT
Start: 2023-02-16

## 2023-02-16 NOTE — TELEPHONE ENCOUNTER
Pt called requesting refill on Prednisone 5 mg tablets. Pt currently is taking 6 mg daily, but has run out of 5 mg.  Rx has been pended for approval

## 2023-06-14 PROBLEM — I47.1 PAROXYSMAL ATRIAL TACHYCARDIA (HCC): Status: ACTIVE | Noted: 2023-06-14

## 2023-06-14 PROBLEM — E66.01 SEVERE OBESITY (BMI 35.0-39.9) WITH COMORBIDITY (HCC): Status: ACTIVE | Noted: 2023-06-14

## 2023-06-14 PROBLEM — I47.19 PAROXYSMAL ATRIAL TACHYCARDIA: Status: ACTIVE | Noted: 2023-06-14

## 2023-07-10 ENCOUNTER — TELEMEDICINE (OUTPATIENT)
Dept: FAMILY MEDICINE CLINIC | Age: 73
End: 2023-07-10
Payer: MEDICARE

## 2023-07-10 DIAGNOSIS — Z00.00 MEDICARE ANNUAL WELLNESS VISIT, SUBSEQUENT: Primary | ICD-10-CM

## 2023-07-10 PROCEDURE — 1123F ACP DISCUSS/DSCN MKR DOCD: CPT | Performed by: FAMILY MEDICINE

## 2023-07-10 PROCEDURE — 3017F COLORECTAL CA SCREEN DOC REV: CPT | Performed by: FAMILY MEDICINE

## 2023-07-10 PROCEDURE — G0439 PPPS, SUBSEQ VISIT: HCPCS | Performed by: FAMILY MEDICINE

## 2023-07-10 ASSESSMENT — PATIENT HEALTH QUESTIONNAIRE - PHQ9
SUM OF ALL RESPONSES TO PHQ QUESTIONS 1-9: 0
1. LITTLE INTEREST OR PLEASURE IN DOING THINGS: 0
SUM OF ALL RESPONSES TO PHQ QUESTIONS 1-9: 0
SUM OF ALL RESPONSES TO PHQ9 QUESTIONS 1 & 2: 0
2. FEELING DOWN, DEPRESSED OR HOPELESS: 0

## 2023-07-10 ASSESSMENT — LIFESTYLE VARIABLES
HOW OFTEN DO YOU HAVE A DRINK CONTAINING ALCOHOL: 2-3 TIMES A WEEK
HOW MANY STANDARD DRINKS CONTAINING ALCOHOL DO YOU HAVE ON A TYPICAL DAY: 1 OR 2

## 2023-07-10 NOTE — PATIENT INSTRUCTIONS
Personalized Preventive Plan for Marc Duet - 7/10/2023  Medicare offers a range of preventive health benefits. Some of the tests and screenings are paid in full while other may be subject to a deductible, co-insurance, and/or copay. Some of these benefits include a comprehensive review of your medical history including lifestyle, illnesses that may run in your family, and various assessments and screenings as appropriate. After reviewing your medical record and screening and assessments performed today your provider may have ordered immunizations, labs, imaging, and/or referrals for you. A list of these orders (if applicable) as well as your Preventive Care list are included within your After Visit Summary for your review. Other Preventive Recommendations:    A preventive eye exam performed by an eye specialist is recommended every 1-2 years to screen for glaucoma; cataracts, macular degeneration, and other eye disorders. A preventive dental visit is recommended every 6 months. Try to get at least 150 minutes of exercise per week or 10,000 steps per day on a pedometer . Order or download the FREE \"Exercise & Physical Activity: Your Everyday Guide\" from [employer]. Call 9-743.966.5232 or search [employer] online. You need 2019-2730 mg of calcium and 2034-1528 IU of vitamin D per day. It is possible to meet your calcium requirement with diet alone, but a vitamin D supplement is usually necessary to meet this goal.  When exposed to the sun, use a sunscreen that protects against both UVA and UVB radiation with an SPF of 30 or greater. Reapply every 2 to 3 hours or after sweating, drying off with a towel, or swimming. Always wear a seat belt when traveling in a car. Always wear a helmet when riding a bicycle or motorcycle.

## 2023-07-10 NOTE — PROGRESS NOTES
Medicare Annual Wellness Visit    Deborah Bowman is here for Medicare AWV    Assessment & Plan   Medicare annual wellness visit, subsequent  Recommendations for Preventive Services Due: see orders and patient instructions/AVS.  Recommended screening schedule for the next 5-10 years is provided to the patient in written form: see Patient Instructions/AVS.     No follow-ups on file. Subjective       Patient's complete Health Risk Assessment and screening values have been reviewed and are found in Flowsheets. The following problems were reviewed today and where indicated follow up appointments were made and/or referrals ordered. Positive Risk Factor Screenings with Interventions:                 Weight and Activity:  Physical Activity: Sufficiently Active    Days of Exercise per Week: 7 days    Minutes of Exercise per Session: 60 min     On average, how many days per week do you engage in moderate to strenuous exercise (like a brisk walk)?: 7 days  Have you lost any weight without trying in the past 3 months?: No  There is no height or weight on file to calculate BMI. (!) Abnormal  Obesity Interventions:  See AVS for additional education material            Vision Screen:  Do you have difficulty driving, watching TV, or doing any of your daily activities because of your eyesight?: No  Have you had an eye exam within the past year?: (!) No  No results found. Interventions:   Patient encouraged to make appointment with their eye specialist      Advanced Directives:  Do you have a Living Will?: (!) No    Intervention:  has NO advanced directive - information provided                       Objective      Patient-Reported Vitals  Patient-Reported Weight: 280LB  Patient-Reported Height: 6' 2\"       Patient did not monitor blood pressure at home       No Known Allergies  Prior to Visit Medications    Medication Sig Taking?  Authorizing Provider   carvedilol (COREG) 3.125 MG tablet Take 1 tablet by mouth 2 times

## 2023-07-12 ENCOUNTER — OFFICE VISIT (OUTPATIENT)
Dept: FAMILY MEDICINE CLINIC | Age: 73
End: 2023-07-12
Payer: MEDICARE

## 2023-07-12 VITALS
OXYGEN SATURATION: 98 % | HEIGHT: 74 IN | BODY MASS INDEX: 35.68 KG/M2 | DIASTOLIC BLOOD PRESSURE: 82 MMHG | SYSTOLIC BLOOD PRESSURE: 136 MMHG | HEART RATE: 65 BPM | WEIGHT: 278 LBS

## 2023-07-12 DIAGNOSIS — M35.3 POLYMYALGIA RHEUMATICA (HCC): ICD-10-CM

## 2023-07-12 DIAGNOSIS — Z12.11 COLON CANCER SCREENING: ICD-10-CM

## 2023-07-12 DIAGNOSIS — I10 ESSENTIAL HYPERTENSION: Primary | ICD-10-CM

## 2023-07-12 DIAGNOSIS — Z00.00 REGULAR CHECK-UP: ICD-10-CM

## 2023-07-12 DIAGNOSIS — Z12.5 SPECIAL SCREENING FOR MALIGNANT NEOPLASM OF PROSTATE: ICD-10-CM

## 2023-07-12 DIAGNOSIS — E78.2 MIXED HYPERLIPIDEMIA: ICD-10-CM

## 2023-07-12 DIAGNOSIS — I25.10 CORONARY ARTERY DISEASE INVOLVING NATIVE CORONARY ARTERY OF NATIVE HEART WITHOUT ANGINA PECTORIS: ICD-10-CM

## 2023-07-12 DIAGNOSIS — I10 ESSENTIAL HYPERTENSION: ICD-10-CM

## 2023-07-12 LAB
ALBUMIN SERPL-MCNC: 4.5 G/DL (ref 3.4–5)
ALBUMIN/GLOB SERPL: 1.7 {RATIO} (ref 1.1–2.2)
ALP SERPL-CCNC: 45 U/L (ref 40–129)
ALT SERPL-CCNC: 17 U/L (ref 10–40)
ANION GAP SERPL CALCULATED.3IONS-SCNC: 12 MMOL/L (ref 3–16)
AST SERPL-CCNC: 14 U/L (ref 15–37)
BASOPHILS # BLD: 0 K/UL (ref 0–0.2)
BASOPHILS NFR BLD: 0.5 %
BILIRUB SERPL-MCNC: 0.6 MG/DL (ref 0–1)
BUN SERPL-MCNC: 24 MG/DL (ref 7–20)
CALCIUM SERPL-MCNC: 9.7 MG/DL (ref 8.3–10.6)
CHLORIDE SERPL-SCNC: 103 MMOL/L (ref 99–110)
CHOLEST SERPL-MCNC: 139 MG/DL (ref 0–199)
CO2 SERPL-SCNC: 28 MMOL/L (ref 21–32)
CREAT SERPL-MCNC: 0.9 MG/DL (ref 0.8–1.3)
DEPRECATED RDW RBC AUTO: 14.4 % (ref 12.4–15.4)
EOSINOPHIL # BLD: 0.1 K/UL (ref 0–0.6)
EOSINOPHIL NFR BLD: 2.2 %
GFR SERPLBLD CREATININE-BSD FMLA CKD-EPI: >60 ML/MIN/{1.73_M2}
GLUCOSE SERPL-MCNC: 115 MG/DL (ref 70–99)
HBA1C MFR BLD: 6.1 %
HCT VFR BLD AUTO: 45 % (ref 40.5–52.5)
HDLC SERPL-MCNC: 39 MG/DL (ref 40–60)
HGB BLD-MCNC: 14.9 G/DL (ref 13.5–17.5)
LDLC SERPL CALC-MCNC: 79 MG/DL
LYMPHOCYTES # BLD: 1.6 K/UL (ref 1–5.1)
LYMPHOCYTES NFR BLD: 24.6 %
MCH RBC QN AUTO: 30.5 PG (ref 26–34)
MCHC RBC AUTO-ENTMCNC: 33.2 G/DL (ref 31–36)
MCV RBC AUTO: 92 FL (ref 80–100)
MONOCYTES # BLD: 0.5 K/UL (ref 0–1.3)
MONOCYTES NFR BLD: 7.6 %
NEUTROPHILS # BLD: 4.2 K/UL (ref 1.7–7.7)
NEUTROPHILS NFR BLD: 65.1 %
PLATELET # BLD AUTO: 165 K/UL (ref 135–450)
PMV BLD AUTO: 7.9 FL (ref 5–10.5)
POTASSIUM SERPL-SCNC: 4.1 MMOL/L (ref 3.5–5.1)
PROT SERPL-MCNC: 7.1 G/DL (ref 6.4–8.2)
PSA SERPL DL<=0.01 NG/ML-MCNC: 0.53 NG/ML (ref 0–4)
RBC # BLD AUTO: 4.9 M/UL (ref 4.2–5.9)
SODIUM SERPL-SCNC: 143 MMOL/L (ref 136–145)
TRIGL SERPL-MCNC: 104 MG/DL (ref 0–150)
VLDLC SERPL CALC-MCNC: 21 MG/DL
WBC # BLD AUTO: 6.5 K/UL (ref 4–11)

## 2023-07-12 PROCEDURE — 3079F DIAST BP 80-89 MM HG: CPT | Performed by: FAMILY MEDICINE

## 2023-07-12 PROCEDURE — 99214 OFFICE O/P EST MOD 30 MIN: CPT | Performed by: FAMILY MEDICINE

## 2023-07-12 PROCEDURE — 83037 HB GLYCOSYLATED A1C HOME DEV: CPT | Performed by: FAMILY MEDICINE

## 2023-07-12 PROCEDURE — 3017F COLORECTAL CA SCREEN DOC REV: CPT | Performed by: FAMILY MEDICINE

## 2023-07-12 PROCEDURE — G8427 DOCREV CUR MEDS BY ELIG CLIN: HCPCS | Performed by: FAMILY MEDICINE

## 2023-07-12 PROCEDURE — 3075F SYST BP GE 130 - 139MM HG: CPT | Performed by: FAMILY MEDICINE

## 2023-07-12 PROCEDURE — 1123F ACP DISCUSS/DSCN MKR DOCD: CPT | Performed by: FAMILY MEDICINE

## 2023-07-12 PROCEDURE — 1036F TOBACCO NON-USER: CPT | Performed by: FAMILY MEDICINE

## 2023-07-12 PROCEDURE — G8417 CALC BMI ABV UP PARAM F/U: HCPCS | Performed by: FAMILY MEDICINE

## 2023-07-12 ASSESSMENT — ENCOUNTER SYMPTOMS
BLOOD IN STOOL: 0
SORE THROAT: 0
ABDOMINAL PAIN: 0
CONSTIPATION: 0
CHEST TIGHTNESS: 0
COUGH: 0
SHORTNESS OF BREATH: 0
RHINORRHEA: 0

## 2023-07-12 ASSESSMENT — PATIENT HEALTH QUESTIONNAIRE - PHQ9
SUM OF ALL RESPONSES TO PHQ QUESTIONS 1-9: 0
1. LITTLE INTEREST OR PLEASURE IN DOING THINGS: 0
SUM OF ALL RESPONSES TO PHQ QUESTIONS 1-9: 0
SUM OF ALL RESPONSES TO PHQ QUESTIONS 1-9: 0
2. FEELING DOWN, DEPRESSED OR HOPELESS: 0
SUM OF ALL RESPONSES TO PHQ9 QUESTIONS 1 & 2: 0
DEPRESSION UNABLE TO ASSESS: FUNCTIONAL CAPACITY MOTIVATION LIMITS ACCURACY
SUM OF ALL RESPONSES TO PHQ QUESTIONS 1-9: 0

## 2023-07-12 NOTE — PROGRESS NOTES
Subjective:      Patient ID: Dieudonne Sauer is a 72 y.o. male. HPI  Patient in for checkup of several medical issues. I have not personally seen this patient for several years. Hypertension-blood pressure 140/80 or below when he checks at home or elsewhere. Coronary artery disease-had 2 stents in January 2021 and doing very well. Hyperlipidemia-on medication and due for testing today. Polymyalgia rheumatica-does see rheumatologist and is currently on tapering dose of prednisone. Doing very well. Colonoscopy-never done but will do referral today. Review of Systems    Review of Systems   Constitutional:  Negative for unexpected weight change. HENT:  Negative for congestion, postnasal drip, rhinorrhea and sore throat. Eyes:  Negative for visual disturbance. Respiratory:  Negative for cough, chest tightness and shortness of breath. Cardiovascular:  Negative for chest pain, palpitations and leg swelling. See HPI   Gastrointestinal:  Negative for abdominal pain, blood in stool and constipation. No gerd   Genitourinary:  Negative for dysuria, frequency and hematuria. No nocturia   Musculoskeletal:  Positive for arthralgias and myalgias. See hpi   Skin:  Negative for pallor and rash. Neurological:  Negative for tremors, syncope and headaches. Psychiatric/Behavioral:  Negative for sleep disturbance. The patient is not nervous/anxious. Objective:   Physical Exam      Physical Exam  Constitutional:       General: He is not in acute distress. Appearance: Normal appearance. He is well-developed. He is obese. He is not ill-appearing. HENT:      Head: Normocephalic. Mouth/Throat:      Mouth: Mucous membranes are moist.      Pharynx: Oropharynx is clear. Eyes:      Conjunctiva/sclera: Conjunctivae normal.   Neck:      Thyroid: No thyromegaly. Vascular: No carotid bruit. Cardiovascular:      Rate and Rhythm: Normal rate and regular rhythm.

## 2023-11-07 ENCOUNTER — E-VISIT (OUTPATIENT)
Dept: PRIMARY CARE CLINIC | Age: 73
End: 2023-11-07
Payer: MEDICARE

## 2023-11-07 DIAGNOSIS — U07.1 COVID-19: Primary | ICD-10-CM

## 2023-11-07 PROCEDURE — 99423 OL DIG E/M SVC 21+ MIN: CPT | Performed by: NURSE PRACTITIONER

## 2023-11-07 ASSESSMENT — LIFESTYLE VARIABLES: SMOKING_STATUS: NO, I HAVE NEVER SMOKED

## 2023-11-07 NOTE — PROGRESS NOTES
Marc Goncalves (1950) initiated an asynchronous digital communication through [facility]. HPI: per patient questionnaire     Exam: not applicable    Diagnoses and all orders for this visit:  Diagnoses and all orders for this visit:    [id number]    Other orders  -     nirmatrelvir/ritonavir 300/100 (PAXLOVID, 300/100,) 20 x 150 MG & 10 x 100MG TBPK; Take 3 tablets (two 150 mg nirmatrelvir and one 100 mg ritonavir tablets) by mouth every 12 hours for 5 days. + for covid. Labs stable. Hold statin. Sent paxlovid  Supportive care  Fu with pcp as needed     Time: EV3 - 21 or more minutes were spent on the digital evaluation and management of this patient. 22 min     CORY Almaraz - CNP

## 2023-12-11 ASSESSMENT — ENCOUNTER SYMPTOMS
DIARRHEA: 0
COUGH: 0
NAUSEA: 0
PHOTOPHOBIA: 0
EYE PAIN: 0
SORE THROAT: 0
RHINORRHEA: 0
CONSTIPATION: 0
SHORTNESS OF BREATH: 0
VOMITING: 0
ABDOMINAL PAIN: 0
BLOOD IN STOOL: 0

## 2023-12-11 NOTE — PROGRESS NOTES
syncope and light-headedness. Hematological:  Negative for adenopathy. Does not bruise/bleed easily. Psychiatric/Behavioral:  Negative for dysphoric mood. The patient is not nervous/anxious. Objective:   PHYSICAL EXAM:    Vitals:    12/13/23 1005   BP: 138/80   Pulse: 72   SpO2: 94%    Weight - Scale: 127.9 kg (282 lb)     Wt Readings from Last 3 Encounters:   12/13/23 127.9 kg (282 lb)   07/12/23 126.1 kg (278 lb)   06/14/23 127.9 kg (282 lb)     General: Adult male in no acute distress. Pleasant and interactive on exam.  HEENT: Normocephalic, atraumatic, non-icteric, hearing intact, nares normal, mucous membranes moist.  Neck: No JVD. Heart: Regular rate and rhythm. Normal S1 and S2. No murmurs, gallops or rubs. Lungs: Normal respiratory effort. Clear to auscultation bilaterally. No wheezes, rales, or rhonchi. Abdomen: Soft, non-tender. Normoactive bowel sounds. No masses or organomegaly. Skin: No rashes, wounds, or lesions. Pulses: Radial pulses 2+. Extremities: Trace bilateral LE edema. No clubbing or cyanosis. Psych: Normal mood and affect. Neuro: Alert and oriented to person, place, and time. No focal deficits noted.       LABS   CBC:      Lab Results   Component Value Date/Time    WBC 6.5 07/12/2023 10:08 AM    RBC 4.90 07/12/2023 10:08 AM    HGB 14.9 07/12/2023 10:08 AM    HCT 45.0 07/12/2023 10:08 AM    MCV 92.0 07/12/2023 10:08 AM    RDW 14.4 07/12/2023 10:08 AM     07/12/2023 10:08 AM     CMP:  Lab Results   Component Value Date/Time     07/12/2023 10:08 AM    K 4.1 07/12/2023 10:08 AM    K 3.7 01/22/2021 05:29 AM     07/12/2023 10:08 AM    CO2 28 07/12/2023 10:08 AM    BUN 24 07/12/2023 10:08 AM    CREATININE 0.9 07/12/2023 10:08 AM    GFRAA >60 05/19/2022 08:07 AM    GFRAA >60 09/18/2012 11:07 AM    AGRATIO 1.7 07/12/2023 10:08 AM    LABGLOM >60 07/12/2023 10:08 AM    GLUCOSE 115 07/12/2023 10:08 AM    PROT 7.1 07/12/2023 10:08 AM    PROT 6.7 09/18/2012 11:07 AM

## 2023-12-13 ENCOUNTER — OFFICE VISIT (OUTPATIENT)
Dept: CARDIOLOGY CLINIC | Age: 73
End: 2023-12-13
Payer: MEDICARE

## 2023-12-13 VITALS
HEART RATE: 72 BPM | OXYGEN SATURATION: 94 % | BODY MASS INDEX: 36.19 KG/M2 | DIASTOLIC BLOOD PRESSURE: 80 MMHG | HEIGHT: 74 IN | SYSTOLIC BLOOD PRESSURE: 138 MMHG | WEIGHT: 282 LBS

## 2023-12-13 DIAGNOSIS — I48.92 ATRIAL FLUTTER, UNSPECIFIED TYPE (HCC): Primary | ICD-10-CM

## 2023-12-13 DIAGNOSIS — E78.5 HYPERLIPIDEMIA, UNSPECIFIED HYPERLIPIDEMIA TYPE: ICD-10-CM

## 2023-12-13 DIAGNOSIS — E66.01 CLASS 2 SEVERE OBESITY DUE TO EXCESS CALORIES WITH SERIOUS COMORBIDITY AND BODY MASS INDEX (BMI) OF 36.0 TO 36.9 IN ADULT (HCC): ICD-10-CM

## 2023-12-13 DIAGNOSIS — I47.19 PAROXYSMAL ATRIAL TACHYCARDIA: ICD-10-CM

## 2023-12-13 DIAGNOSIS — I10 ESSENTIAL HYPERTENSION: ICD-10-CM

## 2023-12-13 DIAGNOSIS — M35.3 POLYMYALGIA RHEUMATICA (HCC): ICD-10-CM

## 2023-12-13 DIAGNOSIS — I25.10 CORONARY ARTERY DISEASE INVOLVING NATIVE CORONARY ARTERY OF NATIVE HEART WITHOUT ANGINA PECTORIS: ICD-10-CM

## 2023-12-13 DIAGNOSIS — I49.3 PVC'S (PREMATURE VENTRICULAR CONTRACTIONS): ICD-10-CM

## 2023-12-13 PROCEDURE — G8417 CALC BMI ABV UP PARAM F/U: HCPCS | Performed by: INTERNAL MEDICINE

## 2023-12-13 PROCEDURE — 99214 OFFICE O/P EST MOD 30 MIN: CPT | Performed by: INTERNAL MEDICINE

## 2023-12-13 PROCEDURE — 3075F SYST BP GE 130 - 139MM HG: CPT | Performed by: INTERNAL MEDICINE

## 2023-12-13 PROCEDURE — 1123F ACP DISCUSS/DSCN MKR DOCD: CPT | Performed by: INTERNAL MEDICINE

## 2023-12-13 PROCEDURE — 3079F DIAST BP 80-89 MM HG: CPT | Performed by: INTERNAL MEDICINE

## 2023-12-13 PROCEDURE — G8484 FLU IMMUNIZE NO ADMIN: HCPCS | Performed by: INTERNAL MEDICINE

## 2023-12-13 PROCEDURE — 1036F TOBACCO NON-USER: CPT | Performed by: INTERNAL MEDICINE

## 2023-12-13 PROCEDURE — G8427 DOCREV CUR MEDS BY ELIG CLIN: HCPCS | Performed by: INTERNAL MEDICINE

## 2023-12-13 PROCEDURE — 3017F COLORECTAL CA SCREEN DOC REV: CPT | Performed by: INTERNAL MEDICINE

## 2023-12-13 RX ORDER — ROSUVASTATIN CALCIUM 20 MG/1
40 TABLET, COATED ORAL DAILY
Qty: 180 TABLET | Refills: 3 | Status: SHIPPED | OUTPATIENT
Start: 2023-12-13

## 2023-12-13 RX ORDER — LISINOPRIL AND HYDROCHLOROTHIAZIDE 25; 20 MG/1; MG/1
TABLET ORAL
Qty: 90 TABLET | Refills: 3 | Status: SHIPPED | OUTPATIENT
Start: 2023-12-13

## 2023-12-13 RX ORDER — CARVEDILOL 3.12 MG/1
3.12 TABLET ORAL 2 TIMES DAILY WITH MEALS
Qty: 180 TABLET | Refills: 3 | Status: SHIPPED | OUTPATIENT
Start: 2023-12-13

## 2023-12-13 NOTE — PATIENT INSTRUCTIONS
1. Continue aspirin 81 mg daily, coreg 3.125 mg BID, and lisinopril-HCTZ 20-25 mg daily.   - Increase rosuvastatin to 40 mg daily,  2. Continue to encourage efforts at weight loss, heart healthy, low sodium diet, and regular physical exercise for at least 30 minutes a minimum of 3-5 times per week. 3. Follow-up with me in 6 months.

## 2024-02-12 ENCOUNTER — HOSPITAL ENCOUNTER (OUTPATIENT)
Age: 74
Discharge: HOME | End: 2024-02-12

## 2024-02-12 ENCOUNTER — HOSPITAL ENCOUNTER (OUTPATIENT)
Dept: GENERAL RADIOLOGY | Age: 74
Discharge: HOME | End: 2024-02-12

## 2024-02-12 DIAGNOSIS — M79.622 PAIN OF LEFT UPPER ARM: ICD-10-CM

## 2024-02-12 PROCEDURE — 73060 X-RAY EXAM OF HUMERUS: CPT

## 2024-06-06 NOTE — PROGRESS NOTES
smoked. He has never used smokeless tobacco. He reports current alcohol use. He reports that he does not use drugs.     Family History:  Family history is notable for AAA in brother.      Home Medications:  Reviewed and are listed in nursing record. and/or listed below  Current Outpatient Medications   Medication Sig Dispense Refill    carvedilol (COREG) 3.125 MG tablet Take 1 tablet by mouth 2 times daily (with meals) 180 tablet 3    lisinopril-hydroCHLOROthiazide (PRINZIDE;ZESTORETIC) 20-25 MG per tablet TAKE ONE TABLET BY MOUTH DAILY 90 tablet 3    rosuvastatin (CRESTOR) 20 MG tablet Take 2 tablets by mouth daily 180 tablet 3    predniSONE (DELTASONE) 1 MG tablet Take  4 tab po daily. (Patient taking differently: 2 tablets daily) 120 tablet 1    aspirin 81 MG chewable tablet Take 1 tablet by mouth daily 30 tablet 3    baclofen (LIORESAL) 10 MG tablet TAKE ONE-HALF TO ONE TABLET BY MOUTH NIGHTLY AS NEEDED FOR MUSCLE SPASMS (Patient not taking: Reported on 6/14/2023) 30 tablet 1     No current facility-administered medications for this visit.        Allergies:  Patient has no known allergies.     Review of Systems:   Negative except as noted above.      Objective:   PHYSICAL EXAM:    Vitals:    06/19/24 1019   BP: (!) 158/84   Pulse: 68   SpO2: 97% on room air    Weight - Scale: 129.5 kg (285 lb 8 oz)     Wt Readings from Last 3 Encounters:   06/19/24 129.5 kg (285 lb 8 oz)   12/13/23 127.9 kg (282 lb)   07/12/23 126.1 kg (278 lb)     General: Adult male in no acute distress. Pleasant and interactive on exam.  HEENT: Normocephalic, atraumatic, non-icteric, hearing intact, nares normal, mucous membranes moist.  Neck: No JVD.  Heart: Irregular rhythm with occasional ectopic beats. Normal S1 and S2. No murmurs, gallops or rubs.  Lungs: Normal respiratory effort. Clear to auscultation bilaterally. No wheezes, rales, or rhonchi.  Skin: No rashes, wounds, or lesions.  Pulses: Radial pulses 2+.  Extremities: No clubbing,

## 2024-06-19 ENCOUNTER — OFFICE VISIT (OUTPATIENT)
Dept: CARDIOLOGY CLINIC | Age: 74
End: 2024-06-19
Payer: MEDICARE

## 2024-06-19 VITALS
DIASTOLIC BLOOD PRESSURE: 78 MMHG | WEIGHT: 285.5 LBS | HEIGHT: 74 IN | OXYGEN SATURATION: 97 % | HEART RATE: 68 BPM | BODY MASS INDEX: 36.64 KG/M2 | SYSTOLIC BLOOD PRESSURE: 142 MMHG

## 2024-06-19 DIAGNOSIS — I10 ESSENTIAL HYPERTENSION: Primary | ICD-10-CM

## 2024-06-19 DIAGNOSIS — I25.10 CORONARY ARTERY DISEASE INVOLVING NATIVE CORONARY ARTERY OF NATIVE HEART WITHOUT ANGINA PECTORIS: ICD-10-CM

## 2024-06-19 DIAGNOSIS — Z79.899 MEDICATION MANAGEMENT: ICD-10-CM

## 2024-06-19 DIAGNOSIS — I48.92 ATRIAL FLUTTER, UNSPECIFIED TYPE (HCC): ICD-10-CM

## 2024-06-19 DIAGNOSIS — E78.5 HYPERLIPIDEMIA, UNSPECIFIED HYPERLIPIDEMIA TYPE: ICD-10-CM

## 2024-06-19 DIAGNOSIS — I49.3 PVC (PREMATURE VENTRICULAR CONTRACTION): ICD-10-CM

## 2024-06-19 DIAGNOSIS — M54.9 BACK PAIN, UNSPECIFIED BACK LOCATION, UNSPECIFIED BACK PAIN LATERALITY, UNSPECIFIED CHRONICITY: ICD-10-CM

## 2024-06-19 DIAGNOSIS — M35.3 POLYMYALGIA RHEUMATICA (HCC): ICD-10-CM

## 2024-06-19 PROCEDURE — 93000 ELECTROCARDIOGRAM COMPLETE: CPT | Performed by: INTERNAL MEDICINE

## 2024-06-19 PROCEDURE — 3078F DIAST BP <80 MM HG: CPT | Performed by: INTERNAL MEDICINE

## 2024-06-19 PROCEDURE — 3077F SYST BP >= 140 MM HG: CPT | Performed by: INTERNAL MEDICINE

## 2024-06-19 PROCEDURE — 1123F ACP DISCUSS/DSCN MKR DOCD: CPT | Performed by: INTERNAL MEDICINE

## 2024-06-19 PROCEDURE — 99214 OFFICE O/P EST MOD 30 MIN: CPT | Performed by: INTERNAL MEDICINE

## 2024-06-19 NOTE — PATIENT INSTRUCTIONS
Continue aspirin 81 mg daily, coreg 3.125 mg BID, lisinopril-HCTZ 20-25 mg daily, and rosuvastatin 30 mg daily.  Ordered fasting lipid level.   It was recommended you obtain a home BP machine and start checking your BP 2-3 times daily over the next 2-3 weeks and call back with your BP log.  If your BP is consistently elevated >130/80 on repeat evaluation, we will plan to make medication changes.  Continue to encourage heart healthy, low sodium diet, and regular physical exercise for at least 30 minutes a minimum of 3-5 times per week.   Follow-up with me in 6  months.

## 2024-07-17 ENCOUNTER — TELEPHONE (OUTPATIENT)
Dept: CARDIOLOGY CLINIC | Age: 74
End: 2024-07-17

## 2024-07-17 DIAGNOSIS — R00.2 PALPITATION: Primary | ICD-10-CM

## 2024-07-17 NOTE — TELEPHONE ENCOUNTER
PT contacted office requesting OV w/ [facility]. [facility] next available not until 09/04 in [place]. Pt states he is feeling \"little flutters\", and chest discomfort. PT states it is not bad enough to consider a pain. Pt is not SOB, he states when he takes BP machine reads irregular heart beat. PT states he does not want to go to ED unless absolutely necessary.

## 2024-07-17 NOTE — TELEPHONE ENCOUNTER
Lets please have him come in for an EKG and place a 2-week vital connect monitor.  If his palpitations become more frequent or longer in duration and/or his chest pain becomes more severe, I recommend that he go to the ED for more urgent evaluation.

## 2024-07-18 ENCOUNTER — HOSPITAL ENCOUNTER (EMERGENCY)
Age: 74
Discharge: HOME | End: 2024-07-19
Attending: STUDENT IN AN ORGANIZED HEALTH CARE EDUCATION/TRAINING PROGRAM
Payer: MEDICARE

## 2024-07-18 ENCOUNTER — APPOINTMENT (OUTPATIENT)
Dept: GENERAL RADIOLOGY | Age: 74
End: 2024-07-18
Payer: MEDICARE

## 2024-07-18 DIAGNOSIS — R00.2 PALPITATIONS: Primary | ICD-10-CM

## 2024-07-18 LAB
ALBUMIN SERPL-MCNC: 4.4 G/DL (ref 3.4–5)
ALBUMIN/GLOB SERPL: 1.4 {RATIO} (ref 1.1–2.2)
ALP SERPL-CCNC: 48 U/L (ref 40–129)
ALT SERPL-CCNC: 22 U/L (ref 10–40)
ANION GAP SERPL CALCULATED.3IONS-SCNC: 10 MMOL/L (ref 3–16)
AST SERPL-CCNC: 16 U/L (ref 15–37)
BASE EXCESS BLDV CALC-SCNC: 1.6 MMOL/L (ref -3–3)
BASOPHILS # BLD: 0.1 K/UL (ref 0–0.2)
BASOPHILS NFR BLD: 1.4 %
BILIRUB SERPL-MCNC: 0.5 MG/DL (ref 0–1)
BUN SERPL-MCNC: 22 MG/DL (ref 7–20)
CALCIUM SERPL-MCNC: 9.2 MG/DL (ref 8.3–10.6)
CHLORIDE SERPL-SCNC: 101 MMOL/L (ref 99–110)
CO2 BLDV-SCNC: 29 MMOL/L
CO2 SERPL-SCNC: 29 MMOL/L (ref 21–32)
COHGB MFR BLDV: 2.1 % (ref 0–1.5)
CREAT SERPL-MCNC: 0.8 MG/DL (ref 0.8–1.3)
DEPRECATED RDW RBC AUTO: 13.9 % (ref 12.4–15.4)
EOSINOPHIL # BLD: 0.1 K/UL (ref 0–0.6)
EOSINOPHIL NFR BLD: 1.4 %
GFR SERPLBLD CREATININE-BSD FMLA CKD-EPI: >90 ML/MIN/{1.73_M2}
GLUCOSE SERPL-MCNC: 108 MG/DL (ref 70–99)
HCO3 BLDV-SCNC: 27.8 MMOL/L (ref 23–29)
HCT VFR BLD AUTO: 44 % (ref 40.5–52.5)
HGB BLD-MCNC: 14.6 G/DL (ref 13.5–17.5)
LYMPHOCYTES # BLD: 2 K/UL (ref 1–5.1)
LYMPHOCYTES NFR BLD: 22.7 %
MAGNESIUM SERPL-MCNC: 1.8 MG/DL (ref 1.8–2.4)
MCH RBC QN AUTO: 30.1 PG (ref 26–34)
MCHC RBC AUTO-ENTMCNC: 33.2 G/DL (ref 31–36)
MCV RBC AUTO: 90.4 FL (ref 80–100)
METHGB MFR BLDV: 0.3 %
MONOCYTES # BLD: 0.4 K/UL (ref 0–1.3)
MONOCYTES NFR BLD: 4.9 %
NEUTROPHILS # BLD: 6.3 K/UL (ref 1.7–7.7)
NEUTROPHILS NFR BLD: 69.6 %
NT-PROBNP SERPL-MCNC: 100 PG/ML (ref 0–124)
O2 THERAPY: ABNORMAL
PCO2 BLDV: 49.4 MMHG (ref 40–50)
PH BLDV: 7.37 [PH] (ref 7.35–7.45)
PLATELET # BLD AUTO: 200 K/UL (ref 135–450)
PMV BLD AUTO: 7.5 FL (ref 5–10.5)
PO2 BLDV: 29.6 MMHG (ref 25–40)
POTASSIUM SERPL-SCNC: 3.5 MMOL/L (ref 3.5–5.1)
PROT SERPL-MCNC: 7.5 G/DL (ref 6.4–8.2)
RBC # BLD AUTO: 4.87 M/UL (ref 4.2–5.9)
SAO2 % BLDV: 50 %
SODIUM SERPL-SCNC: 140 MMOL/L (ref 136–145)
TROPONIN, HIGH SENSITIVITY: 8 NG/L (ref 0–22)
TROPONIN, HIGH SENSITIVITY: 8 NG/L (ref 0–22)
WBC # BLD AUTO: 9 K/UL (ref 4–11)

## 2024-07-18 PROCEDURE — 36415 COLL VENOUS BLD VENIPUNCTURE: CPT

## 2024-07-18 PROCEDURE — 83880 ASSAY OF NATRIURETIC PEPTIDE: CPT

## 2024-07-18 PROCEDURE — 71046 X-RAY EXAM CHEST 2 VIEWS: CPT

## 2024-07-18 PROCEDURE — 85025 COMPLETE CBC W/AUTO DIFF WBC: CPT

## 2024-07-18 PROCEDURE — 84443 ASSAY THYROID STIM HORMONE: CPT

## 2024-07-18 PROCEDURE — 84484 ASSAY OF TROPONIN QUANT: CPT

## 2024-07-18 PROCEDURE — 83735 ASSAY OF MAGNESIUM: CPT

## 2024-07-18 PROCEDURE — 80053 COMPREHEN METABOLIC PANEL: CPT

## 2024-07-18 PROCEDURE — 93005 ELECTROCARDIOGRAM TRACING: CPT | Performed by: STUDENT IN AN ORGANIZED HEALTH CARE EDUCATION/TRAINING PROGRAM

## 2024-07-18 PROCEDURE — 99285 EMERGENCY DEPT VISIT HI MDM: CPT

## 2024-07-18 PROCEDURE — 82803 BLOOD GASES ANY COMBINATION: CPT

## 2024-07-18 ASSESSMENT — PAIN - FUNCTIONAL ASSESSMENT: PAIN_FUNCTIONAL_ASSESSMENT: NONE - DENIES PAIN

## 2024-07-18 NOTE — TELEPHONE ENCOUNTER
Spoke with pt and relayed [facility] message. Pt v/u. Monitor visit set for 7/19 4pm. 2 week Cardiac Monitor and EKG ordered.

## 2024-07-19 ENCOUNTER — ANCILLARY PROCEDURE (OUTPATIENT)
Dept: CARDIOLOGY CLINIC | Age: 74
End: 2024-07-19
Payer: MEDICARE

## 2024-07-19 ENCOUNTER — NURSE ONLY (OUTPATIENT)
Dept: CARDIOLOGY CLINIC | Age: 74
End: 2024-07-19
Payer: MEDICARE

## 2024-07-19 ENCOUNTER — TELEPHONE (OUTPATIENT)
Dept: FAMILY MEDICINE CLINIC | Age: 74
End: 2024-07-19

## 2024-07-19 ENCOUNTER — TELEPHONE (OUTPATIENT)
Dept: CARDIOLOGY CLINIC | Age: 74
End: 2024-07-19

## 2024-07-19 VITALS
HEART RATE: 63 BPM | SYSTOLIC BLOOD PRESSURE: 138 MMHG | RESPIRATION RATE: 14 BRPM | WEIGHT: 280 LBS | HEIGHT: 74 IN | OXYGEN SATURATION: 97 % | BODY MASS INDEX: 35.94 KG/M2 | DIASTOLIC BLOOD PRESSURE: 87 MMHG | TEMPERATURE: 98.2 F

## 2024-07-19 DIAGNOSIS — R00.2 PALPITATION: ICD-10-CM

## 2024-07-19 DIAGNOSIS — R07.9 CHEST PAIN, UNSPECIFIED TYPE: Primary | ICD-10-CM

## 2024-07-19 DIAGNOSIS — R00.2 PALPITATIONS: ICD-10-CM

## 2024-07-19 DIAGNOSIS — R00.2 PALPITATIONS: Primary | ICD-10-CM

## 2024-07-19 LAB
EKG ATRIAL RATE: 71 BPM
EKG DIAGNOSIS: NORMAL
EKG P AXIS: 40 DEGREES
EKG P-R INTERVAL: 152 MS
EKG Q-T INTERVAL: 398 MS
EKG QRS DURATION: 96 MS
EKG QTC CALCULATION (BAZETT): 432 MS
EKG R AXIS: 16 DEGREES
EKG T AXIS: 40 DEGREES
EKG VENTRICULAR RATE: 71 BPM
TSH SERPL DL<=0.005 MIU/L-ACNC: 2.99 UIU/ML (ref 0.27–4.2)

## 2024-07-19 PROCEDURE — 93000 ELECTROCARDIOGRAM COMPLETE: CPT | Performed by: INTERNAL MEDICINE

## 2024-07-19 PROCEDURE — 93010 ELECTROCARDIOGRAM REPORT: CPT | Performed by: INTERNAL MEDICINE

## 2024-07-19 NOTE — ED PROVIDER NOTES
[facility]  ED      CHIEF COMPLAINT  Palpitations (Patient reports palpitations for 2 weeks. Pt reports hx of 2 stents and a ablation for a flutter 2 years ago. Dr. Garza is his cardiologist, has a follow up appt tomorrow for holter monitor placements.)       HISTORY OF PRESENT ILLNESS  Mj Delgadillo is a 74 y.o. male  who presents to the ED complaining of palpitations, generalized fatigue and shortness of breath.  Patient states he is had some symptoms of increased palpitations for a couple of weeks, but has been feeling very weak and intermittently short of breath for the last few days.  Follows with Dr. Garza and has an appointment tomorrow.  States symptoms seem to be random, they are occasionally brought on by exertion, but he states that other times he can exert himself normally without shortness of breath or severe fatigue.  Has no chest pain associated with this.  No leg pain or swelling.  No orthopnea.    No other complaints, modifying factors or associated symptoms.     I have reviewed the following from the nursing documentation.    Past Medical History:   Diagnosis Date    Carotid stenosis, non-symptomatic 6/3/2015    Hyperlipidemia     Hypertension     Obesity 5/17/2018     Past Surgical History:   Procedure Laterality Date    APPENDECTOMY      CARDIAC SURGERY  05/2018    ablation     DENTAL SURGERY       Family History   Problem Relation Age of Onset    Heart Disease Brother         AAA rupture     Social History     Socioeconomic History    Marital status:      Spouse name: Not on file    Number of children: Not on file    Years of education: Not on file    Highest education level: Not on file   Occupational History    Not on file   Tobacco Use    Smoking status: Never    Smokeless tobacco: Never   Vaping Use    Vaping Use: Never used   Substance and Sexual Activity    Alcohol use: Yes     Comment: occ    Drug use: No    Sexual activity: Not on file   Other Topics Concern

## 2024-07-19 NOTE — TELEPHONE ENCOUNTER
ED Follow Up Call/ Schedule appt   ED: Laura Denis  Reason: Palpitations  Date:7/18/24    Appt scheduled: N/A       Comments: Left voice message to call the office back regarding ED follow up, patient followed up with Cardio today.    Future Appointments   Date Time Provider Department Center   7/19/2024  4:00 PM [facility]   12/18/2024 11:15 AM Noel Garza DO [facility]

## 2024-07-19 NOTE — DISCHARGE INSTRUCTIONS
Return the nearest ED if you develop severe chest pain, shortness of breath, other concerning symptoms.  Follow-up with Dr. Garza as you previously planned tomorrow.

## 2024-07-19 NOTE — TELEPHONE ENCOUNTER
Monitor placed by Lore CONNER  Monitor company [employer]  Length of monitor 14 days  Monitor ordered by agreement24 [email or web address]  Serial number [id number]  Kit ID [id number]  Activation successful prior to pt leaving office? Yes

## 2024-07-22 ENCOUNTER — TELEPHONE (OUTPATIENT)
Dept: CARDIOLOGY CLINIC | Age: 74
End: 2024-07-22

## 2024-07-22 ENCOUNTER — TELEPHONE (OUTPATIENT)
Dept: FAMILY MEDICINE CLINIC | Age: 74
End: 2024-07-22

## 2024-07-22 DIAGNOSIS — I25.10 CORONARY ARTERY DISEASE INVOLVING NATIVE CORONARY ARTERY OF NATIVE HEART WITHOUT ANGINA PECTORIS: ICD-10-CM

## 2024-07-22 RX ORDER — CARVEDILOL 6.25 MG/1
6.25 TABLET ORAL 2 TIMES DAILY WITH MEALS
Qty: 180 TABLET | Refills: 3 | Status: SHIPPED | OUTPATIENT
Start: 2024-07-22

## 2024-07-22 NOTE — TELEPHONE ENCOUNTER
Spoke with patient, VU of results and to increase coreg to 6.25 BID, pharmacy verified and refill sent.

## 2024-07-22 NOTE — TELEPHONE ENCOUNTER
----- Message from Noel Garza DO sent at 7/19/2024  6:16 PM EDT -----  EKG shows sinus rhythm with frequent PVCs.  Let's please have him increase his Coreg to 6.25 mg twice daily.

## 2024-07-22 NOTE — TELEPHONE ENCOUNTER
ED Follow Up Call/ Schedule appt   ED: Laura Denis  Reason: Palpitations  Date:7/18/24     Appt scheduled: N/A         Comments: spoke with patient he stated that he dont need anything from PCP              Future Appointments   Date Time Provider Department Center   8/21/2024 11:30 AM Chau Prieto DO [facility]   12/18/2024 11:15 AM Noel Garza DO [facility]

## 2024-07-29 ENCOUNTER — TELEPHONE (OUTPATIENT)
Dept: CARDIOLOGY CLINIC | Age: 74
End: 2024-07-29

## 2024-07-29 NOTE — TELEPHONE ENCOUNTER
PT contacted office requesting return call from nurse, pt wants to update on how he has been doing this past week. Pt states he is scheduled for stress test end of aug and echo in September.

## 2024-07-29 NOTE — TELEPHONE ENCOUNTER
Called and spoke with pt, pt states he is having palps, coreg was increased to 6.25 bid on 7/22 for pvcs. Pt states he is super fatigued, unable to do much.   Pt has stress scheduled 8/15 and echo on 9/17/24. Pt wants to know if these dates are ok per [facility].     [facility] please advise

## 2024-07-30 NOTE — TELEPHONE ENCOUNTER
JUHI for patient, Spoke with Central Scheduling, was able to get patients appointments moved up to August 6th at 730 for Stress test and 8 am for Echocardiogram at [facility].    Please remind patient to be NPO the night before, no caffeine for 24 hours prior, okay to take meds with a sip of water in the morning prior to test.

## 2024-08-06 ENCOUNTER — HOSPITAL ENCOUNTER (OUTPATIENT)
Age: 74
Discharge: HOME | End: 2024-08-08
Attending: INTERNAL MEDICINE
Payer: MEDICARE

## 2024-08-06 ENCOUNTER — HOSPITAL ENCOUNTER (OUTPATIENT)
Dept: NUCLEAR MEDICINE | Age: 74
Discharge: HOME | End: 2024-08-06
Attending: INTERNAL MEDICINE
Payer: MEDICARE

## 2024-08-06 VITALS
SYSTOLIC BLOOD PRESSURE: 138 MMHG | DIASTOLIC BLOOD PRESSURE: 87 MMHG | HEIGHT: 74 IN | BODY MASS INDEX: 35.94 KG/M2 | WEIGHT: 280 LBS

## 2024-08-06 DIAGNOSIS — R94.31 ABNORMAL ELECTROCARDIOGRAPHY: ICD-10-CM

## 2024-08-06 DIAGNOSIS — I25.10 CORONARY ARTERY DISEASE INVOLVING NATIVE HEART WITHOUT ANGINA PECTORIS, UNSPECIFIED VESSEL OR LESION TYPE: ICD-10-CM

## 2024-08-06 DIAGNOSIS — R53.83 OTHER FATIGUE: ICD-10-CM

## 2024-08-06 DIAGNOSIS — R00.2 PALPITATION: ICD-10-CM

## 2024-08-06 LAB
ECHO AO ROOT DIAM: 3.5 CM
ECHO AO ROOT INDEX: 1.39 CM/M2
ECHO AV CUSP MM: 2.3 CM
ECHO AV PEAK GRADIENT: 8 MMHG
ECHO AV PEAK VELOCITY: 1.4 M/S
ECHO BSA: 2.57 M2
ECHO BSA: 2.57 M2
ECHO EST RA PRESSURE: 3 MMHG
ECHO LA AREA 2C: 21.7 CM2
ECHO LA AREA 4C: 22.7 CM2
ECHO LA DIAMETER INDEX: 1.71 CM/M2
ECHO LA DIAMETER: 4.3 CM
ECHO LA MAJOR AXIS: 6.7 CM
ECHO LA MINOR AXIS: 5.9 CM
ECHO LA TO AORTIC ROOT RATIO: 1.23
ECHO LA VOL BP: 65 ML (ref 18–58)
ECHO LA VOL MOD A2C: 64 ML (ref 18–58)
ECHO LA VOL MOD A4C: 59 ML (ref 18–58)
ECHO LA VOL/BSA BIPLANE: 26 ML/M2 (ref 16–34)
ECHO LA VOLUME INDEX MOD A2C: 25 ML/M2 (ref 16–34)
ECHO LA VOLUME INDEX MOD A4C: 24 ML/M2 (ref 16–34)
ECHO LV E' LATERAL VELOCITY: 12 CM/S
ECHO LV E' SEPTAL VELOCITY: 9 CM/S
ECHO LV FRACTIONAL SHORTENING: 40 % (ref 28–44)
ECHO LV INTERNAL DIMENSION DIASTOLE INDEX: 2.07 CM/M2
ECHO LV INTERNAL DIMENSION DIASTOLIC: 5.2 CM (ref 4.2–5.9)
ECHO LV INTERNAL DIMENSION SYSTOLIC INDEX: 1.24 CM/M2
ECHO LV INTERNAL DIMENSION SYSTOLIC: 3.1 CM
ECHO LV ISOVOLUMETRIC RELAXATION TIME (IVRT): 82 MS
ECHO LV IVSD: 1.2 CM (ref 0.6–1)
ECHO LV MASS 2D: 248.8 G (ref 88–224)
ECHO LV MASS INDEX 2D: 99.1 G/M2 (ref 49–115)
ECHO LV POSTERIOR WALL DIASTOLIC: 1.2 CM (ref 0.6–1)
ECHO LV RELATIVE WALL THICKNESS RATIO: 0.46
ECHO MV A VELOCITY: 1.08 M/S
ECHO MV E VELOCITY: 0.81 M/S
ECHO MV E/A RATIO: 0.75
ECHO MV E/E' LATERAL: 6.75
ECHO MV E/E' RATIO (AVERAGED): 7.88
ECHO MV E/E' SEPTAL: 9
ECHO PV MAX VELOCITY: 0.9 M/S
ECHO PV PEAK GRADIENT: 3 MMHG
ECHO RA AREA 4C: 17.9 CM2
ECHO RA END SYSTOLIC VOLUME APICAL 4 CHAMBER INDEX BSA: 20 ML/M2
ECHO RA VOLUME: 49 ML
ECHO RIGHT VENTRICULAR SYSTOLIC PRESSURE (RVSP): 32 MMHG
ECHO RV BASAL DIMENSION: 3.7 CM
ECHO RV FREE WALL PEAK S': 17 CM/S
ECHO RV LONGITUDINAL DIMENSION: 8.5 CM
ECHO RV MID DIMENSION: 3.2 CM
ECHO RV TAPSE: 2.3 CM (ref 1.7–?)
ECHO TV PEAK GRADIENT: 2 MMHG
ECHO TV REGURGITANT MAX VELOCITY: 2.68 M/S
ECHO TV REGURGITANT PEAK GRADIENT: 29 MMHG
NUC STRESS EJECTION FRACTION: 56 %
NUC STRESS LV EDV: 151 ML (ref 67–155)
NUC STRESS LV ESV: 67 ML (ref 22–58)
NUC STRESS LV MASS: 167 G
STRESS BASELINE DIAS BP: 65 MMHG
STRESS BASELINE HR: 60 BPM
STRESS BASELINE SYS BP: 110 MMHG
STRESS ESTIMATED WORKLOAD: 1 METS
STRESS PEAK DIAS BP: 74 MMHG
STRESS PEAK SYS BP: 148 MMHG
STRESS PERCENT HR ACHIEVED: 63 %
STRESS POST PEAK HR: 93 BPM
STRESS RATE PRESSURE PRODUCT: ABNORMAL BPM*MMHG
STRESS TARGET HR: 148 BPM

## 2024-08-06 PROCEDURE — 93306 TTE W/DOPPLER COMPLETE: CPT

## 2024-08-06 PROCEDURE — A9502 TC99M TETROFOSMIN: HCPCS | Performed by: INTERNAL MEDICINE

## 2024-08-06 PROCEDURE — 6360000002 HC RX W HCPCS: Performed by: INTERNAL MEDICINE

## 2024-08-06 PROCEDURE — 93016 CV STRESS TEST SUPVJ ONLY: CPT | Performed by: INTERNAL MEDICINE

## 2024-08-06 PROCEDURE — 93017 CV STRESS TEST TRACING ONLY: CPT

## 2024-08-06 PROCEDURE — 93018 CV STRESS TEST I&R ONLY: CPT | Performed by: INTERNAL MEDICINE

## 2024-08-06 PROCEDURE — 93306 TTE W/DOPPLER COMPLETE: CPT | Performed by: INTERNAL MEDICINE

## 2024-08-06 PROCEDURE — 78452 HT MUSCLE IMAGE SPECT MULT: CPT

## 2024-08-06 PROCEDURE — 78452 HT MUSCLE IMAGE SPECT MULT: CPT | Performed by: INTERNAL MEDICINE

## 2024-08-06 PROCEDURE — 3430000000 HC RX DIAGNOSTIC RADIOPHARMACEUTICAL: Performed by: INTERNAL MEDICINE

## 2024-08-06 RX ORDER — REGADENOSON 0.08 MG/ML
0.4 INJECTION, SOLUTION INTRAVENOUS
Status: COMPLETED | OUTPATIENT
Start: 2024-08-06 | End: 2024-08-06

## 2024-08-06 RX ADMIN — REGADENOSON 0.4 MG: 0.08 INJECTION, SOLUTION INTRAVENOUS at 09:10

## 2024-08-06 RX ADMIN — TETROFOSMIN 33 MILLICURIE: 1.38 INJECTION, POWDER, LYOPHILIZED, FOR SOLUTION INTRAVENOUS at 09:10

## 2024-08-06 RX ADMIN — TETROFOSMIN 10.5 MILLICURIE: 1.38 INJECTION, POWDER, LYOPHILIZED, FOR SOLUTION INTRAVENOUS at 07:34

## 2024-08-08 NOTE — RESULT ENCOUNTER NOTE
Patient called back [facility] appt made to discuss proceeding with [facility] given abnormal stress

## 2024-08-08 NOTE — RESULT ENCOUNTER NOTE
Attempted to reach, no answer. Left VM to call office to discuss [facility] results.

## 2024-08-08 NOTE — PROGRESS NOTES
[facility]   Cardiovascular Evaluation    PATIENT: Mj Delgadillo  DATE: 2024  MRN: [id number]  [facility]: [id number]  : 1950      Primary Care Doctor: Chau Prieto DO  Reason for evaluation:   Follow-up for CAD    Subjective:     Mj Delgadillo is a 74 y.o. male with a history of CAD s/p inferior STEMI and PCI to mid-distal RCA with overlapping Xience Zoie 4.0 x 38 mm and 4.0 x 33 mm FADIA (tapered to final diameter of 4.8 mm) on 21, atrial flutter s/p RFCA on 18 (no longer on anticoagulation), paroxysmal atrial tachycardia, PVCs, essential hypertension, hyperlipidemia, polymyalgia rheumatica, and obesity who presents for follow-up.    The patient was admitted to [facility] from -21 with an acute inferior STEMI for which he underwent PCI to the mid-distal RCA with overlapping Xience Zoie 4.0 x 38 mm and 4.0 x 33 mm FADIA, tapered to final diameter of 4.8 mm.  Angiography at that time was also notable for a 40% eccentric proximal LAD stenosis and 30% proximal LCx stenosis.  TTE obtained during his admission showed an LVEF of 50% with no wall motion abnormalities, grade 1 diastolic dysfunction, normal RV size and systolic function, and mild mitral and tricuspid regurgitation.    The patient was recently seen in the emergency room on 24 with palpitations that he describes as flutters and fatigue.  His EKG showed sinus rhythm with frequent PVCs and high-sensitivity troponin was within normal limits x2.  TSH and pro-BMP were within normal limits as well.  His Coreg was increased to 6.25 mg BID.  A pharmacologic nuclear SPECT stress test was obtained on 2024 and showed small areas of anteroseptal and apical lateral ischemia.  A TTE performed at that time showed an LVEF of 55-60% with normal wall motion, mild LVH, grade 1 diastolic dysfunction, normal RV size and systolic function, trace aortic regurgitation, mild mitral regurgitation, trace

## 2024-08-12 ENCOUNTER — OFFICE VISIT (OUTPATIENT)
Dept: CARDIOLOGY CLINIC | Age: 74
End: 2024-08-12

## 2024-08-12 ENCOUNTER — TELEPHONE (OUTPATIENT)
Dept: CARDIOLOGY CLINIC | Age: 74
End: 2024-08-12

## 2024-08-12 VITALS
DIASTOLIC BLOOD PRESSURE: 80 MMHG | OXYGEN SATURATION: 97 % | WEIGHT: 272 LBS | HEART RATE: 74 BPM | BODY MASS INDEX: 34.91 KG/M2 | HEIGHT: 74 IN | SYSTOLIC BLOOD PRESSURE: 134 MMHG

## 2024-08-12 DIAGNOSIS — R53.83 FATIGUE, UNSPECIFIED TYPE: ICD-10-CM

## 2024-08-12 DIAGNOSIS — I49.3 PVC (PREMATURE VENTRICULAR CONTRACTION): ICD-10-CM

## 2024-08-12 DIAGNOSIS — I10 ESSENTIAL HYPERTENSION: Primary | ICD-10-CM

## 2024-08-12 DIAGNOSIS — I48.92 ATRIAL FLUTTER, UNSPECIFIED TYPE (HCC): ICD-10-CM

## 2024-08-12 DIAGNOSIS — I25.10 CORONARY ARTERY DISEASE INVOLVING NATIVE CORONARY ARTERY OF NATIVE HEART, UNSPECIFIED WHETHER ANGINA PRESENT: ICD-10-CM

## 2024-08-12 DIAGNOSIS — R00.2 PALPITATIONS: ICD-10-CM

## 2024-08-12 DIAGNOSIS — E78.5 HYPERLIPIDEMIA, UNSPECIFIED HYPERLIPIDEMIA TYPE: ICD-10-CM

## 2024-08-12 RX ORDER — CARVEDILOL 12.5 MG/1
12.5 TABLET ORAL 2 TIMES DAILY WITH MEALS
Qty: 180 TABLET | Refills: 3 | Status: SHIPPED | OUTPATIENT
Start: 2024-08-12

## 2024-08-12 RX ORDER — CARVEDILOL 12.5 MG/1
12.5 TABLET ORAL 2 TIMES DAILY WITH MEALS
Qty: 90 TABLET | Refills: 3
Start: 2024-08-12 | End: 2024-08-12

## 2024-08-12 NOTE — PATIENT INSTRUCTIONS
Continue aspirin 81 mg daily, rosuvastatin 30 mg daily, and lisinopril-HCTZ 20-25 mg daily.  Check lipid panel.  Increase coreg to 12.5 mg twice a day.  I recommend you undergo invasive coronary angiography. Janeth our  will reach out to you with date/time of procedure.  Follow-up with me after procedure.

## 2024-08-12 NOTE — TELEPHONE ENCOUNTER
Cardiac Cath orders: be specific (Left)  Ordering Provider: Dr. Garza  Performing Provider:   Length of time for procedure:  Anesthesia: (N)  Reps needed:  Specific equip needed: (NA)  Medications to hold: Lisinopril-hydorchlorothiazide, vitamins, minerals and over the counter medications the morning of the test.  Pt aware of meds to hold?: (N)  Urgent? (Need time frame): This Thursday or Friday 8/15 or 8/16 per [facility].

## 2024-08-13 PROBLEM — I25.10 CAD (CORONARY ARTERY DISEASE): Status: ACTIVE | Noted: 2024-08-12

## 2024-08-13 NOTE — TELEPHONE ENCOUNTER
Procedure:  [facility]  Doctor:  Dr. Garza  Date:  8/16/24  Time:  9am  Arrival:  7:30am  Reps:  n/a  Anesthesia:  n/a      Spoke with patient. Please have patient arrive to the main entrance of [facility] ([address] [place] [id number]) and check in with the registration desk.  They will be directed to the Cath Lab. Remind patient to be NPO after midnight (8 hours prior). Do not apply lotions/creams on skin the day of procedure.

## 2024-08-15 ENCOUNTER — TELEPHONE (OUTPATIENT)
Dept: CARDIOLOGY CLINIC | Age: 74
End: 2024-08-15

## 2024-08-15 NOTE — TELEPHONE ENCOUNTER
PT VU of pre op instructions, aware to be NPO after midnight, and to arrive at [facility] at 730 am.    He is aware to hold Lisinopril-hydorchlorothiazide, vitamins, minerals and over the counter medications the morning of the test.     Signed and held orders placed in epic.

## 2024-08-16 ENCOUNTER — HOSPITAL ENCOUNTER (OUTPATIENT)
Age: 74
Setting detail: OBSERVATION
Discharge: HOME | End: 2024-08-17
Attending: INTERNAL MEDICINE | Admitting: INTERNAL MEDICINE
Payer: MEDICARE

## 2024-08-16 DIAGNOSIS — I25.10 CAD (CORONARY ARTERY DISEASE): ICD-10-CM

## 2024-08-16 PROBLEM — R94.39 ABNORMAL STRESS TEST: Status: ACTIVE | Noted: 2024-08-16

## 2024-08-16 PROBLEM — Z98.890 STATUS POST CARDIAC CATHETERIZATION: Status: ACTIVE | Noted: 2024-08-16

## 2024-08-16 LAB
ANION GAP SERPL CALCULATED.3IONS-SCNC: 10 MMOL/L (ref 3–16)
BUN SERPL-MCNC: 25 MG/DL (ref 7–20)
CALCIUM SERPL-MCNC: 9.4 MG/DL (ref 8.3–10.6)
CHLORIDE SERPL-SCNC: 103 MMOL/L (ref 99–110)
CHOLEST SERPL-MCNC: 114 MG/DL (ref 0–199)
CO2 SERPL-SCNC: 29 MMOL/L (ref 21–32)
CREAT SERPL-MCNC: 0.7 MG/DL (ref 0.8–1.3)
DEPRECATED RDW RBC AUTO: 14.1 % (ref 12.4–15.4)
ECHO BSA: 2.54 M2
EKG ATRIAL RATE: 52 BPM
EKG ATRIAL RATE: 61 BPM
EKG DIAGNOSIS: NORMAL
EKG DIAGNOSIS: NORMAL
EKG P AXIS: 33 DEGREES
EKG P AXIS: 44 DEGREES
EKG P-R INTERVAL: 170 MS
EKG P-R INTERVAL: 172 MS
EKG Q-T INTERVAL: 420 MS
EKG Q-T INTERVAL: 448 MS
EKG QRS DURATION: 96 MS
EKG QRS DURATION: 98 MS
EKG QTC CALCULATION (BAZETT): 416 MS
EKG QTC CALCULATION (BAZETT): 422 MS
EKG R AXIS: 12 DEGREES
EKG R AXIS: 6 DEGREES
EKG T AXIS: 24 DEGREES
EKG T AXIS: 32 DEGREES
EKG VENTRICULAR RATE: 52 BPM
EKG VENTRICULAR RATE: 61 BPM
GFR SERPLBLD CREATININE-BSD FMLA CKD-EPI: >90 ML/MIN/{1.73_M2}
GLUCOSE SERPL-MCNC: 140 MG/DL (ref 70–99)
HCT VFR BLD AUTO: 43.4 % (ref 40.5–52.5)
HDLC SERPL-MCNC: 33 MG/DL (ref 40–60)
HGB BLD-MCNC: 14.4 G/DL (ref 13.5–17.5)
LDLC SERPL CALC-MCNC: 58 MG/DL
MCH RBC QN AUTO: 29.9 PG (ref 26–34)
MCHC RBC AUTO-ENTMCNC: 33.1 G/DL (ref 31–36)
MCV RBC AUTO: 90.3 FL (ref 80–100)
PLATELET # BLD AUTO: 162 K/UL (ref 135–450)
PMV BLD AUTO: 7.7 FL (ref 5–10.5)
POC ACT LR: 224 SEC
POC ACT LR: 307 SEC
POC ACT LR: 358 SEC
POC ACT LR: >400 SEC
POTASSIUM SERPL-SCNC: 3.6 MMOL/L (ref 3.5–5.1)
RBC # BLD AUTO: 4.81 M/UL (ref 4.2–5.9)
SODIUM SERPL-SCNC: 142 MMOL/L (ref 136–145)
TRIGL SERPL-MCNC: 113 MG/DL (ref 0–150)
VLDLC SERPL CALC-MCNC: 23 MG/DL
WBC # BLD AUTO: 9 K/UL (ref 4–11)

## 2024-08-16 PROCEDURE — 92928 PRQ TCAT PLMT NTRAC ST 1 LES: CPT | Performed by: INTERNAL MEDICINE

## 2024-08-16 PROCEDURE — 93458 L HRT ARTERY/VENTRICLE ANGIO: CPT | Performed by: INTERNAL MEDICINE

## 2024-08-16 PROCEDURE — C1874 STENT, COATED/COV W/DEL SYS: HCPCS | Performed by: INTERNAL MEDICINE

## 2024-08-16 PROCEDURE — 7100000010 HC PHASE II RECOVERY - FIRST 15 MIN: Performed by: INTERNAL MEDICINE

## 2024-08-16 PROCEDURE — 6370000000 HC RX 637 (ALT 250 FOR IP): Performed by: INTERNAL MEDICINE

## 2024-08-16 PROCEDURE — C1894 INTRO/SHEATH, NON-LASER: HCPCS | Performed by: INTERNAL MEDICINE

## 2024-08-16 PROCEDURE — 2500000003 HC RX 250 WO HCPCS: Performed by: INTERNAL MEDICINE

## 2024-08-16 PROCEDURE — C1769 GUIDE WIRE: HCPCS | Performed by: INTERNAL MEDICINE

## 2024-08-16 PROCEDURE — 2709999900 HC NON-CHARGEABLE SUPPLY: Performed by: INTERNAL MEDICINE

## 2024-08-16 PROCEDURE — 7100000011 HC PHASE II RECOVERY - ADDTL 15 MIN: Performed by: INTERNAL MEDICINE

## 2024-08-16 PROCEDURE — 6360000002 HC RX W HCPCS: Performed by: INTERNAL MEDICINE

## 2024-08-16 PROCEDURE — 80061 LIPID PANEL: CPT

## 2024-08-16 PROCEDURE — 80048 BASIC METABOLIC PNL TOTAL CA: CPT

## 2024-08-16 PROCEDURE — 99153 MOD SED SAME PHYS/QHP EA: CPT | Performed by: INTERNAL MEDICINE

## 2024-08-16 PROCEDURE — 6360000004 HC RX CONTRAST MEDICATION: Performed by: INTERNAL MEDICINE

## 2024-08-16 PROCEDURE — 85347 COAGULATION TIME ACTIVATED: CPT

## 2024-08-16 PROCEDURE — C1887 CATHETER, GUIDING: HCPCS | Performed by: INTERNAL MEDICINE

## 2024-08-16 PROCEDURE — 2580000003 HC RX 258: Performed by: INTERNAL MEDICINE

## 2024-08-16 PROCEDURE — 93571 IV DOP VEL&/PRESS C FLO 1ST: CPT | Performed by: INTERNAL MEDICINE

## 2024-08-16 PROCEDURE — 85027 COMPLETE CBC AUTOMATED: CPT

## 2024-08-16 PROCEDURE — C1725 CATH, TRANSLUMIN NON-LASER: HCPCS | Performed by: INTERNAL MEDICINE

## 2024-08-16 PROCEDURE — G0378 HOSPITAL OBSERVATION PER HR: HCPCS

## 2024-08-16 PROCEDURE — C1753 CATH, INTRAVAS ULTRASOUND: HCPCS | Performed by: INTERNAL MEDICINE

## 2024-08-16 PROCEDURE — 92978 ENDOLUMINL IVUS OCT C 1ST: CPT | Performed by: INTERNAL MEDICINE

## 2024-08-16 PROCEDURE — 93010 ELECTROCARDIOGRAM REPORT: CPT | Performed by: INTERNAL MEDICINE

## 2024-08-16 PROCEDURE — 93005 ELECTROCARDIOGRAM TRACING: CPT | Performed by: INTERNAL MEDICINE

## 2024-08-16 PROCEDURE — 99152 MOD SED SAME PHYS/QHP 5/>YRS: CPT | Performed by: INTERNAL MEDICINE

## 2024-08-16 DEVICE — XIENCE SIERRA™ EVEROLIMUS ELUTING CORONARY STENT SYSTEM 4.00 MM X 28 MM / RAPID-EXCHANGE
Type: IMPLANTABLE DEVICE | Status: FUNCTIONAL
Brand: XIENCE SIERRA™

## 2024-08-16 RX ORDER — MIDAZOLAM HYDROCHLORIDE 1 MG/ML
INJECTION INTRAMUSCULAR; INTRAVENOUS PRN
Status: DISCONTINUED | OUTPATIENT
Start: 2024-08-16 | End: 2024-08-16 | Stop reason: HOSPADM

## 2024-08-16 RX ORDER — VERAPAMIL HYDROCHLORIDE 2.5 MG/ML
INJECTION, SOLUTION INTRAVENOUS PRN
Status: DISCONTINUED | OUTPATIENT
Start: 2024-08-16 | End: 2024-08-16 | Stop reason: HOSPADM

## 2024-08-16 RX ORDER — ASPIRIN 81 MG/1
243 TABLET, CHEWABLE ORAL ONCE
Status: COMPLETED | OUTPATIENT
Start: 2024-08-16 | End: 2024-08-16

## 2024-08-16 RX ORDER — LISINOPRIL AND HYDROCHLOROTHIAZIDE 25; 20 MG/1; MG/1
1 TABLET ORAL DAILY
Status: DISCONTINUED | OUTPATIENT
Start: 2024-08-17 | End: 2024-08-16 | Stop reason: CLARIF

## 2024-08-16 RX ORDER — FAMOTIDINE 10 MG/ML
20 INJECTION, SOLUTION INTRAVENOUS ONCE
Status: COMPLETED | OUTPATIENT
Start: 2024-08-16 | End: 2024-08-16

## 2024-08-16 RX ORDER — ADENOSINE 3 MG/ML
INJECTION, SOLUTION INTRAVENOUS CONTINUOUS PRN
Status: DISCONTINUED | OUTPATIENT
Start: 2024-08-16 | End: 2024-08-16 | Stop reason: HOSPADM

## 2024-08-16 RX ORDER — PREDNISONE 1 MG/1
2 TABLET ORAL DAILY
Status: DISCONTINUED | OUTPATIENT
Start: 2024-08-17 | End: 2024-08-17 | Stop reason: HOSPADM

## 2024-08-16 RX ORDER — CLOPIDOGREL BISULFATE 75 MG/1
75 TABLET ORAL DAILY
Status: DISCONTINUED | OUTPATIENT
Start: 2024-08-17 | End: 2024-08-17 | Stop reason: HOSPADM

## 2024-08-16 RX ORDER — SODIUM CHLORIDE 0.9 % (FLUSH) 0.9 %
5-40 SYRINGE (ML) INJECTION EVERY 12 HOURS SCHEDULED
Status: DISCONTINUED | OUTPATIENT
Start: 2024-08-16 | End: 2024-08-17 | Stop reason: HOSPADM

## 2024-08-16 RX ORDER — CARVEDILOL 6.25 MG/1
12.5 TABLET ORAL 2 TIMES DAILY WITH MEALS
Status: DISCONTINUED | OUTPATIENT
Start: 2024-08-16 | End: 2024-08-17 | Stop reason: HOSPADM

## 2024-08-16 RX ORDER — FENTANYL CITRATE 50 UG/ML
INJECTION, SOLUTION INTRAMUSCULAR; INTRAVENOUS PRN
Status: DISCONTINUED | OUTPATIENT
Start: 2024-08-16 | End: 2024-08-16 | Stop reason: HOSPADM

## 2024-08-16 RX ORDER — CLOPIDOGREL 300 MG/1
TABLET, FILM COATED ORAL PRN
Status: DISCONTINUED | OUTPATIENT
Start: 2024-08-16 | End: 2024-08-16 | Stop reason: HOSPADM

## 2024-08-16 RX ORDER — SODIUM CHLORIDE 9 MG/ML
INJECTION, SOLUTION INTRAVENOUS PRN
Status: DISCONTINUED | OUTPATIENT
Start: 2024-08-16 | End: 2024-08-16 | Stop reason: HOSPADM

## 2024-08-16 RX ORDER — SODIUM CHLORIDE 9 MG/ML
INJECTION, SOLUTION INTRAVENOUS PRN
Status: DISCONTINUED | OUTPATIENT
Start: 2024-08-16 | End: 2024-08-17 | Stop reason: HOSPADM

## 2024-08-16 RX ORDER — NITROGLYCERIN 20 MG/100ML
INJECTION INTRAVENOUS CONTINUOUS PRN
Status: COMPLETED | OUTPATIENT
Start: 2024-08-16 | End: 2024-08-16

## 2024-08-16 RX ORDER — ROSUVASTATIN CALCIUM 10 MG/1
40 TABLET, COATED ORAL DAILY
Status: DISCONTINUED | OUTPATIENT
Start: 2024-08-17 | End: 2024-08-17 | Stop reason: HOSPADM

## 2024-08-16 RX ORDER — HYDROCHLOROTHIAZIDE 25 MG/1
25 TABLET ORAL DAILY
Status: DISCONTINUED | OUTPATIENT
Start: 2024-08-17 | End: 2024-08-17 | Stop reason: HOSPADM

## 2024-08-16 RX ORDER — SODIUM CHLORIDE 0.9 % (FLUSH) 0.9 %
5-40 SYRINGE (ML) INJECTION PRN
Status: DISCONTINUED | OUTPATIENT
Start: 2024-08-16 | End: 2024-08-16 | Stop reason: HOSPADM

## 2024-08-16 RX ORDER — HEPARIN SODIUM 1000 [USP'U]/ML
INJECTION, SOLUTION INTRAVENOUS; SUBCUTANEOUS PRN
Status: DISCONTINUED | OUTPATIENT
Start: 2024-08-16 | End: 2024-08-16 | Stop reason: HOSPADM

## 2024-08-16 RX ORDER — ASPIRIN 81 MG/1
81 TABLET, CHEWABLE ORAL DAILY
Status: DISCONTINUED | OUTPATIENT
Start: 2024-08-17 | End: 2024-08-17 | Stop reason: HOSPADM

## 2024-08-16 RX ORDER — ONDANSETRON 2 MG/ML
4 INJECTION INTRAMUSCULAR; INTRAVENOUS EVERY 6 HOURS PRN
Status: DISCONTINUED | OUTPATIENT
Start: 2024-08-16 | End: 2024-08-16 | Stop reason: HOSPADM

## 2024-08-16 RX ORDER — SODIUM CHLORIDE 0.9 % (FLUSH) 0.9 %
5-40 SYRINGE (ML) INJECTION EVERY 12 HOURS SCHEDULED
Status: DISCONTINUED | OUTPATIENT
Start: 2024-08-16 | End: 2024-08-16 | Stop reason: HOSPADM

## 2024-08-16 RX ORDER — LORAZEPAM 0.5 MG/1
0.5 TABLET ORAL
Status: DISCONTINUED | OUTPATIENT
Start: 2024-08-16 | End: 2024-08-16 | Stop reason: HOSPADM

## 2024-08-16 RX ORDER — LISINOPRIL 20 MG/1
20 TABLET ORAL DAILY
Status: DISCONTINUED | OUTPATIENT
Start: 2024-08-17 | End: 2024-08-17 | Stop reason: HOSPADM

## 2024-08-16 RX ORDER — ACETAMINOPHEN 325 MG/1
650 TABLET ORAL EVERY 4 HOURS PRN
Status: DISCONTINUED | OUTPATIENT
Start: 2024-08-16 | End: 2024-08-17 | Stop reason: HOSPADM

## 2024-08-16 RX ORDER — SODIUM CHLORIDE 0.9 % (FLUSH) 0.9 %
5-40 SYRINGE (ML) INJECTION PRN
Status: DISCONTINUED | OUTPATIENT
Start: 2024-08-16 | End: 2024-08-17 | Stop reason: HOSPADM

## 2024-08-16 RX ADMIN — ACETAMINOPHEN 650 MG: 325 TABLET ORAL at 20:06

## 2024-08-16 RX ADMIN — ASPIRIN 81 MG 243 MG: 81 TABLET ORAL at 08:55

## 2024-08-16 RX ADMIN — ACETAMINOPHEN 650 MG: 325 TABLET ORAL at 12:31

## 2024-08-16 RX ADMIN — CARVEDILOL 12.5 MG: 6.25 TABLET, FILM COATED ORAL at 18:39

## 2024-08-16 RX ADMIN — Medication 10 ML: at 20:06

## 2024-08-16 RX ADMIN — FAMOTIDINE 20 MG: 10 INJECTION, SOLUTION INTRAVENOUS at 12:40

## 2024-08-16 ASSESSMENT — PAIN SCALES - GENERAL
PAINLEVEL_OUTOF10: 5
PAINLEVEL_OUTOF10: 4
PAINLEVEL_OUTOF10: 5

## 2024-08-16 ASSESSMENT — PAIN DESCRIPTION - LOCATION: LOCATION: CHEST;HEAD

## 2024-08-16 ASSESSMENT — LIFESTYLE VARIABLES
HOW OFTEN DO YOU HAVE A DRINK CONTAINING ALCOHOL: NEVER
HOW MANY STANDARD DRINKS CONTAINING ALCOHOL DO YOU HAVE ON A TYPICAL DAY: 3 OR 4

## 2024-08-16 NOTE — H&P
Brief Pre-Op Note/Sedation Assessment      Mj Delgadillo  1950  [id number]  4:56 PM    Planned Procedure: Cardiac Catheterization Procedure  Post Procedure Plan: Return to same level of care  Consent: I have discussed with the patient and/or the patient representative the indication, alternatives, and the possible risks and/or complications of the planned procedure and the anesthesia methods. The patient and/or patient representative appear to understand and agree to proceed.      Chief Complaint:   Fatigue, abnormal nuclear SPECT stress test, CAD    Indications for Cath Procedure:  Presentation:  Fatigue, abnormal nuclear SPECT stress test, CAD  2.  Anginal Classification within 2 weeks:  Fatigue as possible anginal equivalent  3.  Angina Symptoms Assessment:  Fatigue as possible anginal equivalent  4.  Heart Failure Class within last 2 weeks:  No symptoms  5.  Cardiovascular Instability:  No    Prior Ischemic Workup/Eval:  Pre-Procedural Medications: Yes: ACE/ARB/ARNI, Aspirin, Beta Blockers, and STATIN  2.   Stress Test Completed?  Yes:  Stress or Imaging Studies Performed (within ANY time period):   Type:  Stress Nuclear  Results:  Positive:  Myocardial Perfusion Defects (Nuclear) Extent of Ischemia:  Intermediate    Does Patient need surgery?  Cath Valve Surgery:  No    Pre-Procedure Medical History:  Vital Signs:  /75   Pulse 52   Temp 97.4 °F (36.3 °C) (Oral)   Resp 19   Ht 1.88 m (6' 2\")   Wt 123.4 kg (272 lb)   SpO2 96%   BMI 34.92 kg/m²     Allergies:  No Known Allergies  Medications:    Current Facility-Administered Medications   Medication Dose Route Frequency Provider Last Rate Last Admin    sodium chloride flush 0.9 % injection 5-40 mL  5-40 mL IntraVENous 2 times per day Haddox, Noel, DO        sodium chloride flush 0.9 % injection 5-40 mL  5-40 mL IntraVENous PRN Haddox, Noel, DO        0.9 % sodium chloride infusion   IntraVENous PRN Haddox, Noel, DO        acetaminophen

## 2024-08-16 NOTE — PROGRESS NOTES
Report given to patients nurse Kayce CONNER. Pt transferred to room 219. [facility] called and monitor is on and verified.

## 2024-08-16 NOTE — PROCEDURES
CARDIAC CATHETERIZATION REPORT    Date of Procedure: 8/16/2024  : Noel Garza DO  Primary Indication: Fatigue, abnormal nuclear SPECT stress test, CAD with previous PCI to RCA    Procedures Performed:  1. Coronary angiography  2. Left heart catheterization  3. DFR/FFR of LAD  4. IVUS of LAD  5. Cutting balloon angioplasty of proximal/mid-LAD  6. PCI of proximal/mid-LAD with FADIA  7. Moderate conscious sedation    Procedural Details:  Access: Local anesthetic was given and access was obtained in the right radial artery using a micropuncture technique and a 6F Terumo Slender Sheath was placed without difficulty.     Diagnostic: A 5F TIG catheter was used to perform selective right and left coronary angiography.  The 5F TIG catheter was also used to perform the left heart catheterization.  No significant gradient was observed on pull-back of the catheter across the aortic valve.    DFR/FFR of LAD: Therapeutic ACT was achieved with the administration of IV heparin.  Using a 6F XB3.0 guide catheter, a 0.014 Prowater wire was advanced into the distal LAD.  A COMET II pressure wire was then equalized and then advanced across the proximal/mid and sequential mid-LAD stenoses and into the distal vessel.  Baseline Pd/Pa was 0.93.  DFR was 0.91.  After achieving maximal hyperemia with IV adenosine at 140 mcg/kg/min, the FFR was 0.75.  On wire pull-back, the majority of obstruction to flow appeared to be attributable to the proximal/mid-LAD lesion.  Intervention (PCI of proximal/mid-LAD): Therapeutic ACT was maintained with the administration of IV heparin.  With the 6F XB3.0 guide catheter in place and the 0.014 Prowater wire positioned in the distal LAD, a 6F Opticross IVUS catheter was advanced over the wire, but initially could not be advanced across the lesion.  The lesion was pre-dilated with a non-compliant 3.5 x 15 mm balloon.  Following this, pull-back IVUS was able to be performed and demonstrated two-three  quadrant eccentric calcification in the proximal/mid-LAD with a reference vessel diameter of ~4 mm.  The lesion was then dilated again with a 4.0 x 15 mm [place] cutting balloon.  The proximal/mid-LAD was next stented with an Xience Zoie 4.0 x 28 mm FADIA.  All but the approximately 8 distal mm of the stent were post-dilated with a non-compliant 4.0 x 20 mm balloon at high pressure.  Final angiography showed <10% residual stenosis in the stented segment with ARLYN 3 flow and no evidence of dissection.  Hemostasis: At the end of the procedure, the radial sheath was removed and a hemoband was placed over the arteriotomy site and filled with air to maintain hemostasis.    Findings:  Hemodynamics:     A. Opening arterial pressure: 112/58 (74) mmHg      B. LVEDP: 20 mmHg    2.  Coronary anatomy:  A. Left main artery: The left main artery bifurcates into the left anterior descending artery and left circumflex artery.  The left main artery has no angiographically significant disease.  B. Left anterior descending artery: Transapical vessel which gives rise to multiple small diagonal arteries.  The LAD has a calcified 60-70% proximal stenosis and another 40% stenosis beyond this in the mid-vessel.  C. Ramus intermedius artery: Medium-large caliber vessel with minor luminal irregularities.  D. Left circumflex artery: Non-dominant vessel that gives rise to one obtuse marginal artery. The LCx has a 20-30% mid-vessel stenosis.  The obtuse marginal artery is a medium caliber vessel with minor luminal irregularities.  E. Right coronary artery: Dominant vessel.  The RCA has a 30-40% proximal to mid-vessel stenosis. Beyond this, there is a long, patent stent extending from the mid to distal vessel.  The RPDA has minor luminal irregularities.  The RPLB has minor luminal irregularities.     DFR/FFR of proximal/mid and sequential mid-LAD stenoses:  Baseline Pd/Pa - 0.93  DFR - 0.91  Hyperemic FFR - 0.75  Majority of obstruction to

## 2024-08-16 NOTE — DISCHARGE INSTRUCTIONS
Cath Labs at  [facility]   Discharge Instructions        8/16/2024  Mj Delgadillo   Date of Birth 1950       Activity:  No driving for 24 hours.  In 24 hours you may remove dressing and shower, wash site gently with soap and water and leave open to air  Avoid submerging your arm in sitting water for 5 days.  Do not use your right hand for 24 hours, then  No lifting more than 5 pounds for 5 days.   No lotions, powders, or ointments near site for 5 days.   No work/school for 5 days unless instructed otherwise by your cardiologist.    Diet:   Resume previous diet, if a cardiac diet is specified you will receive a handout with  general guidelines.   Drink extra non-alcoholic/decaffienated fluids for first 24 hours after your procedure.    Arm Management:  If bleeding occurs from the site or a hematoma (lump) begins to increase in size, apply pressure directly over the site, call 911 to return to the hospital.    Special Instructions:  Report any coolness or numbness in the arm  Report any chills, fever, itching, red bumps or rash   Report any of the following to the MD: drainage from the site, redness and/or swelling at the site, increased tenderness at the site   If you are currently taking Metformin or Metformin combination medications for Diabetes, hold your dose for 48 hours after your procedure.  Consult your Cardiologist before taking any NSAIDS, vitamin supplements, estrogen, or estrogen plus progestin.  Do not stop taking Plavix, Brilinta or Effient, without first consulting your cardiologist.    Sedation Discharge Instructions:  For the next 24 hours do not drive a car, operate machinery, power tools or kitchen appliances.    Do not drink alcohol; including beer or wine.    Do not make any important decisions or sign any important papers.  For the next 24 hours you can expect drowsiness, light-headed or dizziness, nausea/ vomiting, inability to concentrate, fatigue and desire to sleep.  We strongly  feeling in your back, neck or jaw, or upper belly or in one or both shoulders or arms.  Lightheadedness or sudden weakness.  A fast or irregular heartbeat.       After you call 911, the  may tell you to chew 1 adult-strength or 2 to 4                  low-dose aspirin. Wait for an ambulance. Do not try to drive yourself.  Call your doctor today if :  You have any trouble breathing.  Your chest pain gets worse.  You are dizzy or lightheaded, or you feel like you may faint.  You are not getting better as expected.  You are having new or different chest pain.

## 2024-08-17 VITALS
RESPIRATION RATE: 20 BRPM | BODY MASS INDEX: 34.91 KG/M2 | WEIGHT: 272 LBS | OXYGEN SATURATION: 96 % | HEIGHT: 74 IN | SYSTOLIC BLOOD PRESSURE: 129 MMHG | DIASTOLIC BLOOD PRESSURE: 60 MMHG | HEART RATE: 58 BPM | TEMPERATURE: 97.9 F

## 2024-08-17 LAB
ANION GAP SERPL CALCULATED.3IONS-SCNC: 11 MMOL/L (ref 3–16)
BUN SERPL-MCNC: 19 MG/DL (ref 7–20)
CALCIUM SERPL-MCNC: 9 MG/DL (ref 8.3–10.6)
CHLORIDE SERPL-SCNC: 106 MMOL/L (ref 99–110)
CHOLEST SERPL-MCNC: 100 MG/DL (ref 0–199)
CO2 SERPL-SCNC: 26 MMOL/L (ref 21–32)
CREAT SERPL-MCNC: 0.6 MG/DL (ref 0.8–1.3)
DEPRECATED RDW RBC AUTO: 14.1 % (ref 12.4–15.4)
GFR SERPLBLD CREATININE-BSD FMLA CKD-EPI: >90 ML/MIN/{1.73_M2}
GLUCOSE SERPL-MCNC: 92 MG/DL (ref 70–99)
HCT VFR BLD AUTO: 40.1 % (ref 40.5–52.5)
HDLC SERPL-MCNC: 29 MG/DL (ref 40–60)
HGB BLD-MCNC: 13.6 G/DL (ref 13.5–17.5)
LDLC SERPL CALC-MCNC: 47 MG/DL
MCH RBC QN AUTO: 30.7 PG (ref 26–34)
MCHC RBC AUTO-ENTMCNC: 33.9 G/DL (ref 31–36)
MCV RBC AUTO: 90.7 FL (ref 80–100)
PLATELET # BLD AUTO: 158 K/UL (ref 135–450)
PMV BLD AUTO: 8 FL (ref 5–10.5)
POTASSIUM SERPL-SCNC: 3.6 MMOL/L (ref 3.5–5.1)
RBC # BLD AUTO: 4.42 M/UL (ref 4.2–5.9)
SODIUM SERPL-SCNC: 143 MMOL/L (ref 136–145)
TRIGL SERPL-MCNC: 122 MG/DL (ref 0–150)
VLDLC SERPL CALC-MCNC: 24 MG/DL
WBC # BLD AUTO: 7.4 K/UL (ref 4–11)

## 2024-08-17 PROCEDURE — G0378 HOSPITAL OBSERVATION PER HR: HCPCS

## 2024-08-17 PROCEDURE — 99239 HOSP IP/OBS DSCHRG MGMT >30: CPT | Performed by: NURSE PRACTITIONER

## 2024-08-17 PROCEDURE — 36415 COLL VENOUS BLD VENIPUNCTURE: CPT

## 2024-08-17 PROCEDURE — 80061 LIPID PANEL: CPT

## 2024-08-17 PROCEDURE — 93005 ELECTROCARDIOGRAM TRACING: CPT | Performed by: NURSE PRACTITIONER

## 2024-08-17 PROCEDURE — 6370000000 HC RX 637 (ALT 250 FOR IP): Performed by: INTERNAL MEDICINE

## 2024-08-17 PROCEDURE — 80048 BASIC METABOLIC PNL TOTAL CA: CPT

## 2024-08-17 PROCEDURE — 2580000003 HC RX 258: Performed by: INTERNAL MEDICINE

## 2024-08-17 PROCEDURE — 85027 COMPLETE CBC AUTOMATED: CPT

## 2024-08-17 RX ORDER — ROSUVASTATIN CALCIUM 20 MG/1
30 TABLET, COATED ORAL DAILY
Qty: 60 TABLET | Refills: 2 | Status: SHIPPED
Start: 2024-08-17

## 2024-08-17 RX ORDER — PREDNISONE 1 MG/1
2 TABLET ORAL DAILY
Qty: 180 TABLET | Refills: 0 | Status: SHIPPED
Start: 2024-08-18

## 2024-08-17 RX ORDER — CLOPIDOGREL BISULFATE 75 MG/1
75 TABLET ORAL DAILY
Qty: 90 TABLET | Refills: 3 | Status: SHIPPED | OUTPATIENT
Start: 2024-08-18

## 2024-08-17 RX ADMIN — PREDNISONE 2 MG: 1 TABLET ORAL at 08:31

## 2024-08-17 RX ADMIN — CLOPIDOGREL BISULFATE 75 MG: 75 TABLET ORAL at 08:27

## 2024-08-17 RX ADMIN — ASPIRIN 81 MG 81 MG: 81 TABLET ORAL at 08:27

## 2024-08-17 RX ADMIN — LISINOPRIL 20 MG: 20 TABLET ORAL at 08:27

## 2024-08-17 RX ADMIN — CARVEDILOL 12.5 MG: 6.25 TABLET, FILM COATED ORAL at 08:27

## 2024-08-17 RX ADMIN — HYDROCHLOROTHIAZIDE 25 MG: 25 TABLET ORAL at 08:27

## 2024-08-17 RX ADMIN — Medication 10 ML: at 08:27

## 2024-08-17 NOTE — PROGRESS NOTES
Pt d/c'd home.  Removed  IV and stopped bleeding.  Catheter intact. Pt tolerated well. No redness noted at site.  Notified [facility] and removed tele box. Reviewed d/c instructions, home meds, and  f/u information utilizing teach-back method.  Scripts obtained from out patient pharmacy. Patient verbalized understanding. Patient walked to front entrance with all belongings.

## 2024-08-17 NOTE — DISCHARGE SUMMARY
[facility]  Discharge Summary  Patient ID:  Mj Delgadillo  [id number] 74 y.o. 1950    Admit date: 8/16/2024    Discharge date:  8/17/2024    Admitting Physician: Noel Garza DO     Discharge Physician: Nuzhat Farr APRN - CNP     Admission Diagnoses: CAD (coronary artery disease) [I25.10]  Status post cardiac catheterization [Z98.890]    Discharge Diagnoses:   Patient Active Problem List   Diagnosis    Essential hypertension    Carotid stenosis, non-symptomatic    Primary osteoarthritis involving multiple joints    Family history of abdominal aortic aneurysm    Bilateral leg pain    Morbidly obese (HCC)    Leg swelling    Hyperlipidemia    Premature ventricular contraction    STEMI involving right coronary artery (HCC)    STEMI (ST elevation myocardial infarction) (HCC)    Hypokalemia    Fatigue    Shortness of breath    Rectal bleeding    Paroxysmal atrial tachycardia (HCC)    Severe obesity (BMI 35.0-39.9) with comorbidity (HCC)    Polymyalgia rheumatica (HCC)    Coronary artery disease involving native coronary artery of native heart without angina pectoris    CAD (coronary artery disease)    Status post cardiac catheterization    Abnormal stress test      Discharged Condition: good  The patient was seen and examined on day of discharge and this discharge summary is in conjunction with any daily progress note from day of discharge.    Hospital Course: Mj Delgadillo was admitted after abnormal stress test.   Underwent PCI to LAD 8/16/2024.   Overnight, just some chest soreness this am.   He had an episode of discomfort into the right side of the neck overnight improved with drinking soda and belching. No  other radiation of pain. He became anxious overnight.   No dizziness.   No shortness of breath    Assessment:  CAD s/p PCI to LAD with FADIA   - Hx of prior PCI to RCA in setting of STEMI  Freq PVCs   HTN  HLD  Polymyalgia rheumatica- on chronic prednisone    Plan:  Repeat EKG  occasional PVC's    Stress ECG: No significant ST changes noted. Moderate PVC's The stress ECG was not diagnostic due to failure to achieve target heart rate.    [facility] 8/16/24  Procedures Performed:  1. Coronary angiography  2. Left heart catheterization  3. DFR/FFR of LAD  4. IVUS of LAD  5. Cutting balloon angioplasty of proximal/mid-LAD  6. PCI of proximal/mid-LAD with FADIA  7. Moderate conscious sedation     Procedural Details:  Access: Local anesthetic was given and access was obtained in the right radial artery using a micropuncture technique and a 6F Terumo Slender Sheath was placed without difficulty.     Diagnostic: A 5F TIG catheter was used to perform selective right and left coronary angiography.  The 5F TIG catheter was also used to perform the left heart catheterization.  No significant gradient was observed on pull-back of the catheter across the aortic valve.    DFR/FFR of LAD: Therapeutic ACT was achieved with the administration of IV heparin.  Using a 6F XB3.0 guide catheter, a 0.014 Prowater wire was advanced into the distal LAD.  A COMET II pressure wire was then equalized and then advanced across the proximal/mid and sequential mid-LAD stenoses and into the distal vessel.  Baseline Pd/Pa was 0.93.  DFR was 0.91.  After achieving maximal hyperemia with IV adenosine at 140 mcg/kg/min, the FFR was 0.75.  On wire pull-back, the majority of obstruction to flow appeared to be attributable to the proximal/mid-LAD lesion.  Intervention (PCI of proximal/mid-LAD): Therapeutic ACT was maintained with the administration of IV heparin.  With the 6F XB3.0 guide catheter in place and the 0.014 Prowater wire positioned in the distal LAD, a 6F Opticross IVUS catheter was advanced over the wire, but initially could not be advanced across the lesion.  The lesion was pre-dilated with a non-compliant 3.5 x 15 mm balloon.  Following this, pull-back IVUS was able to be performed and demonstrated two-three quadrant

## 2024-08-18 LAB
EKG ATRIAL RATE: 61 BPM
EKG DIAGNOSIS: NORMAL
EKG P AXIS: 36 DEGREES
EKG P-R INTERVAL: 170 MS
EKG Q-T INTERVAL: 428 MS
EKG QRS DURATION: 98 MS
EKG QTC CALCULATION (BAZETT): 430 MS
EKG R AXIS: 3 DEGREES
EKG T AXIS: 21 DEGREES
EKG VENTRICULAR RATE: 61 BPM

## 2024-08-18 PROCEDURE — 93010 ELECTROCARDIOGRAM REPORT: CPT | Performed by: INTERNAL MEDICINE

## 2024-08-19 ENCOUNTER — TELEPHONE (OUTPATIENT)
Dept: FAMILY MEDICINE CLINIC | Age: 74
End: 2024-08-19

## 2024-08-19 ENCOUNTER — TELEPHONE (OUTPATIENT)
Dept: CARDIOLOGY CLINIC | Age: 74
End: 2024-08-19

## 2024-08-19 DIAGNOSIS — I25.10 CORONARY ARTERY DISEASE INVOLVING NATIVE CORONARY ARTERY OF NATIVE HEART, UNSPECIFIED WHETHER ANGINA PRESENT: ICD-10-CM

## 2024-08-19 LAB
ECHO BSA: 2.54 M2
ECHO BSA: 2.57 M2

## 2024-08-19 PROCEDURE — 93228 REMOTE 30 DAY ECG REV/REPORT: CPT | Performed by: INTERNAL MEDICINE

## 2024-08-19 NOTE — TELEPHONE ENCOUNTER
8/19- called pt @[phone]. Pt is now scheduled tomorrow 8/20/2024 9am for an EKG. Pt stated he wll have his grandson bring him as he doesn't feel safe driving b/c of how tired he is. Pt stated he think its may be due to carvediol 12.5mg. Pt stated since the medication dosage increased he's felt very tired with no energy. Pt stated something not right.

## 2024-08-19 NOTE — TELEPHONE ENCOUNTER
Pt had Angio/stent placed 8/16/202. Pt is having fatigue, pressure in chest( after he burps feels better). Dairrhea for day and half. Feeling week. No dizziness. Pt sts his carvedilol (COREG) 12.5 MG tablet was doubled. Pt thinks he should be feeling better by now. Please advise.

## 2024-08-19 NOTE — TELEPHONE ENCOUNTER
Care Transitions Initial Follow Up Call    Outreach made within 2 business days of discharge: Yes    Patient: Mj Delgadillo Patient : 1950   MRN: [id number]  Reason for Admission:  Planned procedure CAD  Discharge Date: 24       Spoke with: Called patient and patient states that he is following up with cardiology at this time and wants to cancel his appointment for .  Patient states that he does not need a follow up appointment with Dr. Prieto at this time.  Patient had a planned procedure with cardiology.    Discharge department/facility: [facility] Interactive Patient Contact:  Was patient able to fill all prescriptions: Yes  Was patient instructed to bring all medications to the follow-up visit: Yes  Is patient taking all medications as directed in the discharge summary? Yes  Does patient understand their discharge instructions: Yes  Does patient have questions or concerns that need addressed prior to 7-14 day follow up office visit: no        Scheduled appointment with PCP within 7-14 days    Follow Up  Future Appointments   Date Time Provider Department Center   2024 11:30 AM Chau Prieto DO [facility]   2024 10:00 AM Reji Caraballo, APRN - CNP [facility]   2024 11:15 AM Noel Garza DO [facility]       Maddi Dominguez LPN

## 2024-08-20 ENCOUNTER — HOSPITAL ENCOUNTER (OUTPATIENT)
Age: 74
Discharge: HOME | End: 2024-08-20
Payer: MEDICARE

## 2024-08-20 ENCOUNTER — LAB (OUTPATIENT)
Dept: CARDIOLOGY CLINIC | Age: 74
End: 2024-08-20
Payer: MEDICARE

## 2024-08-20 ENCOUNTER — TELEPHONE (OUTPATIENT)
Dept: CARDIOLOGY CLINIC | Age: 74
End: 2024-08-20

## 2024-08-20 VITALS — DIASTOLIC BLOOD PRESSURE: 70 MMHG | HEART RATE: 65 BPM | SYSTOLIC BLOOD PRESSURE: 156 MMHG | OXYGEN SATURATION: 96 %

## 2024-08-20 DIAGNOSIS — R07.9 CHEST PAIN, UNSPECIFIED TYPE: Primary | ICD-10-CM

## 2024-08-20 DIAGNOSIS — Z79.899 MEDICATION MANAGEMENT: ICD-10-CM

## 2024-08-20 LAB
ANION GAP SERPL CALCULATED.3IONS-SCNC: 12 MMOL/L (ref 3–16)
BUN SERPL-MCNC: 24 MG/DL (ref 7–20)
CALCIUM SERPL-MCNC: 9.6 MG/DL (ref 8.3–10.6)
CHLORIDE SERPL-SCNC: 104 MMOL/L (ref 99–110)
CO2 SERPL-SCNC: 27 MMOL/L (ref 21–32)
CREAT SERPL-MCNC: 0.8 MG/DL (ref 0.8–1.3)
GFR SERPLBLD CREATININE-BSD FMLA CKD-EPI: >90 ML/MIN/{1.73_M2}
GLUCOSE SERPL-MCNC: 107 MG/DL (ref 70–99)
MAGNESIUM SERPL-MCNC: 2.04 MG/DL (ref 1.8–2.4)
POTASSIUM SERPL-SCNC: 3.6 MMOL/L (ref 3.5–5.1)
SODIUM SERPL-SCNC: 143 MMOL/L (ref 136–145)

## 2024-08-20 PROCEDURE — 80048 BASIC METABOLIC PNL TOTAL CA: CPT

## 2024-08-20 PROCEDURE — 93000 ELECTROCARDIOGRAM COMPLETE: CPT | Performed by: INTERNAL MEDICINE

## 2024-08-20 PROCEDURE — 83735 ASSAY OF MAGNESIUM: CPT

## 2024-08-20 PROCEDURE — 36415 COLL VENOUS BLD VENIPUNCTURE: CPT

## 2024-08-20 NOTE — TELEPHONE ENCOUNTER
Per NEVA review of EKG in office please schedule apt for patient with EP MD for evaluation of high PVC burden.

## 2024-08-20 NOTE — TELEPHONE ENCOUNTER
EPRN's next available without overbook is now 11/26/24. Are we able to work pt in sooner for an appt, or proceed with next available?

## 2024-08-20 NOTE — TELEPHONE ENCOUNTER
8/20- called pt @[phone] pt is now scheduled 9/17 315pm with RUPESH as a new pt. Time/date per RNEW.

## 2024-08-20 NOTE — TELEPHONE ENCOUNTER
YOLY BANERJEE    Pt EKG preformed in office.    /70, HR 65    Pt still complaining of fatigue, diarrhea, and not feeling any better since LHC/ stent on 8/16.     He took 12.5 mg of carvedilol yesterday and states he feels much better today after only taking 6.25 mg of carvedilol.     NEVA reviewed EKG in office, ordered BMP, MG, and EP referral placed for evaluation of high PVC burden.

## 2024-08-28 NOTE — TELEPHONE ENCOUNTER
If symptoms improved after taking Coreg, okay to decrease dose to 6.25 mg twice daily for now.    Agree with referral to EP for further evaluation of high PVC burden.

## 2024-08-29 RX ORDER — CARVEDILOL 6.25 MG/1
6.25 TABLET ORAL 2 TIMES DAILY WITH MEALS
Qty: 90 TABLET | Refills: 3
Start: 2024-08-29

## 2024-09-05 ENCOUNTER — OFFICE VISIT (OUTPATIENT)
Dept: CARDIOLOGY CLINIC | Age: 74
End: 2024-09-05
Payer: MEDICARE

## 2024-09-05 VITALS
OXYGEN SATURATION: 96 % | HEIGHT: 74 IN | DIASTOLIC BLOOD PRESSURE: 60 MMHG | HEART RATE: 74 BPM | BODY MASS INDEX: 34.52 KG/M2 | WEIGHT: 269 LBS | SYSTOLIC BLOOD PRESSURE: 110 MMHG

## 2024-09-05 DIAGNOSIS — I25.10 CORONARY ARTERY DISEASE INVOLVING NATIVE CORONARY ARTERY OF NATIVE HEART WITHOUT ANGINA PECTORIS: ICD-10-CM

## 2024-09-05 DIAGNOSIS — I49.3 PVC'S (PREMATURE VENTRICULAR CONTRACTIONS): Primary | ICD-10-CM

## 2024-09-05 PROCEDURE — 3017F COLORECTAL CA SCREEN DOC REV: CPT | Performed by: NURSE PRACTITIONER

## 2024-09-05 PROCEDURE — 3074F SYST BP LT 130 MM HG: CPT | Performed by: NURSE PRACTITIONER

## 2024-09-05 PROCEDURE — 3078F DIAST BP <80 MM HG: CPT | Performed by: NURSE PRACTITIONER

## 2024-09-05 PROCEDURE — G8417 CALC BMI ABV UP PARAM F/U: HCPCS | Performed by: NURSE PRACTITIONER

## 2024-09-05 PROCEDURE — 1036F TOBACCO NON-USER: CPT | Performed by: NURSE PRACTITIONER

## 2024-09-05 PROCEDURE — 99214 OFFICE O/P EST MOD 30 MIN: CPT | Performed by: NURSE PRACTITIONER

## 2024-09-05 PROCEDURE — 1123F ACP DISCUSS/DSCN MKR DOCD: CPT | Performed by: NURSE PRACTITIONER

## 2024-09-05 PROCEDURE — G8427 DOCREV CUR MEDS BY ELIG CLIN: HCPCS | Performed by: NURSE PRACTITIONER

## 2024-09-05 ASSESSMENT — ENCOUNTER SYMPTOMS
GASTROINTESTINAL NEGATIVE: 1
RESPIRATORY NEGATIVE: 1

## 2024-09-05 NOTE — PATIENT INSTRUCTIONS
Consider cardiac rehab  Sleep apnea evaluation  Continue current medications  Magnesium oxide 400 mg daily  Follow up as planned Dr. Becker  Follow up with Dr. Garza in December

## 2024-09-05 NOTE — PROGRESS NOTES
[facility]   Cardiology Note              Date:  September 5, 2024  Patientname: Mj Delgadillo  YOB: 1950    Primary Care physician: Chau Prieto DO    HISTORY OF PRESENT ILLNESS: Mj Delgadillo is a 74 y.o. male with a history of CAD, PVCs, atrial flutter and tachycardia, HTN, HLD, polymyalgia rheumatica.  He had atrial flutter ablation in 2018.  In 1/2021 he had inferior STEMI and FADIA x 2 RCA.  He was in the ED 7/2024 with palpitations and fatigue, EKG showed sinus rhythm with frequent PVCs.  Cardiac monitor showed sinus rhythm with 18.4% PVC burden.  Echo showed EF 55-60%.  Stress test abnormal.  On 8/16/2024 LHC showed positive FFR LAD treated with FADIA.     Today he presents for hospital follow-up for CAD s/p PCI.  Main issue is ongoing fatigue since late June, worse in the morning and improves by later afternoon.  Has not been tested for sleep apnea, unsure if he snores.  He has palpitations, some mild improvement since PCI but not resolved.  He denies any chest pain, syncope, dizziness.  He has slight shortness of breath that is not bothersome.  He is active with farming.  He has decreased caffeine and alcohol intake.    Office weight today 9/5/2024: 269 lbs  Office with 8/12/2024: 272 lbs    Cardiologist: Dr. Garza    Past Medical History:   has a past medical history of Carotid stenosis, non-symptomatic, Coronary artery disease involving native coronary artery of native heart without angina pectoris, Hyperlipidemia, Hypertension, and Obesity.    Past Surgical History:   has a past surgical history that includes Appendectomy; Dental surgery; Cardiac surgery (05/2018); Cardiac procedure (N/A, 8/16/2024); Cardiac procedure (N/A, 8/16/2024); Cardiac procedure (N/A, 8/16/2024); and Cardiac procedure (N/A, 8/16/2024).     Home Medications:    Prior to Admission medications    Medication Sig Start Date End Date Taking? Authorizing Provider   carvedilol (COREG) 6.25 MG tablet

## 2024-09-17 ENCOUNTER — OFFICE VISIT (OUTPATIENT)
Dept: CARDIOLOGY CLINIC | Age: 74
End: 2024-09-17

## 2024-09-17 VITALS
HEART RATE: 68 BPM | HEIGHT: 74 IN | BODY MASS INDEX: 34.34 KG/M2 | SYSTOLIC BLOOD PRESSURE: 110 MMHG | WEIGHT: 267.6 LBS | OXYGEN SATURATION: 97 % | DIASTOLIC BLOOD PRESSURE: 64 MMHG

## 2024-09-17 DIAGNOSIS — I49.3 PREMATURE VENTRICULAR CONTRACTION: Primary | ICD-10-CM

## 2024-09-17 DIAGNOSIS — I47.19 PAROXYSMAL ATRIAL TACHYCARDIA (HCC): ICD-10-CM

## 2024-09-17 DIAGNOSIS — I25.10 CORONARY ARTERY DISEASE INVOLVING NATIVE CORONARY ARTERY OF NATIVE HEART, UNSPECIFIED WHETHER ANGINA PRESENT: ICD-10-CM

## 2024-09-17 DIAGNOSIS — I49.3 PVC (PREMATURE VENTRICULAR CONTRACTION): ICD-10-CM

## 2024-09-17 RX ORDER — LANOLIN ALCOHOL/MO/W.PET/CERES
400 CREAM (GRAM) TOPICAL DAILY
COMMUNITY

## 2024-09-17 RX ORDER — CARVEDILOL 3.12 MG/1
3.12 TABLET ORAL 2 TIMES DAILY WITH MEALS
Qty: 60 TABLET | Refills: 3 | Status: SHIPPED | OUTPATIENT
Start: 2024-09-17

## 2024-09-17 NOTE — PATIENT INSTRUCTIONS
RECOMMENDATIONS:  Decrease carvedilol to 3.125 mg twice daily.   Let us know how this works for you.   Sleep apnea evaluation as previously recommended by ERIC Mendoza.   Discussed high burden of premature ventricular contractions (PVC's).  Discussed antiarrhythmic medications. Amiodarone (potential harmful side effects), tikosyn (4 day hospital stay), sotalol (4 day hospital stay), or dronedarone (potential for nausea,vomiting, diarrhea).   Discussed risks and benefits of catheter ablation.   Follow up in 3 months with me.

## 2024-09-17 NOTE — PROGRESS NOTES
[facility]   Electrophysiology Consult Note              Date: 9/17/24  Patient Name: Mj Delgadillo  YOB: 1950    Primary Care Physician: Chau Prieto DO    CHIEF COMPLAINT:   Chief Complaint   Patient presents with    New Patient    Tachycardia    Other     PVC     Shortness of Breath     Sometimes      HISTORY OF PRESENT ILLNESS: Mj Delgadillo is a 74 y.o. male with a PMH significant for CAD, PVC's, AFL s/p ablation in 2018. Patient was in the ER in 7/2024 with complaints of palpitations and EKG showed frequent PVC's. Cardiac monitor after ER visit showed 18% PVC burden. Echo showed EF 55-60%. Stress testing was abnormal and patient underwent LHC on 8/16/2024 that showed positive FFR and was treated with stent.    Today, 9/17/2024, EKG demonstrates SR 68 bpm. He reports that he is fatigued. He can feel palpitations. He is taking his medications as prescribed. Patient denies current edema, chest pain, shortness of breath, dizziness or syncope.     Past Medical History:   has a past medical history of Carotid stenosis, non-symptomatic, Coronary artery disease involving native coronary artery of native heart without angina pectoris, Hyperlipidemia, Hypertension, and Obesity.    Past Surgical History:   has a past surgical history that includes Appendectomy; Dental surgery; Cardiac surgery (05/2018); Cardiac procedure (N/A, 8/16/2024); Cardiac procedure (N/A, 8/16/2024); Cardiac procedure (N/A, 8/16/2024); and Cardiac procedure (N/A, 8/16/2024).     Allergies:  Patient has no known allergies.    Social History:   reports that he has never smoked. He has never used smokeless tobacco. He reports current alcohol use. He reports that he does not use drugs.     Family History: family history includes Heart Disease in his brother; Heart Surgery in his father; Pacemaker in his father.    Home Medications:    Prior to Admission medications    Medication Sig Start Date End Date Taking?

## 2024-10-17 ENCOUNTER — TELEMEDICINE (OUTPATIENT)
Dept: FAMILY MEDICINE CLINIC | Age: 74
End: 2024-10-17

## 2024-10-17 DIAGNOSIS — Z00.00 MEDICARE ANNUAL WELLNESS VISIT, SUBSEQUENT: Primary | ICD-10-CM

## 2024-10-17 SDOH — ECONOMIC STABILITY: FOOD INSECURITY: WITHIN THE PAST 12 MONTHS, YOU WORRIED THAT YOUR FOOD WOULD RUN OUT BEFORE YOU GOT MONEY TO BUY MORE.: NEVER TRUE

## 2024-10-17 SDOH — ECONOMIC STABILITY: INCOME INSECURITY: HOW HARD IS IT FOR YOU TO PAY FOR THE VERY BASICS LIKE FOOD, HOUSING, MEDICAL CARE, AND HEATING?: NOT HARD AT ALL

## 2024-10-17 SDOH — ECONOMIC STABILITY: FOOD INSECURITY: WITHIN THE PAST 12 MONTHS, THE FOOD YOU BOUGHT JUST DIDN'T LAST AND YOU DIDN'T HAVE MONEY TO GET MORE.: NEVER TRUE

## 2024-10-17 ASSESSMENT — PATIENT HEALTH QUESTIONNAIRE - PHQ9
SUM OF ALL RESPONSES TO PHQ QUESTIONS 1-9: 0
2. FEELING DOWN, DEPRESSED OR HOPELESS: NOT AT ALL
SUM OF ALL RESPONSES TO PHQ QUESTIONS 1-9: 0
SUM OF ALL RESPONSES TO PHQ QUESTIONS 1-9: 0
SUM OF ALL RESPONSES TO PHQ9 QUESTIONS 1 & 2: 0
1. LITTLE INTEREST OR PLEASURE IN DOING THINGS: NOT AT ALL
SUM OF ALL RESPONSES TO PHQ QUESTIONS 1-9: 0

## 2024-10-17 NOTE — PROGRESS NOTES
1 tablet by mouth daily  Nuzhat Farr APRN - CNP   predniSONE (DELTASONE) 1 MG tablet Take 2 tablets by mouth daily  Nuzhat Farr APRN - CNP   lisinopril-hydroCHLOROthiazide (PRINZIDE;ZESTORETIC) 20-25 MG per tablet TAKE ONE TABLET BY MOUTH DAILY  Patient taking differently: Take 1 tablet by mouth daily TAKE ONE TABLET BY MOUTH DAILY  Noel Garza DO   aspirin 81 MG chewable tablet Take 1 tablet by mouth daily  Kristina Cho MD       [facility] (Including outside providers/suppliers regularly involved in providing care):   Patient Care Team:  Chau Prieto DO as PCP - General (Family Medicine)  Chau Prieto DO as PCP - Empaneled Provider      Reviewed and updated this visit:  Tobacco  Allergies  Meds  Problems  Med Hx  Surg Hx  Soc Hx  Fam Hx        I, Neela Calzada LPN, 10/17/2024, performed the documented evaluation under the direct supervision of the attending physician.  Mj Delgadillo, was evaluated through a synchronous (real-time) audio-video encounter. The patient (or guardian if applicable) is aware that this is a billable service, which includes applicable co-pays. This Virtual Visit was conducted with patient's (and/or legal guardian's) consent. Patient identification was verified, and a caregiver was present when appropriate.   The patient was located at Home: [address] [place] [address]  Provider was located at Facility (Appt Dept): [address] [place] [address]  [address]  [place] [address]  Confirm you are appropriately licensed, registered, or certified to deliver care in the state where the patient is located as indicated above. If you are not or unsure, please re-schedule the visit: Yes, I confirm.      This encounter was performed under my, Rehana Leal MD’s, direct supervision, 10/17/2024.

## 2024-12-18 ENCOUNTER — OFFICE VISIT (OUTPATIENT)
Dept: CARDIOLOGY CLINIC | Age: 74
End: 2024-12-18

## 2024-12-18 VITALS
OXYGEN SATURATION: 97 % | HEIGHT: 74 IN | SYSTOLIC BLOOD PRESSURE: 132 MMHG | WEIGHT: 281.5 LBS | DIASTOLIC BLOOD PRESSURE: 74 MMHG | HEART RATE: 73 BPM | BODY MASS INDEX: 36.13 KG/M2

## 2024-12-18 DIAGNOSIS — I47.19 PAROXYSMAL ATRIAL TACHYCARDIA (HCC): ICD-10-CM

## 2024-12-18 DIAGNOSIS — R00.2 PALPITATIONS: ICD-10-CM

## 2024-12-18 DIAGNOSIS — I49.3 PVC (PREMATURE VENTRICULAR CONTRACTION): ICD-10-CM

## 2024-12-18 DIAGNOSIS — I49.3 PVC'S (PREMATURE VENTRICULAR CONTRACTIONS): ICD-10-CM

## 2024-12-18 DIAGNOSIS — R53.83 OTHER FATIGUE: ICD-10-CM

## 2024-12-18 DIAGNOSIS — I48.92 ATRIAL FLUTTER, UNSPECIFIED TYPE (HCC): ICD-10-CM

## 2024-12-18 DIAGNOSIS — E78.5 HYPERLIPIDEMIA, UNSPECIFIED HYPERLIPIDEMIA TYPE: ICD-10-CM

## 2024-12-18 DIAGNOSIS — I10 ESSENTIAL HYPERTENSION: ICD-10-CM

## 2024-12-18 DIAGNOSIS — I25.10 CORONARY ARTERY DISEASE INVOLVING NATIVE CORONARY ARTERY OF NATIVE HEART WITHOUT ANGINA PECTORIS: Primary | ICD-10-CM

## 2024-12-18 RX ORDER — LISINOPRIL AND HYDROCHLOROTHIAZIDE 20; 25 MG/1; MG/1
TABLET ORAL
Qty: 90 TABLET | Refills: 3 | Status: SHIPPED | OUTPATIENT
Start: 2024-12-18

## 2024-12-18 RX ORDER — CARVEDILOL 3.12 MG/1
3.12 TABLET ORAL 2 TIMES DAILY WITH MEALS
Qty: 180 TABLET | Refills: 3 | Status: SHIPPED | OUTPATIENT
Start: 2024-12-18

## 2024-12-18 NOTE — PROGRESS NOTES
[facility]   Cardiovascular Evaluation    PATIENT: Mj Delgadillo  DATE: 2024  MRN: [id number]  [facility]: [id number]  : 1950      Primary Care Doctor: Chau Prieto DO  Reason for evaluation:   Follow-up for CAD    Subjective:     Mj Delgadillo is a 74 y.o. male with a history of CAD, atrial flutter s/p RFCA on 18 (no longer on anticoagulation), paroxysmal atrial tachycardia, PVCs, essential hypertension, hyperlipidemia, polymyalgia rheumatica, and obesity who presents for follow-up.    The patient was admitted to [facility] from -21 with an acute inferior STEMI for which he underwent PCI to the mid-distal RCA with overlapping Xience Zoie 4.0 x 38 mm and 4.0 x 33 mm FADIA, tapered to final diameter of 4.8 mm.  Angiography at that time was also notable for a 40% eccentric proximal LAD stenosis and 30% proximal LCx stenosis.  TTE obtained during his admission showed an LVEF of 50% with no wall motion abnormalities, grade 1 diastolic dysfunction, normal RV size and systolic function, and mild mitral and tricuspid regurgitation.    The patient was seen in the emergency room on 24 with palpitations that he described as flutters and fatigue.  His EKG showed sinus rhythm with frequent PVCs and high-sensitivity troponin was within normal limits x2.  TSH and pro-BMP were within normal limits as well.  His coreg was increased to 6.25 mg BID.  A pharmacologic nuclear SPECT stress test was obtained on 24 and showed small areas of anteroseptal and apical lateral ischemia.  A TTE performed at that time showed an LVEF of 55-60% with normal wall motion, mild LVH, grade 1 diastolic dysfunction, normal RV size and systolic function, trace aortic regurgitation, mild mitral regurgitation, trace pulmonic regurgitation, and trace tricuspid regurgitation.  He wore a Vital Connect monitor from -2024 that showed predominant sinus rhythm with rare PACs, 18.4% PVC 
anticoagulation.  Follows with EP.  4. Palpitations - Suspect patient is sensing PVCs.  Has noticed some improvement in symptoms following recent increase in beta-blocker.  5. High PVC burden - Recent Vital Connect monitor showed 18.4% burden.  Planning for invasive coronary angiography for further ischemic evaluation as above.    6. Polymyalgia rheumatica - Following with rheumatology.   7. Essential hypertension - /80 today in clinic.  8. Hyperlipidemia  9. Paroxysmal atrial tachycardia  10. Obesity    Plan:     1. Recommend proceeding with invasive coronary angiography for further assessment of the patient's coronary anatomy.  The risk/benefits of the aforementioned procedure with possible percutaneous coronary intervention were thoroughly reviewed with the patient and he agrees to proceed.  2. Will increase coreg to 12.5 mg BID.  Continue aspirin 81 mg daily, rosuvastatin 30 mg daily, and lisinopril-HCTZ 20-25 mg daily.  3. Check lipid panel.  4. If not found to have significant ischemic source for PVCs and burden remains high despite increase in beta-blocker, can discuss possible candidacy for PVC ablation with EP.  5. Follow-up with me after procedure.            Noel Garza DO  [facility]  [phone] [place] Office  [phone] [place] Office

## 2024-12-18 NOTE — PATIENT INSTRUCTIONS
Plan:     1. Recommend proceeding with evaluation with Electrophysiology for PVC evaluation  2. Great job on not smoking. Continue to avoid as this is a risk factor to heart attack, stroke, or cancer.   3. Continue taking crestor 30 mg daily, lisinopril-HCTZ 20-25 mg tablet daily, Plavix 75 mg daily, carvedilol 3.125 mg BID, and aspirin 81 mg daily.   4. Continue to encourage heart healthy, low sodium diet and regular physical exercise for at least 30 minutes a minimum of 3-5 times per week.   5. Follow up in 6 month

## 2025-04-17 ENCOUNTER — OFFICE VISIT (OUTPATIENT)
Dept: CARDIOLOGY CLINIC | Age: 75
End: 2025-04-17
Payer: MEDICARE

## 2025-04-17 VITALS
DIASTOLIC BLOOD PRESSURE: 82 MMHG | HEART RATE: 65 BPM | HEIGHT: 74 IN | SYSTOLIC BLOOD PRESSURE: 138 MMHG | OXYGEN SATURATION: 97 % | BODY MASS INDEX: 35.94 KG/M2 | WEIGHT: 280 LBS

## 2025-04-17 DIAGNOSIS — I49.3 PVC (PREMATURE VENTRICULAR CONTRACTION): Primary | ICD-10-CM

## 2025-04-17 LAB
EKG ATRIAL RATE: 65 BPM
EKG DIAGNOSIS: NORMAL
EKG P AXIS: 88 DEGREES
EKG P-R INTERVAL: 162 MS
EKG Q-T INTERVAL: 398 MS
EKG QRS DURATION: 88 MS
EKG QTC CALCULATION (BAZETT): 413 MS
EKG R AXIS: 13 DEGREES
EKG T AXIS: 57 DEGREES
EKG VENTRICULAR RATE: 65 BPM

## 2025-04-17 PROCEDURE — 1160F RVW MEDS BY RX/DR IN RCRD: CPT | Performed by: INTERNAL MEDICINE

## 2025-04-17 PROCEDURE — 3079F DIAST BP 80-89 MM HG: CPT | Performed by: INTERNAL MEDICINE

## 2025-04-17 PROCEDURE — 99214 OFFICE O/P EST MOD 30 MIN: CPT | Performed by: INTERNAL MEDICINE

## 2025-04-17 PROCEDURE — 1159F MED LIST DOCD IN RCRD: CPT | Performed by: INTERNAL MEDICINE

## 2025-04-17 PROCEDURE — G8417 CALC BMI ABV UP PARAM F/U: HCPCS | Performed by: INTERNAL MEDICINE

## 2025-04-17 PROCEDURE — 1123F ACP DISCUSS/DSCN MKR DOCD: CPT | Performed by: INTERNAL MEDICINE

## 2025-04-17 PROCEDURE — 3017F COLORECTAL CA SCREEN DOC REV: CPT | Performed by: INTERNAL MEDICINE

## 2025-04-17 PROCEDURE — 1036F TOBACCO NON-USER: CPT | Performed by: INTERNAL MEDICINE

## 2025-04-17 PROCEDURE — G8427 DOCREV CUR MEDS BY ELIG CLIN: HCPCS | Performed by: INTERNAL MEDICINE

## 2025-04-17 PROCEDURE — G2211 COMPLEX E/M VISIT ADD ON: HCPCS | Performed by: INTERNAL MEDICINE

## 2025-04-17 PROCEDURE — 3075F SYST BP GE 130 - 139MM HG: CPT | Performed by: INTERNAL MEDICINE

## 2025-04-17 RX ORDER — ROSUVASTATIN CALCIUM 20 MG/1
TABLET, COATED ORAL
Qty: 45 TABLET | Refills: 3 | Status: SHIPPED | OUTPATIENT
Start: 2025-04-17 | End: 2025-04-18 | Stop reason: SDUPTHER

## 2025-04-17 NOTE — PROGRESS NOTES
[facility]   Electrophysiology Note              Date: 4/17/25  Patient Name: Mj Delgadillo  YOB: 1950    Primary Care Physician: No primary care provider on file.    CHIEF COMPLAINT:   Chief Complaint   Patient presents with    3 Month Follow-Up    Other     PVCs     Tachycardia     HISTORY OF PRESENT ILLNESS: Mj Delgadillo is a 74 y.o. male with a PMH significant for CAD, PVC's, AFL s/p ablation in 2018. Patient was in the ER in 7/2024 with complaints of palpitations and EKG showed frequent PVC's. Cardiac monitor after ER visit showed 18% PVC burden. Echo showed EF 55-60%. Stress testing was abnormal and patient underwent LHC on 8/16/2024 that showed positive FFR and was treated with stent.    On 9/17/2024, patient reported fatigued. Coreg was subsequently decreased to 3.125mg BID.      Interval History since last office visit: Today, 4/17/2025, ECG demonstrates SR 65 BPM. He reports he has been feeling well; he reports his fatigue has resolved since coreg was decreased at 9/2024 office visit. He has an appointment in June 2025 with Dr. Medel for follow up CAD, HTN, and HLD. Patient is taking all cardiac medications as prescribed and tolerates them well. Patient denies current edema, chest pain, shortness of breath, palpitations, dizziness or syncope.     Past Medical History:   has a past medical history of Carotid stenosis, non-symptomatic, Coronary artery disease involving native coronary artery of native heart without angina pectoris, Hyperlipidemia, Hypertension, and Obesity.    Past Surgical History:   has a past surgical history that includes Appendectomy; Dental surgery; Cardiac surgery (05/2018); Cardiac procedure (N/A, 8/16/2024); Cardiac procedure (N/A, 8/16/2024); Cardiac procedure (N/A, 8/16/2024); and Cardiac procedure (N/A, 8/16/2024).     Allergies:  Patient has no known allergies.    Social History:   reports that he has never smoked. He has never used

## 2025-04-17 NOTE — PATIENT INSTRUCTIONS
RECOMMENDATIONS:  Continue cardiac medications as prescribed.   -Crestor refilled today- Dr. Medel to take over once patient establishes care.   Follow up with EP NP in 1 year.

## 2025-04-18 RX ORDER — ROSUVASTATIN CALCIUM 20 MG/1
30 TABLET, COATED ORAL DAILY
Qty: 135 TABLET | Refills: 3 | Status: SHIPPED | OUTPATIENT
Start: 2025-04-18

## 2025-04-18 NOTE — TELEPHONE ENCOUNTER
I have filled however her statin should be filled by either cardiology or primary care doctor moving forward.

## 2025-06-20 ENCOUNTER — OFFICE VISIT (OUTPATIENT)
Dept: CARDIOLOGY CLINIC | Age: 75
End: 2025-06-20
Payer: MEDICARE

## 2025-06-20 VITALS
WEIGHT: 280 LBS | OXYGEN SATURATION: 98 % | HEART RATE: 70 BPM | DIASTOLIC BLOOD PRESSURE: 66 MMHG | HEIGHT: 74 IN | SYSTOLIC BLOOD PRESSURE: 124 MMHG | BODY MASS INDEX: 35.94 KG/M2

## 2025-06-20 DIAGNOSIS — E66.01 CLASS 2 SEVERE OBESITY DUE TO EXCESS CALORIES WITH SERIOUS COMORBIDITY AND BODY MASS INDEX (BMI) OF 36.0 TO 36.9 IN ADULT (HCC): ICD-10-CM

## 2025-06-20 DIAGNOSIS — Z79.899 MEDICATION MANAGEMENT: ICD-10-CM

## 2025-06-20 DIAGNOSIS — R53.83 FATIGUE, UNSPECIFIED TYPE: ICD-10-CM

## 2025-06-20 DIAGNOSIS — Z86.79 S/P ABLATION OF ATRIAL FLUTTER: ICD-10-CM

## 2025-06-20 DIAGNOSIS — I25.10 CORONARY ARTERY DISEASE INVOLVING NATIVE HEART WITHOUT ANGINA PECTORIS, UNSPECIFIED VESSEL OR LESION TYPE: ICD-10-CM

## 2025-06-20 DIAGNOSIS — I47.19 PAROXYSMAL ATRIAL TACHYCARDIA: ICD-10-CM

## 2025-06-20 DIAGNOSIS — Z98.890 S/P ABLATION OF ATRIAL FLUTTER: ICD-10-CM

## 2025-06-20 DIAGNOSIS — Z95.5 STENTED CORONARY ARTERY: Primary | ICD-10-CM

## 2025-06-20 DIAGNOSIS — I10 ESSENTIAL HYPERTENSION: ICD-10-CM

## 2025-06-20 DIAGNOSIS — E66.812 CLASS 2 SEVERE OBESITY DUE TO EXCESS CALORIES WITH SERIOUS COMORBIDITY AND BODY MASS INDEX (BMI) OF 36.0 TO 36.9 IN ADULT (HCC): ICD-10-CM

## 2025-06-20 PROCEDURE — G8417 CALC BMI ABV UP PARAM F/U: HCPCS | Performed by: INTERNAL MEDICINE

## 2025-06-20 PROCEDURE — 3074F SYST BP LT 130 MM HG: CPT | Performed by: INTERNAL MEDICINE

## 2025-06-20 PROCEDURE — 99214 OFFICE O/P EST MOD 30 MIN: CPT | Performed by: INTERNAL MEDICINE

## 2025-06-20 PROCEDURE — 1159F MED LIST DOCD IN RCRD: CPT | Performed by: INTERNAL MEDICINE

## 2025-06-20 PROCEDURE — 1036F TOBACCO NON-USER: CPT | Performed by: INTERNAL MEDICINE

## 2025-06-20 PROCEDURE — 1123F ACP DISCUSS/DSCN MKR DOCD: CPT | Performed by: INTERNAL MEDICINE

## 2025-06-20 PROCEDURE — 3017F COLORECTAL CA SCREEN DOC REV: CPT | Performed by: INTERNAL MEDICINE

## 2025-06-20 PROCEDURE — 3078F DIAST BP <80 MM HG: CPT | Performed by: INTERNAL MEDICINE

## 2025-06-20 PROCEDURE — G8427 DOCREV CUR MEDS BY ELIG CLIN: HCPCS | Performed by: INTERNAL MEDICINE

## 2025-06-20 PROCEDURE — G2211 COMPLEX E/M VISIT ADD ON: HCPCS | Performed by: INTERNAL MEDICINE

## 2025-06-20 RX ORDER — CLOPIDOGREL BISULFATE 75 MG/1
75 TABLET ORAL DAILY
Qty: 90 TABLET | Refills: 3 | Status: SHIPPED | OUTPATIENT
Start: 2025-06-20

## 2025-06-20 RX ORDER — CARVEDILOL 3.12 MG/1
3.12 TABLET ORAL 2 TIMES DAILY WITH MEALS
Qty: 180 TABLET | Refills: 3 | Status: SHIPPED | OUTPATIENT
Start: 2025-06-20

## 2025-06-20 NOTE — PATIENT INSTRUCTIONS
Plan:  Continue prescribed medications as ordered.  Order fasting lab work: Lipids, TSH, A1C, CMP and CBC. You will need to be fasting for 8 hours prior.  Get this completed prior to next visit.   Discussed plan to remain on Plavix for at least 3 years.   Follow up with me in March.    Opt out

## 2025-06-20 NOTE — PROGRESS NOTES
CARDIOLOGY OFFICE VISIT        Patient Name: Mj Delgadillo  Primary Care physician: No primary care provider on file.    Reason for Referral/Chief Complaint: Mj Delgadillo is a 75 y.o. patient who is presenting to cardiology clinic today for office visit.     History of Present Illness:   Mj Delgadillo is a 75 y.o. with a past medical history of CAD, atrial flutter s/p RFCA on 5/28/18 (no longer on anticoagulation), paroxysmal atrial tachycardia, PVCs, essential hypertension, hyperlipidemia, polymyalgia rheumatica, and obesity. Patient was admitted 1/21-1/23/21 with an acute inferior STEMI for which he underwent PCI to the mid-distal RCA with overlapping Xience Zoie 4.0 x 38 mm and 4.0 x 33 mm FADIA, tapered to final diameter of 4.8 mm. Angiography at that time was also notable for a 40% eccentric proximal LAD stenosis and 30% proximal LCx stenosis. Patient presented to ED 7/2024  with palpitations that he described as flutters and fatigue.  His EKG showed sinus rhythm with frequent PVCs and high-sensitivity troponin was within normal limits x2. stress test was obtained on 8/6/24 and showed small areas of anteroseptal and apical lateral ischemia. He wore a [employer] monitor from 7/19-8/2/2024 that showed predominant sinus rhythm with rare PACs, 18.4% PVC burden, brief PSVT, and very brief NSVT. He underwent LHC 8/2024 which showed severe single-vessel coronary artery disease with a calcified 60-70% proximal/mid-LAD stenosis and sequential 40% mid-LAD stenoses.  The stenoses were found to be physiologically significant when evaluated cumulatively by FFR of 0.75.  On wire pull-back, the majority of obstruction to flow appear to be attributable to the more proximal LAD stenosis.  At that time, IVUS-guided Cutting Balloon angioplasty and PCI were performed to the proximal/mid-LAD with a Xience Zoie 4.0 x 28 mm FADIA, post-dilated with non-compliant 4.0 mm balloon at high pressure.  His mid-distal

## (undated) DEVICE — RADIFOCUS OPTITORQUE ANGIOGRAPHIC CATHETER: Brand: OPTITORQUE

## (undated) DEVICE — SOLUTION IV HEPARIN SODIUM SODIUM CHL 0.9% 500 ML INJ VIAFLX

## (undated) DEVICE — PRESSURE GUIDEWIRE: Brand: COMET™ II

## (undated) DEVICE — CATH BLLN ANGIO 4X20MM NC EUPHORIA RX

## (undated) DEVICE — MICROSURGICAL DILATATION DEVICE: Brand: WOLVERINE CORONARY CUTTING BALLOON

## (undated) DEVICE — TR BAND RADIAL ARTERY COMPRESSION DEVICE: Brand: TR BAND

## (undated) DEVICE — CATH LAB PACK: Brand: MEDLINE INDUSTRIES, INC.

## (undated) DEVICE — COPILOT BLEEDBACK CONTROL VALVE: Brand: COPILOT

## (undated) DEVICE — 6FR XB3 CORDIS GUIDE 100CM

## (undated) DEVICE — CATH BLLN ANGIO 3.50X15MM NC EUPHORA RX

## (undated) DEVICE — CORONARY IMAGING CATHETER: Brand: OPTICROSS™ 6 HD

## (undated) DEVICE — GLIDESHEATH SLENDER STAINLESS STEEL KIT: Brand: GLIDESHEATH SLENDER

## (undated) DEVICE — 260 CM J TIP WIRE .035

## (undated) DEVICE — DISPOSABLE PULLBACK SLED FOR MOTORDRIVE

## (undated) DEVICE — Device: Brand: PROWATER

## (undated) DEVICE — BASIXCOMPAK INDEFLATOR